# Patient Record
Sex: MALE | Race: WHITE
[De-identification: names, ages, dates, MRNs, and addresses within clinical notes are randomized per-mention and may not be internally consistent; named-entity substitution may affect disease eponyms.]

---

## 2017-01-03 ENCOUNTER — APPOINTMENT (OUTPATIENT)
Dept: CARDIOLOGY | Facility: CLINIC | Age: 82
End: 2017-01-03

## 2017-01-03 ENCOUNTER — NON-APPOINTMENT (OUTPATIENT)
Age: 82
End: 2017-01-03

## 2017-01-03 VITALS
BODY MASS INDEX: 28.93 KG/M2 | SYSTOLIC BLOOD PRESSURE: 175 MMHG | WEIGHT: 180 LBS | HEART RATE: 76 BPM | DIASTOLIC BLOOD PRESSURE: 98 MMHG | HEIGHT: 66 IN

## 2017-01-03 VITALS — DIASTOLIC BLOOD PRESSURE: 96 MMHG | SYSTOLIC BLOOD PRESSURE: 150 MMHG

## 2017-01-04 LAB
24R-OH-CALCIDIOL SERPL-MCNC: 43 PG/ML
ALBUMIN SERPL ELPH-MCNC: 4.5 G/DL
ALP BLD-CCNC: 90 U/L
ALT SERPL-CCNC: 10 U/L
ANION GAP SERPL CALC-SCNC: 15 MMOL/L
AST SERPL-CCNC: 28 U/L
BASOPHILS # BLD AUTO: 0.04 K/UL
BASOPHILS NFR BLD AUTO: 0.4 %
BILIRUB SERPL-MCNC: 1.5 MG/DL
BUN SERPL-MCNC: 20 MG/DL
CALCIUM SERPL-MCNC: 9.9 MG/DL
CHLORIDE SERPL-SCNC: 97 MMOL/L
CHOLEST SERPL-MCNC: 171 MG/DL
CHOLEST/HDLC SERPL: 2.5 RATIO
CO2 SERPL-SCNC: 24 MMOL/L
CREAT SERPL-MCNC: 1.02 MG/DL
EOSINOPHIL # BLD AUTO: 0.2 K/UL
EOSINOPHIL NFR BLD AUTO: 2.1 %
GLUCOSE SERPL-MCNC: 101 MG/DL
HBA1C MFR BLD HPLC: 5.7 %
HCT VFR BLD CALC: 45.4 %
HDLC SERPL-MCNC: 68 MG/DL
HGB BLD-MCNC: 15 G/DL
IMM GRANULOCYTES NFR BLD AUTO: 0.2 %
LDLC SERPL CALC-MCNC: 84 MG/DL
LYMPHOCYTES # BLD AUTO: 1.19 K/UL
LYMPHOCYTES NFR BLD AUTO: 12.7 %
MAN DIFF?: NORMAL
MCHC RBC-ENTMCNC: 29.1 PG
MCHC RBC-ENTMCNC: 33 GM/DL
MCV RBC AUTO: 88 FL
MONOCYTES # BLD AUTO: 0.87 K/UL
MONOCYTES NFR BLD AUTO: 9.3 %
NEUTROPHILS # BLD AUTO: 7.05 K/UL
NEUTROPHILS NFR BLD AUTO: 75.3 %
PLATELET # BLD AUTO: 218 K/UL
POTASSIUM SERPL-SCNC: 5.1 MMOL/L
PROT SERPL-MCNC: 8.1 G/DL
RBC # BLD: 5.16 M/UL
RBC # FLD: 13.2 %
SODIUM SERPL-SCNC: 136 MMOL/L
TRIGL SERPL-MCNC: 93 MG/DL
TSH SERPL-ACNC: 3.09 UIU/ML
WBC # FLD AUTO: 9.37 K/UL

## 2017-01-10 ENCOUNTER — APPOINTMENT (OUTPATIENT)
Dept: CARDIOLOGY | Facility: CLINIC | Age: 82
End: 2017-01-10

## 2017-03-20 ENCOUNTER — RX RENEWAL (OUTPATIENT)
Age: 82
End: 2017-03-20

## 2017-04-04 ENCOUNTER — NON-APPOINTMENT (OUTPATIENT)
Age: 82
End: 2017-04-04

## 2017-04-04 ENCOUNTER — APPOINTMENT (OUTPATIENT)
Dept: CARDIOLOGY | Facility: CLINIC | Age: 82
End: 2017-04-04

## 2017-04-04 VITALS
OXYGEN SATURATION: 100 % | HEART RATE: 68 BPM | BODY MASS INDEX: 28.73 KG/M2 | SYSTOLIC BLOOD PRESSURE: 151 MMHG | DIASTOLIC BLOOD PRESSURE: 83 MMHG | WEIGHT: 178 LBS

## 2017-04-04 VITALS — SYSTOLIC BLOOD PRESSURE: 134 MMHG | DIASTOLIC BLOOD PRESSURE: 74 MMHG

## 2017-04-26 ENCOUNTER — MEDICATION RENEWAL (OUTPATIENT)
Age: 82
End: 2017-04-26

## 2017-07-11 ENCOUNTER — APPOINTMENT (OUTPATIENT)
Dept: CARDIOLOGY | Facility: CLINIC | Age: 82
End: 2017-07-11

## 2017-07-11 ENCOUNTER — NON-APPOINTMENT (OUTPATIENT)
Age: 82
End: 2017-07-11

## 2017-07-11 VITALS
WEIGHT: 179.23 LBS | BODY MASS INDEX: 28.93 KG/M2 | DIASTOLIC BLOOD PRESSURE: 86 MMHG | HEART RATE: 72 BPM | OXYGEN SATURATION: 100 % | SYSTOLIC BLOOD PRESSURE: 163 MMHG

## 2017-07-11 VITALS — SYSTOLIC BLOOD PRESSURE: 138 MMHG | DIASTOLIC BLOOD PRESSURE: 80 MMHG

## 2017-08-13 ENCOUNTER — FORM ENCOUNTER (OUTPATIENT)
Age: 82
End: 2017-08-13

## 2017-08-14 ENCOUNTER — APPOINTMENT (OUTPATIENT)
Dept: MRI IMAGING | Facility: IMAGING CENTER | Age: 82
End: 2017-08-14
Payer: MEDICARE

## 2017-08-14 ENCOUNTER — OUTPATIENT (OUTPATIENT)
Dept: OUTPATIENT SERVICES | Facility: HOSPITAL | Age: 82
LOS: 1 days | End: 2017-08-14
Payer: MEDICARE

## 2017-08-14 DIAGNOSIS — C64.9 MALIGNANT NEOPLASM OF UNSPECIFIED KIDNEY, EXCEPT RENAL PELVIS: ICD-10-CM

## 2017-08-14 PROCEDURE — 82565 ASSAY OF CREATININE: CPT

## 2017-08-14 PROCEDURE — 74183 MRI ABD W/O CNTR FLWD CNTR: CPT

## 2017-08-14 PROCEDURE — 74183 MRI ABD W/O CNTR FLWD CNTR: CPT | Mod: 26

## 2017-08-14 PROCEDURE — A9585: CPT

## 2017-08-17 ENCOUNTER — APPOINTMENT (OUTPATIENT)
Dept: UROLOGY | Facility: CLINIC | Age: 82
End: 2017-08-17
Payer: MEDICARE

## 2017-08-17 PROCEDURE — 99214 OFFICE O/P EST MOD 30 MIN: CPT

## 2017-08-31 ENCOUNTER — RX RENEWAL (OUTPATIENT)
Age: 82
End: 2017-08-31

## 2017-09-01 ENCOUNTER — RX RENEWAL (OUTPATIENT)
Age: 82
End: 2017-09-01

## 2017-09-30 ENCOUNTER — MEDICATION RENEWAL (OUTPATIENT)
Age: 82
End: 2017-09-30

## 2017-10-12 ENCOUNTER — APPOINTMENT (OUTPATIENT)
Dept: CARDIOLOGY | Facility: CLINIC | Age: 82
End: 2017-10-12
Payer: MEDICARE

## 2017-10-12 VITALS
HEIGHT: 66 IN | OXYGEN SATURATION: 98 % | HEART RATE: 69 BPM | WEIGHT: 180 LBS | BODY MASS INDEX: 28.93 KG/M2 | DIASTOLIC BLOOD PRESSURE: 95 MMHG | SYSTOLIC BLOOD PRESSURE: 176 MMHG

## 2017-10-12 VITALS — SYSTOLIC BLOOD PRESSURE: 132 MMHG | DIASTOLIC BLOOD PRESSURE: 80 MMHG

## 2017-10-12 PROCEDURE — 99215 OFFICE O/P EST HI 40 MIN: CPT

## 2017-12-04 ENCOUNTER — RX RENEWAL (OUTPATIENT)
Age: 82
End: 2017-12-04

## 2018-01-16 ENCOUNTER — NON-APPOINTMENT (OUTPATIENT)
Age: 83
End: 2018-01-16

## 2018-01-16 ENCOUNTER — APPOINTMENT (OUTPATIENT)
Dept: CARDIOLOGY | Facility: CLINIC | Age: 83
End: 2018-01-16
Payer: MEDICARE

## 2018-01-16 VITALS
HEIGHT: 66 IN | BODY MASS INDEX: 27.97 KG/M2 | DIASTOLIC BLOOD PRESSURE: 95 MMHG | SYSTOLIC BLOOD PRESSURE: 158 MMHG | HEART RATE: 73 BPM | OXYGEN SATURATION: 98 % | WEIGHT: 174 LBS

## 2018-01-16 VITALS — SYSTOLIC BLOOD PRESSURE: 142 MMHG | DIASTOLIC BLOOD PRESSURE: 82 MMHG

## 2018-01-16 DIAGNOSIS — E03.9 HYPOTHYROIDISM, UNSPECIFIED: ICD-10-CM

## 2018-01-16 LAB
ALBUMIN SERPL ELPH-MCNC: 4.3 G/DL
ALP BLD-CCNC: 93 U/L
ALT SERPL-CCNC: 12 U/L
ANION GAP SERPL CALC-SCNC: 15 MMOL/L
AST SERPL-CCNC: 21 U/L
BASOPHILS # BLD AUTO: 0.04 K/UL
BASOPHILS NFR BLD AUTO: 0.6 %
BILIRUB SERPL-MCNC: 1.7 MG/DL
BUN SERPL-MCNC: 25 MG/DL
CALCIUM SERPL-MCNC: 9.9 MG/DL
CHLORIDE SERPL-SCNC: 100 MMOL/L
CHOLEST SERPL-MCNC: 158 MG/DL
CHOLEST/HDLC SERPL: 2.4 RATIO
CO2 SERPL-SCNC: 24 MMOL/L
CREAT SERPL-MCNC: 1.31 MG/DL
EOSINOPHIL # BLD AUTO: 0.14 K/UL
EOSINOPHIL NFR BLD AUTO: 2.1 %
GLUCOSE SERPL-MCNC: 104 MG/DL
HCT VFR BLD CALC: 45 %
HDLC SERPL-MCNC: 67 MG/DL
HGB BLD-MCNC: 14.8 G/DL
IMM GRANULOCYTES NFR BLD AUTO: 0.1 %
LDLC SERPL CALC-MCNC: 72 MG/DL
LYMPHOCYTES # BLD AUTO: 1.3 K/UL
LYMPHOCYTES NFR BLD AUTO: 19.2 %
MAN DIFF?: NORMAL
MCHC RBC-ENTMCNC: 28.6 PG
MCHC RBC-ENTMCNC: 32.9 GM/DL
MCV RBC AUTO: 86.9 FL
MONOCYTES # BLD AUTO: 0.85 K/UL
MONOCYTES NFR BLD AUTO: 12.6 %
NEUTROPHILS # BLD AUTO: 4.43 K/UL
NEUTROPHILS NFR BLD AUTO: 65.4 %
PLATELET # BLD AUTO: 193 K/UL
POTASSIUM SERPL-SCNC: 4.9 MMOL/L
PROT SERPL-MCNC: 7.9 G/DL
RBC # BLD: 5.18 M/UL
RBC # FLD: 13.3 %
SODIUM SERPL-SCNC: 139 MMOL/L
TRIGL SERPL-MCNC: 95 MG/DL
TSH SERPL-ACNC: 1.51 UIU/ML
VIT B12 SERPL-MCNC: 441 PG/ML
WBC # FLD AUTO: 6.77 K/UL

## 2018-01-16 PROCEDURE — 93000 ELECTROCARDIOGRAM COMPLETE: CPT

## 2018-01-16 PROCEDURE — 99215 OFFICE O/P EST HI 40 MIN: CPT

## 2018-01-17 DIAGNOSIS — E55.9 VITAMIN D DEFICIENCY, UNSPECIFIED: ICD-10-CM

## 2018-01-17 LAB
25(OH)D3 SERPL-MCNC: 7.4 NG/ML
HBA1C MFR BLD HPLC: 5.6 %

## 2018-02-12 ENCOUNTER — FORM ENCOUNTER (OUTPATIENT)
Age: 83
End: 2018-02-12

## 2018-02-13 ENCOUNTER — APPOINTMENT (OUTPATIENT)
Dept: MRI IMAGING | Facility: IMAGING CENTER | Age: 83
End: 2018-02-13
Payer: MEDICARE

## 2018-02-13 ENCOUNTER — OUTPATIENT (OUTPATIENT)
Dept: OUTPATIENT SERVICES | Facility: HOSPITAL | Age: 83
LOS: 1 days | End: 2018-02-13
Payer: MEDICARE

## 2018-02-13 DIAGNOSIS — C64.9 MALIGNANT NEOPLASM OF UNSPECIFIED KIDNEY, EXCEPT RENAL PELVIS: ICD-10-CM

## 2018-02-13 PROCEDURE — A9585: CPT

## 2018-02-13 PROCEDURE — 74183 MRI ABD W/O CNTR FLWD CNTR: CPT | Mod: 26

## 2018-02-13 PROCEDURE — 74183 MRI ABD W/O CNTR FLWD CNTR: CPT

## 2018-02-22 ENCOUNTER — APPOINTMENT (OUTPATIENT)
Dept: UROLOGY | Facility: CLINIC | Age: 83
End: 2018-02-22
Payer: MEDICARE

## 2018-02-22 PROCEDURE — 99214 OFFICE O/P EST MOD 30 MIN: CPT

## 2018-04-17 ENCOUNTER — APPOINTMENT (OUTPATIENT)
Dept: CARDIOLOGY | Facility: CLINIC | Age: 83
End: 2018-04-17
Payer: MEDICARE

## 2018-04-17 ENCOUNTER — NON-APPOINTMENT (OUTPATIENT)
Age: 83
End: 2018-04-17

## 2018-04-17 VITALS — DIASTOLIC BLOOD PRESSURE: 90 MMHG | SYSTOLIC BLOOD PRESSURE: 150 MMHG

## 2018-04-17 VITALS
BODY MASS INDEX: 28.25 KG/M2 | SYSTOLIC BLOOD PRESSURE: 175 MMHG | DIASTOLIC BLOOD PRESSURE: 89 MMHG | OXYGEN SATURATION: 100 % | WEIGHT: 175 LBS | HEART RATE: 82 BPM

## 2018-04-17 PROCEDURE — 99215 OFFICE O/P EST HI 40 MIN: CPT

## 2018-04-18 ENCOUNTER — RX RENEWAL (OUTPATIENT)
Age: 83
End: 2018-04-18

## 2018-06-04 ENCOUNTER — RX RENEWAL (OUTPATIENT)
Age: 83
End: 2018-06-04

## 2018-07-17 ENCOUNTER — NON-APPOINTMENT (OUTPATIENT)
Age: 83
End: 2018-07-17

## 2018-07-17 ENCOUNTER — APPOINTMENT (OUTPATIENT)
Dept: CARDIOLOGY | Facility: CLINIC | Age: 83
End: 2018-07-17
Payer: MEDICARE

## 2018-07-17 VITALS — BODY MASS INDEX: 28.25 KG/M2 | SYSTOLIC BLOOD PRESSURE: 167 MMHG | WEIGHT: 175 LBS | DIASTOLIC BLOOD PRESSURE: 101 MMHG

## 2018-07-17 VITALS — SYSTOLIC BLOOD PRESSURE: 142 MMHG | DIASTOLIC BLOOD PRESSURE: 86 MMHG

## 2018-07-17 PROCEDURE — 99215 OFFICE O/P EST HI 40 MIN: CPT

## 2018-08-15 ENCOUNTER — FORM ENCOUNTER (OUTPATIENT)
Age: 83
End: 2018-08-15

## 2018-08-16 ENCOUNTER — APPOINTMENT (OUTPATIENT)
Dept: MRI IMAGING | Facility: IMAGING CENTER | Age: 83
End: 2018-08-16
Payer: MEDICARE

## 2018-08-16 ENCOUNTER — OUTPATIENT (OUTPATIENT)
Dept: OUTPATIENT SERVICES | Facility: HOSPITAL | Age: 83
LOS: 1 days | End: 2018-08-16
Payer: MEDICARE

## 2018-08-16 DIAGNOSIS — C64.9 MALIGNANT NEOPLASM OF UNSPECIFIED KIDNEY, EXCEPT RENAL PELVIS: ICD-10-CM

## 2018-08-16 PROCEDURE — 74183 MRI ABD W/O CNTR FLWD CNTR: CPT | Mod: 26

## 2018-08-16 PROCEDURE — 74183 MRI ABD W/O CNTR FLWD CNTR: CPT

## 2018-08-16 PROCEDURE — A9585: CPT

## 2018-08-16 PROCEDURE — 82565 ASSAY OF CREATININE: CPT

## 2018-08-30 ENCOUNTER — RX RENEWAL (OUTPATIENT)
Age: 83
End: 2018-08-30

## 2018-09-05 ENCOUNTER — APPOINTMENT (OUTPATIENT)
Dept: UROLOGY | Facility: CLINIC | Age: 83
End: 2018-09-05
Payer: MEDICARE

## 2018-09-05 PROCEDURE — 99213 OFFICE O/P EST LOW 20 MIN: CPT

## 2018-11-20 ENCOUNTER — APPOINTMENT (OUTPATIENT)
Dept: CARDIOLOGY | Facility: CLINIC | Age: 83
End: 2018-11-20
Payer: MEDICARE

## 2018-11-20 ENCOUNTER — NON-APPOINTMENT (OUTPATIENT)
Age: 83
End: 2018-11-20

## 2018-11-20 VITALS — DIASTOLIC BLOOD PRESSURE: 84 MMHG | SYSTOLIC BLOOD PRESSURE: 154 MMHG

## 2018-11-20 VITALS
WEIGHT: 175 LBS | BODY MASS INDEX: 28.25 KG/M2 | SYSTOLIC BLOOD PRESSURE: 174 MMHG | DIASTOLIC BLOOD PRESSURE: 94 MMHG | HEART RATE: 89 BPM

## 2018-11-20 PROCEDURE — 99215 OFFICE O/P EST HI 40 MIN: CPT

## 2018-11-20 NOTE — PHYSICAL EXAM
[General Appearance - Well Developed] : well developed [Normal Appearance] : normal appearance [Well Groomed] : well groomed [General Appearance - Well Nourished] : well nourished [No Deformities] : no deformities [General Appearance - In No Acute Distress] : no acute distress [Normal Conjunctiva] : the conjunctiva exhibited no abnormalities [Eyelids - No Xanthelasma] : the eyelids demonstrated no xanthelasmas [Normal Oral Mucosa] : normal oral mucosa [No Oral Pallor] : no oral pallor [No Oral Cyanosis] : no oral cyanosis [Normal Jugular Venous A Waves Present] : normal jugular venous A waves present [Normal Jugular Venous V Waves Present] : normal jugular venous V waves present [No Jugular Venous Farris A Waves] : no jugular venous farris A waves [Respiration, Rhythm And Depth] : normal respiratory rhythm and effort [Exaggerated Use Of Accessory Muscles For Inspiration] : no accessory muscle use [Auscultation Breath Sounds / Voice Sounds] : lungs were clear to auscultation bilaterally [Heart Sounds] : normal S1 and S2 [Irregularly Irregular] : the rhythm was irregularly irregular [Systolic grade ___/6] : A grade [unfilled]/6 systolic murmur was heard. [Abdomen Soft] : soft [Abdomen Tenderness] : non-tender [Abdomen Mass (___ Cm)] : no abdominal mass palpated [Abnormal Walk] : normal gait [Gait - Sufficient For Exercise Testing] : the gait was sufficient for exercise testing [Nail Clubbing] : no clubbing of the fingernails [Cyanosis, Localized] : no localized cyanosis [Petechial Hemorrhages (___cm)] : no petechial hemorrhages [Skin Color & Pigmentation] : normal skin color and pigmentation [] : no rash [No Venous Stasis] : no venous stasis [Skin Lesions] : no skin lesions [No Skin Ulcers] : no skin ulcer [No Xanthoma] : no  xanthoma was observed [Oriented To Time, Place, And Person] : oriented to person, place, and time [Affect] : the affect was normal [Mood] : the mood was normal [No Anxiety] : not feeling anxious

## 2018-11-20 NOTE — DISCUSSION/SUMMARY
[FreeTextEntry1] : The patient is an 83-year-old gentleman history of benign prostatic hypertrophy, repeated urinary tract infections, hypothyroidism,coronary artery disease, hypertension, hyperlipidemia, chronic atrial fibrillation, s/p removal of biliary stent whose blood pressure is acceptable for his age. Well rate controlled. No bleeding on xarelto. Ortho f/u for knee. Continue walking for exercise with cane. Labs acceptable.

## 2018-11-20 NOTE — HISTORY OF PRESENT ILLNESS
[FreeTextEntry1] : Nathanael has been doing relatively well  except for knee pain.  Denies any chest pain, palpitations, lightheadedness or dizziness.

## 2019-02-25 ENCOUNTER — RX RENEWAL (OUTPATIENT)
Age: 84
End: 2019-02-25

## 2019-03-19 ENCOUNTER — RX RENEWAL (OUTPATIENT)
Age: 84
End: 2019-03-19

## 2019-03-26 ENCOUNTER — APPOINTMENT (OUTPATIENT)
Dept: CARDIOLOGY | Facility: CLINIC | Age: 84
End: 2019-03-26
Payer: MEDICARE

## 2019-03-26 VITALS
SYSTOLIC BLOOD PRESSURE: 165 MMHG | OXYGEN SATURATION: 99 % | BODY MASS INDEX: 28.12 KG/M2 | DIASTOLIC BLOOD PRESSURE: 78 MMHG | HEART RATE: 70 BPM | HEIGHT: 66 IN | WEIGHT: 175 LBS

## 2019-03-26 VITALS — SYSTOLIC BLOOD PRESSURE: 130 MMHG | DIASTOLIC BLOOD PRESSURE: 84 MMHG

## 2019-03-26 LAB
BASOPHILS # BLD AUTO: 0.05 K/UL
BASOPHILS NFR BLD AUTO: 0.7 %
EOSINOPHIL # BLD AUTO: 0.08 K/UL
EOSINOPHIL NFR BLD AUTO: 1.2 %
HCT VFR BLD CALC: 44 %
HGB BLD-MCNC: 14.3 G/DL
IMM GRANULOCYTES NFR BLD AUTO: 0.3 %
LYMPHOCYTES # BLD AUTO: 1.35 K/UL
LYMPHOCYTES NFR BLD AUTO: 20.1 %
MAN DIFF?: NORMAL
MCHC RBC-ENTMCNC: 28.2 PG
MCHC RBC-ENTMCNC: 32.5 GM/DL
MCV RBC AUTO: 86.8 FL
MONOCYTES # BLD AUTO: 0.95 K/UL
MONOCYTES NFR BLD AUTO: 14.2 %
NEUTROPHILS # BLD AUTO: 4.25 K/UL
NEUTROPHILS NFR BLD AUTO: 63.5 %
PLATELET # BLD AUTO: 189 K/UL
RBC # BLD: 5.07 M/UL
RBC # FLD: 12.5 %
WBC # FLD AUTO: 6.7 K/UL

## 2019-03-26 PROCEDURE — 99215 OFFICE O/P EST HI 40 MIN: CPT

## 2019-03-26 PROCEDURE — 93000 ELECTROCARDIOGRAM COMPLETE: CPT

## 2019-03-26 NOTE — HISTORY OF PRESENT ILLNESS
[FreeTextEntry1] : Nathanael has been doing relatively well  except for occasional bloody noses.  Denies any chest pain, palpitations, lightheadedness or dizziness.

## 2019-03-26 NOTE — DISCUSSION/SUMMARY
[___ Month(s)] : [unfilled] month(s) [FreeTextEntry1] : The patient is an 83-year-old gentleman history of benign prostatic hypertrophy, repeated urinary tract infections, hypothyroidism,coronary artery disease, hypertension, hyperlipidemia, chronic atrial fibrillation, s/p removal of biliary stent with occasional nose bleed.\par #1 Afib- rate controlled on atenolol, no significant bleeding on xarelto, recommend saline drops for nose and humidifier in winter\par #2 Htn- on losartan/HCTZ\par #3 Lipids- on pravastatin\par #4 CV- no angina or HF on aspirin\par #5 Hypothyroid- on levothyroxine\par #6 - BPH and prostate ca, on finasteride, f/u urology

## 2019-03-27 LAB
25(OH)D3 SERPL-MCNC: 15.9 NG/ML
ALBUMIN SERPL ELPH-MCNC: 4.7 G/DL
ALP BLD-CCNC: 84 U/L
ALT SERPL-CCNC: 12 U/L
ANION GAP SERPL CALC-SCNC: 16 MMOL/L
AST SERPL-CCNC: 20 U/L
BILIRUB SERPL-MCNC: 1.7 MG/DL
BUN SERPL-MCNC: 20 MG/DL
CALCIUM SERPL-MCNC: 9.9 MG/DL
CHLORIDE SERPL-SCNC: 100 MMOL/L
CHOLEST SERPL-MCNC: 146 MG/DL
CHOLEST/HDLC SERPL: 2.5 RATIO
CO2 SERPL-SCNC: 24 MMOL/L
CREAT SERPL-MCNC: 1.24 MG/DL
ESTIMATED AVERAGE GLUCOSE: 114 MG/DL
GLUCOSE SERPL-MCNC: 119 MG/DL
HBA1C MFR BLD HPLC: 5.6 %
HDLC SERPL-MCNC: 58 MG/DL
LDLC SERPL CALC-MCNC: 70 MG/DL
POTASSIUM SERPL-SCNC: 4.5 MMOL/L
PROT SERPL-MCNC: 7.4 G/DL
SODIUM SERPL-SCNC: 140 MMOL/L
TRIGL SERPL-MCNC: 90 MG/DL
TSH SERPL-ACNC: 0.41 UIU/ML
VIT B12 SERPL-MCNC: 462 PG/ML

## 2019-03-28 RX ORDER — CHOLECALCIFEROL (VITAMIN D3) 1250 MCG
1.25 MG CAPSULE ORAL
Qty: 12 | Refills: 3 | Status: ACTIVE | COMMUNITY
Start: 2018-01-17 | End: 1900-01-01

## 2019-04-10 ENCOUNTER — MEDICATION RENEWAL (OUTPATIENT)
Age: 84
End: 2019-04-10

## 2019-07-30 ENCOUNTER — NON-APPOINTMENT (OUTPATIENT)
Age: 84
End: 2019-07-30

## 2019-07-30 ENCOUNTER — APPOINTMENT (OUTPATIENT)
Dept: CARDIOLOGY | Facility: CLINIC | Age: 84
End: 2019-07-30
Payer: MEDICARE

## 2019-07-30 VITALS — DIASTOLIC BLOOD PRESSURE: 80 MMHG | SYSTOLIC BLOOD PRESSURE: 160 MMHG

## 2019-07-30 VITALS
WEIGHT: 178 LBS | BODY MASS INDEX: 28.73 KG/M2 | HEART RATE: 62 BPM | SYSTOLIC BLOOD PRESSURE: 189 MMHG | DIASTOLIC BLOOD PRESSURE: 84 MMHG | OXYGEN SATURATION: 99 %

## 2019-07-30 PROCEDURE — 99215 OFFICE O/P EST HI 40 MIN: CPT

## 2019-07-30 PROCEDURE — 93000 ELECTROCARDIOGRAM COMPLETE: CPT

## 2019-07-30 NOTE — HISTORY OF PRESENT ILLNESS
[FreeTextEntry1] : Nathanael has been doing relatively well  but frustrated today with traffic getting here.  Denies any chest pain, palpitations, lightheadedness or dizziness.

## 2019-07-30 NOTE — REVIEW OF SYSTEMS
[Negative] : Heme/Lymph [Shortness Of Breath] : no shortness of breath [Dyspnea on exertion] : not dyspnea during exertion [Chest Pain] : no chest pain [Palpitations] : no palpitations [Lower Ext Edema] : no extremity edema

## 2019-07-30 NOTE — DISCUSSION/SUMMARY
[___ Month(s)] : [unfilled] month(s) [FreeTextEntry1] : The patient is an 84-year-old gentleman history of benign prostatic hypertrophy, repeated urinary tract infections, hypothyroidism,coronary artery disease, hypertension, hyperlipidemia, chronic atrial fibrillation, s/p removal of biliary stent who is doing well.\par #1 Afib- rate controlled on atenolol, no significant bleeding on xarelto, recommend saline drops for nose and humidifier in winter\par #2 Htn- on losartan/HCTZ, slightly elevated today but frustrated with traffic and long trip here\par #3 Lipids- on pravastatin\par #4 CV- no angina or HF on aspirin\par #5 Hypothyroid- on levothyroxine\par #6 - BPH and prostate ca, on finasteride, f/u urology

## 2019-07-30 NOTE — PHYSICAL EXAM
[Normal Appearance] : normal appearance [General Appearance - Well Developed] : well developed [Well Groomed] : well groomed [General Appearance - Well Nourished] : well nourished [No Deformities] : no deformities [Normal Conjunctiva] : the conjunctiva exhibited no abnormalities [General Appearance - In No Acute Distress] : no acute distress [Normal Oral Mucosa] : normal oral mucosa [Eyelids - No Xanthelasma] : the eyelids demonstrated no xanthelasmas [No Oral Pallor] : no oral pallor [No Oral Cyanosis] : no oral cyanosis [Normal Jugular Venous V Waves Present] : normal jugular venous V waves present [No Jugular Venous Farris A Waves] : no jugular venous farris A waves [Normal Jugular Venous A Waves Present] : normal jugular venous A waves present [Respiration, Rhythm And Depth] : normal respiratory rhythm and effort [Auscultation Breath Sounds / Voice Sounds] : lungs were clear to auscultation bilaterally [Exaggerated Use Of Accessory Muscles For Inspiration] : no accessory muscle use [Heart Sounds] : normal S1 and S2 [Systolic grade ___/6] : A grade [unfilled]/6 systolic murmur was heard. [Irregularly Irregular] : the rhythm was irregularly irregular [Abdomen Tenderness] : non-tender [Abdomen Soft] : soft [Abdomen Mass (___ Cm)] : no abdominal mass palpated [Abnormal Walk] : normal gait [Nail Clubbing] : no clubbing of the fingernails [Gait - Sufficient For Exercise Testing] : the gait was sufficient for exercise testing [Cyanosis, Localized] : no localized cyanosis [Skin Color & Pigmentation] : normal skin color and pigmentation [Petechial Hemorrhages (___cm)] : no petechial hemorrhages [] : no rash [Skin Lesions] : no skin lesions [No Venous Stasis] : no venous stasis [No Skin Ulcers] : no skin ulcer [No Xanthoma] : no  xanthoma was observed [Oriented To Time, Place, And Person] : oriented to person, place, and time [Affect] : the affect was normal [Mood] : the mood was normal [No Anxiety] : not feeling anxious

## 2019-08-13 ENCOUNTER — RX RENEWAL (OUTPATIENT)
Age: 84
End: 2019-08-13

## 2019-09-02 ENCOUNTER — RX RENEWAL (OUTPATIENT)
Age: 84
End: 2019-09-02

## 2019-09-10 ENCOUNTER — APPOINTMENT (OUTPATIENT)
Dept: UROLOGY | Facility: CLINIC | Age: 84
End: 2019-09-10

## 2019-10-31 ENCOUNTER — NON-APPOINTMENT (OUTPATIENT)
Age: 84
End: 2019-10-31

## 2019-10-31 ENCOUNTER — APPOINTMENT (OUTPATIENT)
Dept: CARDIOLOGY | Facility: CLINIC | Age: 84
End: 2019-10-31
Payer: MEDICARE

## 2019-10-31 VITALS
DIASTOLIC BLOOD PRESSURE: 86 MMHG | WEIGHT: 176 LBS | OXYGEN SATURATION: 99 % | BODY MASS INDEX: 28.41 KG/M2 | SYSTOLIC BLOOD PRESSURE: 188 MMHG | HEART RATE: 70 BPM

## 2019-10-31 VITALS — SYSTOLIC BLOOD PRESSURE: 168 MMHG | DIASTOLIC BLOOD PRESSURE: 86 MMHG

## 2019-10-31 PROCEDURE — 99214 OFFICE O/P EST MOD 30 MIN: CPT

## 2019-10-31 PROCEDURE — 93000 ELECTROCARDIOGRAM COMPLETE: CPT

## 2019-10-31 NOTE — DISCUSSION/SUMMARY
[___ Month(s)] : [unfilled] month(s) [FreeTextEntry1] : The patient is an 84-year-old gentleman history of benign prostatic hypertrophy, repeated urinary tract infections, hypothyroidism,coronary artery disease, hypertension, hyperlipidemia, chronic atrial fibrillation, s/p removal of biliary stent with mild elevation bp.\par #1 Afib- rate controlled on atenolol, no significant bleeding on xarelto, recommend saline drops for nose and humidifier in winter\par #2 Htn- change to valsartan 320/25mg\par #3 Lipids- on pravastatin\par #4 CV- no angina or HF on aspirin\par #5 Hypothyroid- on levothyroxine\par #6 - BPH and prostate ca, on finasteride, f/u urology

## 2019-10-31 NOTE — HISTORY OF PRESENT ILLNESS
[FreeTextEntry1] : Nathanael has been doing relatively well . BP ok at home in the 130s usually.  Denies any chest pain, palpitations, lightheadedness or dizziness.

## 2020-02-20 ENCOUNTER — RX RENEWAL (OUTPATIENT)
Age: 85
End: 2020-02-20

## 2020-03-03 ENCOUNTER — NON-APPOINTMENT (OUTPATIENT)
Age: 85
End: 2020-03-03

## 2020-03-03 ENCOUNTER — APPOINTMENT (OUTPATIENT)
Dept: CARDIOLOGY | Facility: CLINIC | Age: 85
End: 2020-03-03
Payer: MEDICARE

## 2020-03-03 VITALS — DIASTOLIC BLOOD PRESSURE: 72 MMHG | SYSTOLIC BLOOD PRESSURE: 142 MMHG

## 2020-03-03 VITALS
OXYGEN SATURATION: 99 % | DIASTOLIC BLOOD PRESSURE: 71 MMHG | HEART RATE: 76 BPM | HEIGHT: 66 IN | SYSTOLIC BLOOD PRESSURE: 179 MMHG | BODY MASS INDEX: 27.8 KG/M2 | WEIGHT: 173 LBS

## 2020-03-03 LAB
BASOPHILS # BLD AUTO: 0.05 K/UL
BASOPHILS NFR BLD AUTO: 0.6 %
EOSINOPHIL # BLD AUTO: 0.14 K/UL
EOSINOPHIL NFR BLD AUTO: 1.7 %
HCT VFR BLD CALC: 43.1 %
HGB BLD-MCNC: 13.8 G/DL
IMM GRANULOCYTES NFR BLD AUTO: 0.2 %
LYMPHOCYTES # BLD AUTO: 1.49 K/UL
LYMPHOCYTES NFR BLD AUTO: 17.9 %
MAN DIFF?: NORMAL
MCHC RBC-ENTMCNC: 28.2 PG
MCHC RBC-ENTMCNC: 32 GM/DL
MCV RBC AUTO: 88.1 FL
MONOCYTES # BLD AUTO: 1.09 K/UL
MONOCYTES NFR BLD AUTO: 13.1 %
NEUTROPHILS # BLD AUTO: 5.54 K/UL
NEUTROPHILS NFR BLD AUTO: 66.5 %
PLATELET # BLD AUTO: 191 K/UL
RBC # BLD: 4.89 M/UL
RBC # FLD: 13.1 %
WBC # FLD AUTO: 8.33 K/UL

## 2020-03-03 PROCEDURE — 93000 ELECTROCARDIOGRAM COMPLETE: CPT

## 2020-03-03 PROCEDURE — 99214 OFFICE O/P EST MOD 30 MIN: CPT

## 2020-03-03 NOTE — HISTORY OF PRESENT ILLNESS
[FreeTextEntry1] : Nathanael has been doing relatively well . Always stressed driving here.  Denies any chest pain, palpitations, lightheadedness or dizziness.

## 2020-03-03 NOTE — PHYSICAL EXAM
[General Appearance - Well Developed] : well developed [Well Groomed] : well groomed [Normal Appearance] : normal appearance [General Appearance - Well Nourished] : well nourished [General Appearance - In No Acute Distress] : no acute distress [No Deformities] : no deformities [Eyelids - No Xanthelasma] : the eyelids demonstrated no xanthelasmas [Normal Conjunctiva] : the conjunctiva exhibited no abnormalities [Normal Oral Mucosa] : normal oral mucosa [No Oral Pallor] : no oral pallor [No Oral Cyanosis] : no oral cyanosis [Normal Jugular Venous A Waves Present] : normal jugular venous A waves present [Normal Jugular Venous V Waves Present] : normal jugular venous V waves present [No Jugular Venous Farris A Waves] : no jugular venous farris A waves [Respiration, Rhythm And Depth] : normal respiratory rhythm and effort [Exaggerated Use Of Accessory Muscles For Inspiration] : no accessory muscle use [Auscultation Breath Sounds / Voice Sounds] : lungs were clear to auscultation bilaterally [Heart Sounds] : normal S1 and S2 [Irregularly Irregular] : the rhythm was irregularly irregular [Systolic grade ___/6] : A grade [unfilled]/6 systolic murmur was heard. [Abdomen Soft] : soft [Abdomen Tenderness] : non-tender [Abdomen Mass (___ Cm)] : no abdominal mass palpated [Abnormal Walk] : normal gait [Gait - Sufficient For Exercise Testing] : the gait was sufficient for exercise testing [Nail Clubbing] : no clubbing of the fingernails [Cyanosis, Localized] : no localized cyanosis [Petechial Hemorrhages (___cm)] : no petechial hemorrhages [Skin Color & Pigmentation] : normal skin color and pigmentation [] : no rash [No Venous Stasis] : no venous stasis [Skin Lesions] : no skin lesions [No Skin Ulcers] : no skin ulcer [No Xanthoma] : no  xanthoma was observed [Oriented To Time, Place, And Person] : oriented to person, place, and time [Affect] : the affect was normal [Mood] : the mood was normal [No Anxiety] : not feeling anxious

## 2020-03-03 NOTE — DISCUSSION/SUMMARY
[___ Month(s)] : [unfilled] month(s) [FreeTextEntry1] : The patient is an 84-year-old gentleman history of benign prostatic hypertrophy, repeated urinary tract infections, hypothyroidism,coronary artery disease, hypertension, hyperlipidemia, chronic atrial fibrillation, s/p removal of biliary stent who is doing well. \par #1 Afib- rate controlled on atenolol, no significant bleeding on xarelto\par #2 Htn- better on valsartan 320/25mg\par #3 Lipids- on pravastatin\par #4 CV- no angina or HF on aspirin\par #5 Hypothyroid- on levothyroxine\par #6 - BPH and prostate ca, on finasteride, f/u urology, labs today

## 2020-03-04 LAB
25(OH)D3 SERPL-MCNC: 25.4 NG/ML
ALBUMIN SERPL ELPH-MCNC: 4.7 G/DL
ALP BLD-CCNC: 92 U/L
ALT SERPL-CCNC: 10 U/L
ANION GAP SERPL CALC-SCNC: 16 MMOL/L
AST SERPL-CCNC: 19 U/L
BILIRUB SERPL-MCNC: 1 MG/DL
BUN SERPL-MCNC: 30 MG/DL
CALCIUM SERPL-MCNC: 10 MG/DL
CHLORIDE SERPL-SCNC: 101 MMOL/L
CHOLEST SERPL-MCNC: 146 MG/DL
CHOLEST/HDLC SERPL: 2.3 RATIO
CO2 SERPL-SCNC: 24 MMOL/L
CREAT SERPL-MCNC: 1.32 MG/DL
ESTIMATED AVERAGE GLUCOSE: 120 MG/DL
GLUCOSE SERPL-MCNC: 111 MG/DL
HBA1C MFR BLD HPLC: 5.8 %
HDLC SERPL-MCNC: 63 MG/DL
LDLC SERPL CALC-MCNC: 66 MG/DL
POTASSIUM SERPL-SCNC: 5 MMOL/L
PROT SERPL-MCNC: 7.4 G/DL
SODIUM SERPL-SCNC: 140 MMOL/L
TRIGL SERPL-MCNC: 88 MG/DL
TSH SERPL-ACNC: 0.57 UIU/ML
VIT B12 SERPL-MCNC: 475 PG/ML

## 2020-05-19 ENCOUNTER — RX RENEWAL (OUTPATIENT)
Age: 85
End: 2020-05-19

## 2020-05-28 ENCOUNTER — RX RENEWAL (OUTPATIENT)
Age: 85
End: 2020-05-28

## 2020-07-14 ENCOUNTER — APPOINTMENT (OUTPATIENT)
Dept: CARDIOLOGY | Facility: CLINIC | Age: 85
End: 2020-07-14

## 2021-05-06 ENCOUNTER — APPOINTMENT (OUTPATIENT)
Dept: CARDIOLOGY | Facility: CLINIC | Age: 86
End: 2021-05-06

## 2021-05-25 ENCOUNTER — APPOINTMENT (OUTPATIENT)
Dept: CARDIOLOGY | Facility: CLINIC | Age: 86
End: 2021-05-25
Payer: MEDICARE

## 2021-05-25 ENCOUNTER — NON-APPOINTMENT (OUTPATIENT)
Age: 86
End: 2021-05-25

## 2021-05-25 VITALS — DIASTOLIC BLOOD PRESSURE: 70 MMHG | SYSTOLIC BLOOD PRESSURE: 146 MMHG

## 2021-05-25 VITALS
DIASTOLIC BLOOD PRESSURE: 69 MMHG | HEART RATE: 65 BPM | WEIGHT: 165 LBS | OXYGEN SATURATION: 100 % | SYSTOLIC BLOOD PRESSURE: 162 MMHG | BODY MASS INDEX: 26.52 KG/M2 | HEIGHT: 66 IN

## 2021-05-25 PROCEDURE — 99214 OFFICE O/P EST MOD 30 MIN: CPT

## 2021-05-25 PROCEDURE — 93000 ELECTROCARDIOGRAM COMPLETE: CPT

## 2021-05-25 NOTE — PHYSICAL EXAM

## 2021-05-25 NOTE — HISTORY OF PRESENT ILLNESS
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 He has been self quarantine. He has been having difficulty with walking secondary to severe knee pain and ambulates with cane. He is going back to revisit the idea of TKR. He tries to exercise even though he is in pain. He does not want to stop.

## 2021-05-25 NOTE — DISCUSSION/SUMMARY
[___ Month(s)] : in [unfilled] month(s) [FreeTextEntry1] : The patient is an 85-year-old gentleman history of benign prostatic hypertrophy, repeated urinary tract infections, hypothyroidism,coronary artery disease, hypertension, hyperlipidemia, chronic atrial fibrillation, s/p removal of biliary stent who is doing well. \par #1 Afib- rate controlled on atenolol, no significant bleeding on xarelto\par #2 Htn- better on valsartan 320/25mg\par #3 Lipids- on pravastatin\par #4 CV- no angina or HF on aspirin\par #5 Hypothyroid- on levothyroxine\par #6 - BPH and prostate ca, on finasteride, f/u urology, labs today

## 2021-05-31 ENCOUNTER — RX RENEWAL (OUTPATIENT)
Age: 86
End: 2021-05-31

## 2021-07-06 ENCOUNTER — APPOINTMENT (OUTPATIENT)
Dept: UROLOGY | Facility: CLINIC | Age: 86
End: 2021-07-06
Payer: MEDICARE

## 2021-07-06 VITALS
WEIGHT: 165 LBS | TEMPERATURE: 98 F | DIASTOLIC BLOOD PRESSURE: 79 MMHG | SYSTOLIC BLOOD PRESSURE: 180 MMHG | HEART RATE: 78 BPM | OXYGEN SATURATION: 99 % | BODY MASS INDEX: 25.9 KG/M2 | HEIGHT: 67 IN

## 2021-07-06 DIAGNOSIS — C64.9 MALIGNANT NEOPLASM OF UNSPECIFIED KIDNEY, EXCEPT RENAL PELVIS: ICD-10-CM

## 2021-07-06 PROCEDURE — 99213 OFFICE O/P EST LOW 20 MIN: CPT

## 2021-07-06 NOTE — HISTORY OF PRESENT ILLNESS
[FreeTextEntry1] : 85 yo M, previously followed by Dr Perez for small renal mass, on AS. Last imaging 8/2018 showed 2.8 cm solid enhancing mass in LEFT kidney, minimally increased in size from previous imaging. No  or voiding complaints. He follows with Dr Mike for urethral strictures and other routine urologic care.

## 2021-08-05 ENCOUNTER — APPOINTMENT (OUTPATIENT)
Dept: MRI IMAGING | Facility: CLINIC | Age: 86
End: 2021-08-05
Payer: MEDICARE

## 2021-08-05 ENCOUNTER — OUTPATIENT (OUTPATIENT)
Dept: OUTPATIENT SERVICES | Facility: HOSPITAL | Age: 86
LOS: 1 days | End: 2021-08-05
Payer: MEDICARE

## 2021-08-05 DIAGNOSIS — C64.9 MALIGNANT NEOPLASM OF UNSPECIFIED KIDNEY, EXCEPT RENAL PELVIS: ICD-10-CM

## 2021-08-05 PROCEDURE — 74183 MRI ABD W/O CNTR FLWD CNTR: CPT | Mod: ME

## 2021-08-05 PROCEDURE — G1004: CPT

## 2021-08-05 PROCEDURE — 74183 MRI ABD W/O CNTR FLWD CNTR: CPT | Mod: 26,ME

## 2021-08-05 PROCEDURE — A9585: CPT

## 2021-08-18 ENCOUNTER — NON-APPOINTMENT (OUTPATIENT)
Age: 86
End: 2021-08-18

## 2021-09-08 ENCOUNTER — NON-APPOINTMENT (OUTPATIENT)
Age: 86
End: 2021-09-08

## 2021-09-28 ENCOUNTER — APPOINTMENT (OUTPATIENT)
Dept: CARDIOLOGY | Facility: CLINIC | Age: 86
End: 2021-09-28
Payer: MEDICARE

## 2021-09-28 ENCOUNTER — NON-APPOINTMENT (OUTPATIENT)
Age: 86
End: 2021-09-28

## 2021-09-28 VITALS
DIASTOLIC BLOOD PRESSURE: 89 MMHG | SYSTOLIC BLOOD PRESSURE: 170 MMHG | HEIGHT: 67 IN | WEIGHT: 162 LBS | BODY MASS INDEX: 25.43 KG/M2 | OXYGEN SATURATION: 99 % | HEART RATE: 75 BPM

## 2021-09-28 LAB
BASOPHILS # BLD AUTO: 0.06 K/UL
BASOPHILS NFR BLD AUTO: 1 %
EOSINOPHIL # BLD AUTO: 0.13 K/UL
EOSINOPHIL NFR BLD AUTO: 2.1 %
HCT VFR BLD CALC: 40.3 %
HGB BLD-MCNC: 13 G/DL
IMM GRANULOCYTES NFR BLD AUTO: 0.3 %
LYMPHOCYTES # BLD AUTO: 0.97 K/UL
LYMPHOCYTES NFR BLD AUTO: 15.5 %
MAN DIFF?: NORMAL
MCHC RBC-ENTMCNC: 28.4 PG
MCHC RBC-ENTMCNC: 32.3 GM/DL
MCV RBC AUTO: 88 FL
MONOCYTES # BLD AUTO: 0.83 K/UL
MONOCYTES NFR BLD AUTO: 13.3 %
NEUTROPHILS # BLD AUTO: 4.23 K/UL
NEUTROPHILS NFR BLD AUTO: 67.8 %
PLATELET # BLD AUTO: 176 K/UL
RBC # BLD: 4.58 M/UL
RBC # FLD: 13.4 %
WBC # FLD AUTO: 6.24 K/UL

## 2021-09-28 PROCEDURE — 99214 OFFICE O/P EST MOD 30 MIN: CPT

## 2021-09-28 PROCEDURE — 93000 ELECTROCARDIOGRAM COMPLETE: CPT

## 2021-09-29 LAB
25(OH)D3 SERPL-MCNC: 28 NG/ML
ALBUMIN SERPL ELPH-MCNC: 4.4 G/DL
ALP BLD-CCNC: 93 U/L
ALT SERPL-CCNC: 10 U/L
ANION GAP SERPL CALC-SCNC: 13 MMOL/L
AST SERPL-CCNC: 18 U/L
BILIRUB SERPL-MCNC: 1.1 MG/DL
BUN SERPL-MCNC: 30 MG/DL
CALCIUM SERPL-MCNC: 9.4 MG/DL
CHLORIDE SERPL-SCNC: 101 MMOL/L
CHOLEST SERPL-MCNC: 132 MG/DL
CO2 SERPL-SCNC: 24 MMOL/L
CREAT SERPL-MCNC: 1.34 MG/DL
ESTIMATED AVERAGE GLUCOSE: 120 MG/DL
GLUCOSE SERPL-MCNC: 91 MG/DL
HBA1C MFR BLD HPLC: 5.8 %
HDLC SERPL-MCNC: 60 MG/DL
LDLC SERPL CALC-MCNC: 56 MG/DL
NONHDLC SERPL-MCNC: 72 MG/DL
POTASSIUM SERPL-SCNC: 4.4 MMOL/L
PROT SERPL-MCNC: 7 G/DL
SODIUM SERPL-SCNC: 137 MMOL/L
TRIGL SERPL-MCNC: 81 MG/DL

## 2021-10-03 NOTE — HISTORY OF PRESENT ILLNESS
[FreeTextEntry1] : Nathanael is feeling well but ambulation still difficult. No CP, palpitations, dizziness or SOB>

## 2021-10-03 NOTE — DISCUSSION/SUMMARY
[___ Month(s)] : in [unfilled] month(s) [FreeTextEntry1] : The patient is an 85-year-old gentleman history of benign prostatic hypertrophy, repeated urinary tract infections, hypothyroidism,coronary artery disease, hypertension, hyperlipidemia, chronic atrial fibrillation, s/p removal of biliary stent who is doing well. \par #1 Afib- rate controlled on atenolol, no significant bleeding on xarelto\par #2 Htn- continue valsartan 320/25mg\par #3 Lipids- continue pravastatin\par #4 CV- no angina or HF on aspirin\par #5 Hypothyroid- on levothyroxine\par #6 - BPH and prostate ca, on finasteride, f/u urology

## 2021-10-03 NOTE — PHYSICAL EXAM
[Well Developed] : well developed [Well Nourished] : well nourished [No Acute Distress] : no acute distress [Normal Conjunctiva] : normal conjunctiva [Normal Venous Pressure] : normal venous pressure [No Carotid Bruit] : no carotid bruit [Normal S1, S2] : normal S1, S2 [No Murmur] : no murmur [No Gallop] : no gallop [No Rub] : no rub [Clear Lung Fields] : clear lung fields [Good Air Entry] : good air entry [No Respiratory Distress] : no respiratory distress  [Soft] : abdomen soft [Non Tender] : non-tender [No Masses/organomegaly] : no masses/organomegaly [Normal Bowel Sounds] : normal bowel sounds [Normal Gait] : normal gait [No Cyanosis] : no cyanosis [No Edema] : no edema [No Clubbing] : no clubbing [No Varicosities] : no varicosities [No Rash] : no rash [No Skin Lesions] : no skin lesions [Moves all extremities] : moves all extremities [No Focal Deficits] : no focal deficits [Normal Speech] : normal speech [Alert and Oriented] : alert and oriented [Normal memory] : normal memory [de-identified] : irreg irreg

## 2021-11-06 ENCOUNTER — EMERGENCY (EMERGENCY)
Facility: HOSPITAL | Age: 86
LOS: 0 days | Discharge: HOME | End: 2021-11-06
Attending: EMERGENCY MEDICINE
Payer: MEDICARE

## 2021-11-06 VITALS
HEART RATE: 97 BPM | RESPIRATION RATE: 18 BRPM | TEMPERATURE: 98 F | WEIGHT: 160.06 LBS | SYSTOLIC BLOOD PRESSURE: 175 MMHG | DIASTOLIC BLOOD PRESSURE: 80 MMHG | OXYGEN SATURATION: 100 % | HEIGHT: 65 IN

## 2021-11-06 DIAGNOSIS — R04.0 EPISTAXIS: ICD-10-CM

## 2021-11-06 PROCEDURE — 30903 CONTROL OF NOSEBLEED: CPT | Mod: LT

## 2021-11-06 PROCEDURE — 30905 CONTROL OF NOSEBLEED: CPT | Mod: LT

## 2021-11-06 PROCEDURE — 99283 EMERGENCY DEPT VISIT LOW MDM: CPT | Mod: 25

## 2021-11-06 RX ORDER — CEPHALEXIN 500 MG
250 CAPSULE ORAL ONCE
Refills: 0 | Status: COMPLETED | OUTPATIENT
Start: 2021-11-06 | End: 2021-11-06

## 2021-11-06 RX ORDER — TRANEXAMIC ACID 100 MG/ML
5 INJECTION, SOLUTION INTRAVENOUS ONCE
Refills: 0 | Status: DISCONTINUED | OUTPATIENT
Start: 2021-11-06 | End: 2021-11-06

## 2021-11-06 RX ORDER — CEPHALEXIN 500 MG
1 CAPSULE ORAL
Qty: 6 | Refills: 0
Start: 2021-11-06 | End: 2021-11-08

## 2021-11-06 RX ADMIN — Medication 250 MILLIGRAM(S): at 21:14

## 2021-11-06 NOTE — ED STATDOCS - PATIENT PORTAL LINK FT
You can access the FollowMyHealth Patient Portal offered by [facility] by registering at the following website: [email or web address]. By joining [facility]’s FollowMyHealth portal, you will also be able to view your health information using other applications (apps) compatible with our system.

## 2021-11-06 NOTE — ED STATDOCS - NSFOLLOWUPINSTRUCTIONS_ED_ALL_ED_FT
Please follow up with your primary care provider for further concerns you may have regarding your general health. Attached you will find your results from today's visit. Continue taking your medications as prescribed and keep your upcoming medical appointments.    Follow up with the ENT on Monday as we discussed and please take your antibiotics as prescribed.

## 2021-11-06 NOTE — ED STATDOCS - NSICDXPASTMEDICALHX_GEN_ALL_CORE_FT
PAST MEDICAL HISTORY:  Afib     CAD (coronary artery disease)     S/P coronary artery stent placement

## 2021-11-06 NOTE — ED PROCEDURE NOTE - CPROC ED INFORMED CONSENT1
Benefits, risks, and possible complications of procedure explained to patient/caregiver who verbalized understanding and gave verbal consent. Admitted for cellulitis

## 2021-11-06 NOTE — ED STATDOCS - ATTENDING CONTRIBUTION TO CARE
Attending Contribution to Care: I, Benita Otoole, performed the initial face to face bedside interview with this patient regarding history of present illness, review of symptoms and relevant past medical, social and family history.  I completed an independent physical examination.  I was the initial provider who evaluated this patient. I have signed out the follow up of any pending tests (i.e. labs, radiological studies) to the resident.  I have communicated the patient’s plan of care and disposition with the resident.

## 2021-11-06 NOTE — ED STATDOCS - ENMT, MLM
Mouth with normal mucosa  Throat has no vesicles, no oropharyngeal exudates and uvula is midline. (+) erythema and area of punctate bright red bleeding on the L anterior nasal plexus; No blood in posterior pharynx

## 2021-11-06 NOTE — ED STATDOCS - OBJECTIVE STATEMENT
85 y/o M with a PMHx of CAD, s/p cardiac stents, Afib on ASA and Xarelto, epistaxis presents to the ED c/o epistaxis. Pt needed a cauterization by Dr. Simms 2 years ago. Presents to the ED today with 2 hours of L sided nose bleeding that spontaneously happened while he was standing. Pt felt a sensation in his nose and went to feel his nose and saw blood in hand. Daughter in law is a nurse who states that the bleeding was hard to control so she brought the pt to the ED for further evaluation. No nose pain, HA, dizziness. No choking on blood, trouble speaking. No trauma to the nose.

## 2021-11-06 NOTE — ED ADULT TRIAGE NOTE - WEIGHT IN LBS
Thrombocytopenia Acute renal failure superimposed on stage 3 chronic kidney disease, unspecified acute renal failure type Acute renal failure superimposed on stage 3 chronic kidney disease, unspecified acute renal failure type Acute renal failure superimposed on stage 3 chronic kidney disease, unspecified acute renal failure type Hemolytic anemia Hemolytic anemia Hemolytic anemia Hyperkalemia Hyperkalemia Hyperkalemia Hyperkalemia Hyperkalemia Hyperkalemia Thrombocytopenia Thrombocytopenia Thrombocytopenia Hemolytic anemia 160

## 2021-11-06 NOTE — ED STATDOCS - PROGRESS NOTE DETAILS
Nico PGY3: rhino rocket placed for persistent epistaxis. Pt pending keflex, will dc w/ ENT follow up for monday.

## 2021-11-06 NOTE — ED ADULT NURSE NOTE - CHIEF COMPLAINT
The patient is a 86y Male complaining of epistaxis.
I will START or STAY ON the medications listed below when I get home from the hospital:    aspirin 81 mg oral delayed release tablet  -- 1 tab(s) by mouth once a day  -- Indication: For Heart failure    losartan 25 mg oral tablet  -- 1 tab(s) by mouth once a day  -- Indication: For Htn    doxazosin 4 mg oral tablet  -- 1 tab(s) by mouth once a day at bedtime  -- Indication: For Htn    isosorbide mononitrate 30 mg oral tablet, extended release  -- 1 tab(s) by mouth once a day  -- Indication: For Heart failure    enoxaparin  -- 40 milligram(s) subcutaneous once a day  -- Indication: For dvt proph    carvedilol 25 mg oral tablet  -- 1 tab(s) by mouth every 12 hours  -- Indication: For Htn    albuterol 2.5 mg/3 mL (0.083%) inhalation solution  -- 1 dose(s) inhaled every 8 hours  -- Indication: For breathing    furosemide 40 mg oral tablet  -- 1 tab(s) by mouth once a day  -- Indication: For CHF (congestive heart failure)    guaiFENesin 100 mg/5 mL oral liquid  -- 10 milliliter(s) by mouth every 8 hours, As Needed for cough  -- Indication: For Cough    famotidine 20 mg oral tablet  -- 1 tab(s) by mouth once a day  -- Indication: For gerd    potassium chloride 20 mEq oral powder for reconstitution  -- 1 packet(s) by mouth once a day  -- Indication: For Potassium    Sensipar 30 mg oral tablet  -- 1 tab(s) by mouth once a day (at bedtime)  -- Indication: For Hyperparathyroidism    lactobacillus acidophilus oral capsule  -- 1 cap(s) by mouth 3 times a day  -- Indication: For Probiotic    hydrALAZINE 10 mg oral tablet  -- 1 tab(s) by mouth  noon daily  -- Indication: For Htn    hydrALAZINE 10 mg oral tablet  -- 2 tab(s) by mouth 2 times a day At am and pm  -- Indication: For Htn

## 2021-11-08 ENCOUNTER — APPOINTMENT (OUTPATIENT)
Dept: OTOLARYNGOLOGY | Facility: CLINIC | Age: 86
End: 2021-11-08
Payer: MEDICARE

## 2021-11-08 VITALS
DIASTOLIC BLOOD PRESSURE: 52 MMHG | SYSTOLIC BLOOD PRESSURE: 170 MMHG | HEIGHT: 67 IN | BODY MASS INDEX: 25.43 KG/M2 | WEIGHT: 162 LBS | HEART RATE: 75 BPM | TEMPERATURE: 97.9 F

## 2021-11-08 PROCEDURE — 99213 OFFICE O/P EST LOW 20 MIN: CPT

## 2021-11-08 NOTE — HISTORY OF PRESENT ILLNESS
[de-identified] : sp epistaxis left to ER balloon placed  2 d ago\par no further bleeding\par Xeralto and asa 81 daily\par rx epistaxis more than 3 y ago

## 2021-11-08 NOTE — PHYSICAL EXAM
[de-identified] : nasostt balloon in place left nares no active bleeding [Midline] : trachea located in midline position [Normal] : no rashes

## 2021-11-08 NOTE — ASSESSMENT
[FreeTextEntry1] : left epistaxis rx 2 d ag w balloon in ER\par no active bleeding\par rec leaving balloon 2 more days\par check w Cardiologist re dc Xarelto for 3 or 4 days\par fu 2 days

## 2021-11-10 ENCOUNTER — APPOINTMENT (OUTPATIENT)
Dept: OTOLARYNGOLOGY | Facility: CLINIC | Age: 86
End: 2021-11-10
Payer: MEDICARE

## 2021-11-10 VITALS — HEIGHT: 67 IN | TEMPERATURE: 97.6 F | WEIGHT: 162 LBS | BODY MASS INDEX: 25.43 KG/M2

## 2021-11-10 PROCEDURE — 30903 CONTROL OF NOSEBLEED: CPT | Mod: LT

## 2021-11-10 PROCEDURE — 99213 OFFICE O/P EST LOW 20 MIN: CPT | Mod: 25

## 2021-11-10 PROCEDURE — 31231 NASAL ENDOSCOPY DX: CPT

## 2021-11-10 NOTE — PROCEDURE
[FreeTextEntry2] : epistaxis left [FreeTextEntry3] : The left nasal passage was cleared. A bleeding site is noted along the\par Anterior/inferior septum\par \par Posterior septal area\par An active bleeding vessel was located.\par Topical Xylocaine and Edis-Synephrine was then applied on a cotton ball.\par Cauterization was then performed with silver nitrate. Pressure was applied with a cotton ball and it  was left in place for 10 minutes.\par There was cessation of bleeding.\par  [FreeTextEntry6] : Indication:  Unable to adequately examine nasal passages and sinus drainage with anterior rhinoscopy.\par The patient has recurrent epistaxis\par \par Scope # 44\par Mild septal deviation is present on direct visualization on either side.\par Both inferior nasal turbinates are moderate in size with normal  appearing mucosa. \par The sinus endoscope was introduced into the right nares\par exam right middle meatus reveals no mucopus, polyps or inflammation.  The middle turbinate is unremarkable.\par The scope was advanced and the sphenoethmoid region was inspected.\par The superior meatus and nasal vault are unremarkable.  The nasopharynx is unremarkable without inflammation or mass\par The sinus endoscope was introduced into the left nares\par exam of the left middle meatus reveals no mucopus, polyps or inflammation and the left middle turbinate is unremarkable.\par The scope was advanced and the sphenoethmoid region was inspected.\par The left superior meatus and nasal vault are unremarkable.\par bleeding from left mid septal area\par not posterior bleed\par

## 2021-11-10 NOTE — PHYSICAL EXAM
[Nasal Endoscopy Performed] : nasal endoscopy was performed, see procedure section for findings [Midline] : trachea located in midline position [Normal] : no rashes [de-identified] : balloon removed

## 2022-01-08 ENCOUNTER — RX RENEWAL (OUTPATIENT)
Age: 87
End: 2022-01-08

## 2022-02-08 ENCOUNTER — NON-APPOINTMENT (OUTPATIENT)
Age: 87
End: 2022-02-08

## 2022-02-08 ENCOUNTER — APPOINTMENT (OUTPATIENT)
Dept: CARDIOLOGY | Facility: CLINIC | Age: 87
End: 2022-02-08
Payer: MEDICARE

## 2022-02-08 VITALS — SYSTOLIC BLOOD PRESSURE: 148 MMHG | DIASTOLIC BLOOD PRESSURE: 78 MMHG

## 2022-02-08 VITALS
OXYGEN SATURATION: 100 % | WEIGHT: 160 LBS | BODY MASS INDEX: 25.11 KG/M2 | SYSTOLIC BLOOD PRESSURE: 162 MMHG | HEIGHT: 67 IN | DIASTOLIC BLOOD PRESSURE: 81 MMHG | HEART RATE: 71 BPM

## 2022-02-08 PROBLEM — I48.91 UNSPECIFIED ATRIAL FIBRILLATION: Chronic | Status: ACTIVE | Noted: 2021-11-16

## 2022-02-08 PROBLEM — I25.10 ATHEROSCLEROTIC HEART DISEASE OF NATIVE CORONARY ARTERY WITHOUT ANGINA PECTORIS: Chronic | Status: ACTIVE | Noted: 2021-11-16

## 2022-02-08 PROBLEM — Z95.5 PRESENCE OF CORONARY ANGIOPLASTY IMPLANT AND GRAFT: Chronic | Status: ACTIVE | Noted: 2021-11-16

## 2022-02-08 PROCEDURE — 99214 OFFICE O/P EST MOD 30 MIN: CPT

## 2022-02-08 PROCEDURE — 93000 ELECTROCARDIOGRAM COMPLETE: CPT

## 2022-02-12 NOTE — DISCUSSION/SUMMARY
[FreeTextEntry1] : The patient is an 86-year-old gentleman history of benign prostatic hypertrophy, repeated urinary tract infections, hypothyroidism,coronary artery disease, hypertension, hyperlipidemia, chronic atrial fibrillation, s/p removal of biliary stent who is doing well. \par #1 Afib- rate controlled on atenolol, no significant bleeding on xarelto\par #2 Htn- continue valsartan 320/25mg\par #3 Lipids- continue pravastatin\par #4 CV- no angina or HF on aspirin\par #5 Hypothyroid- on levothyroxine\par #6 - BPH and prostate ca, on finasteride, f/u urology

## 2022-02-12 NOTE — PHYSICAL EXAM

## 2022-04-01 ENCOUNTER — RX RENEWAL (OUTPATIENT)
Age: 87
End: 2022-04-01

## 2022-05-16 ENCOUNTER — RX RENEWAL (OUTPATIENT)
Age: 87
End: 2022-05-16

## 2022-06-09 ENCOUNTER — APPOINTMENT (OUTPATIENT)
Dept: CARDIOLOGY | Facility: CLINIC | Age: 87
End: 2022-06-09

## 2022-07-05 ENCOUNTER — LABORATORY RESULT (OUTPATIENT)
Age: 87
End: 2022-07-05

## 2022-07-05 ENCOUNTER — NON-APPOINTMENT (OUTPATIENT)
Age: 87
End: 2022-07-05

## 2022-07-05 ENCOUNTER — APPOINTMENT (OUTPATIENT)
Dept: CARDIOLOGY | Facility: CLINIC | Age: 87
End: 2022-07-05

## 2022-07-05 VITALS
BODY MASS INDEX: 25.11 KG/M2 | DIASTOLIC BLOOD PRESSURE: 73 MMHG | WEIGHT: 160 LBS | HEART RATE: 77 BPM | OXYGEN SATURATION: 100 % | SYSTOLIC BLOOD PRESSURE: 144 MMHG | HEIGHT: 67 IN

## 2022-07-05 PROCEDURE — 99213 OFFICE O/P EST LOW 20 MIN: CPT

## 2022-07-05 PROCEDURE — 93000 ELECTROCARDIOGRAM COMPLETE: CPT

## 2022-07-05 RX ORDER — OXYCODONE AND ACETAMINOPHEN 7.5; 325 MG/1; MG/1
7.5-325 TABLET ORAL
Qty: 90 | Refills: 0 | Status: ACTIVE | COMMUNITY
Start: 2022-06-09

## 2022-07-06 LAB
25(OH)D3 SERPL-MCNC: 39.3 NG/ML
ALBUMIN SERPL ELPH-MCNC: 3.9 G/DL
ALP BLD-CCNC: 94 U/L
ALT SERPL-CCNC: 7 U/L
ANION GAP SERPL CALC-SCNC: 13 MMOL/L
AST SERPL-CCNC: 15 U/L
BASOPHILS # BLD AUTO: 0.05 K/UL
BASOPHILS NFR BLD AUTO: 0.6 %
BILIRUB SERPL-MCNC: 0.8 MG/DL
BUN SERPL-MCNC: 36 MG/DL
CALCIUM SERPL-MCNC: 9 MG/DL
CHLORIDE SERPL-SCNC: 102 MMOL/L
CHOLEST SERPL-MCNC: 124 MG/DL
CO2 SERPL-SCNC: 22 MMOL/L
CREAT SERPL-MCNC: 1.45 MG/DL
EGFR: 47 ML/MIN/1.73M2
EOSINOPHIL # BLD AUTO: 0.04 K/UL
EOSINOPHIL NFR BLD AUTO: 0.5 %
ESTIMATED AVERAGE GLUCOSE: 114 MG/DL
GLUCOSE SERPL-MCNC: 105 MG/DL
HBA1C MFR BLD HPLC: 5.6 %
HCT VFR BLD CALC: 36.3 %
HDLC SERPL-MCNC: 57 MG/DL
HGB BLD-MCNC: 11.1 G/DL
IMM GRANULOCYTES NFR BLD AUTO: 0.4 %
LDLC SERPL CALC-MCNC: 52 MG/DL
LYMPHOCYTES # BLD AUTO: 0.95 K/UL
LYMPHOCYTES NFR BLD AUTO: 11.2 %
MAN DIFF?: NORMAL
MCHC RBC-ENTMCNC: 28 PG
MCHC RBC-ENTMCNC: 30.6 GM/DL
MCV RBC AUTO: 91.4 FL
MONOCYTES # BLD AUTO: 0.98 K/UL
MONOCYTES NFR BLD AUTO: 11.6 %
NEUTROPHILS # BLD AUTO: 6.42 K/UL
NEUTROPHILS NFR BLD AUTO: 75.7 %
NONHDLC SERPL-MCNC: 67 MG/DL
PLATELET # BLD AUTO: 250 K/UL
POTASSIUM SERPL-SCNC: 4.6 MMOL/L
PROT SERPL-MCNC: 7.3 G/DL
RBC # BLD: 3.97 M/UL
RBC # FLD: 14.5 %
SODIUM SERPL-SCNC: 137 MMOL/L
TRIGL SERPL-MCNC: 77 MG/DL
TSH SERPL-ACNC: 10 UIU/ML
VIT B12 SERPL-MCNC: 507 PG/ML
WBC # FLD AUTO: 8.47 K/UL

## 2022-07-06 NOTE — HISTORY OF PRESENT ILLNESS
[FreeTextEntry1] : Nathanael is frustrated today. Trouble getting here. Cost of medication. No CP, palpitations, dizziness or SOB. Ambulates with cane.

## 2022-07-06 NOTE — PHYSICAL EXAM

## 2022-07-06 NOTE — DISCUSSION/SUMMARY
[EKG obtained to assist in diagnosis and management of assessed problem(s)] : EKG obtained to assist in diagnosis and management of assessed problem(s) [___ Month(s)] : in [unfilled] month(s) [FreeTextEntry1] : The patient is an 87-year-old gentleman history of benign prostatic hypertrophy, repeated urinary tract infections, hypothyroidism,coronary artery disease, hypertension, hyperlipidemia, chronic atrial fibrillation, s/p removal of biliary stent who is losing weight.\par #1 Afib- rate controlled on atenolol, no significant bleeding on xarelto, labs today\par #2 Htn- continue valsartan 320/25mg\par #3 Lipids- continue pravastatin\par #4 CV- no angina or HF on aspirin\par #5 Hypothyroid- on levothyroxine\par #6 - BPH and prostate ca, on finasteride, f/u urology

## 2022-07-13 ENCOUNTER — RX RENEWAL (OUTPATIENT)
Age: 87
End: 2022-07-13

## 2022-08-04 ENCOUNTER — RX RENEWAL (OUTPATIENT)
Age: 87
End: 2022-08-04

## 2022-08-10 ENCOUNTER — RX RENEWAL (OUTPATIENT)
Age: 87
End: 2022-08-10

## 2022-08-24 ENCOUNTER — EMERGENCY (EMERGENCY)
Facility: HOSPITAL | Age: 87
LOS: 0 days | Discharge: HOME | End: 2022-08-24
Attending: EMERGENCY MEDICINE
Payer: MEDICARE

## 2022-08-24 VITALS
SYSTOLIC BLOOD PRESSURE: 150 MMHG | HEART RATE: 78 BPM | OXYGEN SATURATION: 98 % | DIASTOLIC BLOOD PRESSURE: 75 MMHG | TEMPERATURE: 98 F | RESPIRATION RATE: 18 BRPM

## 2022-08-24 VITALS — HEIGHT: 65 IN | WEIGHT: 145.06 LBS

## 2022-08-24 DIAGNOSIS — Z95.5 PRESENCE OF CORONARY ANGIOPLASTY IMPLANT AND GRAFT: ICD-10-CM

## 2022-08-24 DIAGNOSIS — R04.0 EPISTAXIS: ICD-10-CM

## 2022-08-24 DIAGNOSIS — I48.91 UNSPECIFIED ATRIAL FIBRILLATION: ICD-10-CM

## 2022-08-24 DIAGNOSIS — I25.10 ATHEROSCLEROTIC HEART DISEASE OF NATIVE CORONARY ARTERY WITHOUT ANGINA PECTORIS: ICD-10-CM

## 2022-08-24 DIAGNOSIS — Z79.01 LONG TERM (CURRENT) USE OF ANTICOAGULANTS: ICD-10-CM

## 2022-08-24 PROCEDURE — 99282 EMERGENCY DEPT VISIT SF MDM: CPT | Mod: 25

## 2022-08-24 PROCEDURE — 30903 CONTROL OF NOSEBLEED: CPT

## 2022-08-24 PROCEDURE — 30905 CONTROL OF NOSEBLEED: CPT | Mod: LT

## 2022-08-24 PROCEDURE — 99283 EMERGENCY DEPT VISIT LOW MDM: CPT | Mod: 25

## 2022-08-24 NOTE — ED PROVIDER NOTE - NSFOLLOWUPINSTRUCTIONS_ED_ALL_ED_FT
please follow up with your ENT doctor in 2-3 days.   keep nasal tampon in place until you follow up.   return to ED for worsening persistent bleeding or any concerns

## 2022-08-24 NOTE — ED PROVIDER NOTE - PATIENT PORTAL LINK FT
You can access the FollowMyHealth Patient Portal offered by [facility] by registering at the following website: [email or web address]. By joining [facility]’s FollowMyHealth portal, you will also be able to view your health information using other applications (apps) compatible with our system.

## 2022-08-24 NOTE — ED ADULT TRIAGE NOTE - CHIEF COMPLAINT QUOTE
nose bleeding for past hour, unable to control bleeding at home. fourth episode of nose bleeding this week. on xarelto.   last year required cauterization for same.

## 2022-08-24 NOTE — ED PROVIDER NOTE - OBJECTIVE STATEMENT
88 y/o male in ED c/o recurrent epistaxis left nare x 1 wk worsening tonight.   states on Xarelto.   states h/o bleeding in the past and had to have bleeding cauterized by ENT.   pt denies any fever, HA, cp, sob, n/v/d/abd pain.

## 2022-08-24 NOTE — ED PROVIDER NOTE - CHPI ED SYMPTOMS NEG
Detail Level: Simple
no fever/no vomiting

## 2022-08-29 ENCOUNTER — APPOINTMENT (OUTPATIENT)
Dept: OTOLARYNGOLOGY | Facility: CLINIC | Age: 87
End: 2022-08-29

## 2022-08-29 VITALS — WEIGHT: 150 LBS | BODY MASS INDEX: 26.58 KG/M2 | TEMPERATURE: 97.2 F | HEIGHT: 63 IN

## 2022-08-29 PROCEDURE — 99213 OFFICE O/P EST LOW 20 MIN: CPT | Mod: 25

## 2022-08-29 PROCEDURE — 31231 NASAL ENDOSCOPY DX: CPT

## 2022-08-29 PROCEDURE — 30903 CONTROL OF NOSEBLEED: CPT | Mod: LT,59

## 2022-08-29 RX ORDER — AMOXICILLIN 875 MG/1
875 TABLET, FILM COATED ORAL
Qty: 20 | Refills: 2 | Status: ACTIVE | COMMUNITY
Start: 2022-08-29 | End: 1900-01-01

## 2022-08-29 NOTE — HISTORY OF PRESENT ILLNESS
[de-identified] : fu re nose bleed\par to ER and balloon placed 5 d ago left nose\par still w some bleeding\par daily asa xeralto

## 2022-08-29 NOTE — PROCEDURE
[FreeTextEntry2] : epistaxis left [FreeTextEntry3] : The left nasal passage was cleared. 7 cm rhino balloon removed\par A bleeding site is noted along the\par \par Anterior/inferior septum\par posterior/mid septal area\par \par An active bleeding vessel was located.\par Topical Xylocaine and Edis-Synephrine was then applied on a cotton ball.\par Cauterization was then performed with silver nitrate. Pressure was applied with a cotton ball and it  was left in place for 10 minutes.\par There was cessation of bleeding.\par  [FreeTextEntry6] : Indication:  Unable to adequately examine nasal passages and sinus drainage with anterior rhinoscopy.\par The patient has recurrent epistaxis\par \par Scope # 47\par Mild septal deviation is present on direct visualization on either side.\par Both inferior nasal turbinates are moderate in size with normal  appearing mucosa. \par The sinus endoscope was introduced into the right nares\par exam right middle meatus reveals no mucopus, polyps or inflammation.  The middle turbinate is unremarkable.\par The scope was advanced and the sphenoethmoid region was inspected.\par The superior meatus and nasal vault are unremarkable.  The nasopharynx is unremarkable without inflammation or mass\par The sinus endoscope was introduced into the left nares\par exam of the left middle meatus reveals no mucopus, polyps or inflammation and the left middle turbinate is unremarkable.\par The scope was advanced and the sphenoethmoid region was inspected.\par The left superior meatus and nasal vault are unremarkable\par bleeding from posterior lowre septum

## 2022-08-29 NOTE — ASSESSMENT
[FreeTextEntry1] : recurrent left epistaxis\par sp balloon removal\par ag nitrate cautery and placement of vaseline gauze packing\par amoxicillin 875\par dc Xarelto if possible for 3-4 d\par fu 2 d

## 2022-08-31 ENCOUNTER — APPOINTMENT (OUTPATIENT)
Dept: OTOLARYNGOLOGY | Facility: CLINIC | Age: 87
End: 2022-08-31

## 2022-08-31 VITALS — WEIGHT: 150 LBS | BODY MASS INDEX: 26.58 KG/M2 | TEMPERATURE: 97.6 F | HEIGHT: 63 IN

## 2022-08-31 DIAGNOSIS — D68.8 OTHER SPECIFIED COAGULATION DEFECTS: ICD-10-CM

## 2022-08-31 DIAGNOSIS — J34.2 DEVIATED NASAL SEPTUM: ICD-10-CM

## 2022-08-31 DIAGNOSIS — R04.0 EPISTAXIS: ICD-10-CM

## 2022-08-31 PROCEDURE — 99213 OFFICE O/P EST LOW 20 MIN: CPT | Mod: 25

## 2022-08-31 PROCEDURE — 30903 CONTROL OF NOSEBLEED: CPT | Mod: LT

## 2022-08-31 NOTE — REASON FOR VISIT
[Subsequent Evaluation] : a subsequent evaluation for [Epistaxis] : epistaxis [FreeTextEntry2] : packing

## 2022-08-31 NOTE — ASSESSMENT
[FreeTextEntry1] : vaseline pack out\par re cautery smalll area raw mucosa\par avoid nose blowing\par saline irrigation prn\par resume xeralta 48 hrs\par fu prn\par

## 2022-08-31 NOTE — HISTORY OF PRESENT ILLNESS
[de-identified] : fu re left epistaxis\par  sp balloon x 5 d\par sp vaseline packing x 2 d\par off xeralto x 2 d now

## 2022-08-31 NOTE — PROCEDURE
[FreeTextEntry2] : epistaxis left [FreeTextEntry3] : The left nasal passage was cleared. A bleeding site is noted along the\par Anterior/inferior septum\par minimal bleeding inferior raw mucosa bleeding vessel was located.\par Topical Xylocaine and Edis-Synephrine was then applied on a cotton ball.\par Cauterization was then performed with silver nitrate. Pressure was applied with a cotton ball and it  was left in place for 10 minutes.\par There was cessation of bleeding.\par

## 2022-09-28 NOTE — ED ADULT NURSE NOTE - NSFALLRSKASSESSTYPE_ED_ALL_ED
"----- Message from Dar Fraire M.D. sent at 9/28/2022  3:31 PM PDT -----  Regarding: RE: Persistant Cough & Mucus  Aysha:       Let's try medrol dose pack.  Dr. MCLEAN  ----- Message -----  From: Oksana Berkowitz R.N.  Sent: 9/28/2022   1:41 PM PDT  To: Dar Fraire M.D.  Subject: Persistant Cough & Mucus                         Dr Sosa    Ana Paula completed her Azithromycin on Sept 26 for bronchitis.  She reports that her sputum has changed from green to white/clear.  She is concerned because she still has so much mucus in her chest - she hears herself \"rattling.\"  Minimal wheeze.  Any activity/exertion makes her cough.  She is using her Trelegy Inhaler daily.  SPO2 today with home health = 92-93%  She is homebound, it would be difficult to get a CXR.  Any suggestions?  Thank you,  Aysha"
Initial (On Arrival)

## 2022-10-25 ENCOUNTER — NON-APPOINTMENT (OUTPATIENT)
Age: 87
End: 2022-10-25

## 2022-10-25 ENCOUNTER — APPOINTMENT (OUTPATIENT)
Dept: CARDIOLOGY | Facility: CLINIC | Age: 87
End: 2022-10-25

## 2022-10-25 VITALS
HEART RATE: 72 BPM | HEIGHT: 63 IN | WEIGHT: 149 LBS | OXYGEN SATURATION: 99 % | SYSTOLIC BLOOD PRESSURE: 165 MMHG | DIASTOLIC BLOOD PRESSURE: 83 MMHG | BODY MASS INDEX: 26.4 KG/M2

## 2022-10-25 DIAGNOSIS — E78.5 HYPERLIPIDEMIA, UNSPECIFIED: ICD-10-CM

## 2022-10-25 DIAGNOSIS — I48.91 UNSPECIFIED ATRIAL FIBRILLATION: ICD-10-CM

## 2022-10-25 DIAGNOSIS — I25.10 ATHEROSCLEROTIC HEART DISEASE OF NATIVE CORONARY ARTERY W/OUT ANGINA PECTORIS: ICD-10-CM

## 2022-10-25 DIAGNOSIS — I10 ESSENTIAL (PRIMARY) HYPERTENSION: ICD-10-CM

## 2022-10-25 PROCEDURE — 99213 OFFICE O/P EST LOW 20 MIN: CPT

## 2022-10-25 PROCEDURE — 93000 ELECTROCARDIOGRAM COMPLETE: CPT

## 2022-10-25 RX ORDER — LEVOTHYROXINE SODIUM 0.14 MG/1
137 TABLET ORAL
Qty: 90 | Refills: 0 | Status: DISCONTINUED | COMMUNITY
Start: 2022-08-10 | End: 2022-10-25

## 2022-10-26 NOTE — PHYSICAL EXAM

## 2022-10-26 NOTE — HISTORY OF PRESENT ILLNESS
[FreeTextEntry1] : Nathanael continues to struggle walking even with the cane. No CP, palpitations or SOB.

## 2022-10-26 NOTE — DISCUSSION/SUMMARY
[FreeTextEntry1] : The patient is an 87-year-old gentleman history of benign prostatic hypertrophy, repeated urinary tract infections, hypothyroidism,coronary artery disease, hypertension, hyperlipidemia, chronic atrial fibrillation, s/p removal of biliary stent who is losing weight.\par #1 Afib- rate controlled on atenolol, no significant bleeding on xarelto, labs today\par #2 Htn- continue valsartan 320/25mg\par #3 Lipids- continue pravastatin\par #4 CV- no angina or HF on aspirin\par #5 Hypothyroid- on levothyroxine\par #6 - BPH and prostate ca, on finasteride, f/u urology [EKG obtained to assist in diagnosis and management of assessed problem(s)] : EKG obtained to assist in diagnosis and management of assessed problem(s)

## 2022-12-12 ENCOUNTER — RX RENEWAL (OUTPATIENT)
Age: 87
End: 2022-12-12

## 2023-02-09 ENCOUNTER — APPOINTMENT (OUTPATIENT)
Dept: CARDIOLOGY | Facility: CLINIC | Age: 88
End: 2023-02-09

## 2023-02-12 ENCOUNTER — INPATIENT (INPATIENT)
Facility: HOSPITAL | Age: 88
LOS: 2 days | Discharge: HOME HEALTH | DRG: 378 | End: 2023-02-15
Attending: HOSPITALIST | Admitting: FAMILY MEDICINE
Payer: MEDICARE

## 2023-02-12 VITALS
SYSTOLIC BLOOD PRESSURE: 113 MMHG | RESPIRATION RATE: 18 BRPM | HEIGHT: 63 IN | TEMPERATURE: 99 F | WEIGHT: 147.05 LBS | OXYGEN SATURATION: 93 % | HEART RATE: 107 BPM | DIASTOLIC BLOOD PRESSURE: 70 MMHG

## 2023-02-12 DIAGNOSIS — K92.2 GASTROINTESTINAL HEMORRHAGE, UNSPECIFIED: ICD-10-CM

## 2023-02-12 DIAGNOSIS — R77.8 OTHER SPECIFIED ABNORMALITIES OF PLASMA PROTEINS: ICD-10-CM

## 2023-02-12 DIAGNOSIS — I50.9 HEART FAILURE, UNSPECIFIED: ICD-10-CM

## 2023-02-12 LAB
ALBUMIN SERPL ELPH-MCNC: 3.3 G/DL — SIGNIFICANT CHANGE UP (ref 3.3–5)
ALP SERPL-CCNC: 80 U/L — SIGNIFICANT CHANGE UP (ref 40–120)
ALT FLD-CCNC: 12 U/L — SIGNIFICANT CHANGE UP (ref 12–78)
ANION GAP SERPL CALC-SCNC: 5 MMOL/L — SIGNIFICANT CHANGE UP (ref 5–17)
AST SERPL-CCNC: 20 U/L — SIGNIFICANT CHANGE UP (ref 15–37)
BASOPHILS # BLD AUTO: 0.04 K/UL — SIGNIFICANT CHANGE UP (ref 0–0.2)
BASOPHILS NFR BLD AUTO: 0.6 % — SIGNIFICANT CHANGE UP (ref 0–2)
BILIRUB SERPL-MCNC: 0.9 MG/DL — SIGNIFICANT CHANGE UP (ref 0.2–1.2)
BLD GP AB SCN SERPL QL: SIGNIFICANT CHANGE UP
BUN SERPL-MCNC: 56 MG/DL — HIGH (ref 7–23)
CALCIUM SERPL-MCNC: 9 MG/DL — SIGNIFICANT CHANGE UP (ref 8.5–10.1)
CHLORIDE SERPL-SCNC: 102 MMOL/L — SIGNIFICANT CHANGE UP (ref 96–108)
CO2 SERPL-SCNC: 29 MMOL/L — SIGNIFICANT CHANGE UP (ref 22–31)
CREAT SERPL-MCNC: 1.74 MG/DL — HIGH (ref 0.5–1.3)
EGFR: 38 ML/MIN/1.73M2 — LOW
EOSINOPHIL # BLD AUTO: 0.03 K/UL — SIGNIFICANT CHANGE UP (ref 0–0.5)
EOSINOPHIL NFR BLD AUTO: 0.4 % — SIGNIFICANT CHANGE UP (ref 0–6)
FLUAV AG NPH QL: SIGNIFICANT CHANGE UP
FLUBV AG NPH QL: SIGNIFICANT CHANGE UP
GLUCOSE SERPL-MCNC: 106 MG/DL — HIGH (ref 70–99)
HCT VFR BLD CALC: 34.4 % — LOW (ref 39–50)
HGB BLD-MCNC: 11 G/DL — LOW (ref 13–17)
IMM GRANULOCYTES NFR BLD AUTO: 0.1 % — SIGNIFICANT CHANGE UP (ref 0–0.9)
LYMPHOCYTES # BLD AUTO: 1.04 K/UL — SIGNIFICANT CHANGE UP (ref 1–3.3)
LYMPHOCYTES # BLD AUTO: 15.4 % — SIGNIFICANT CHANGE UP (ref 13–44)
MCHC RBC-ENTMCNC: 29.6 PG — SIGNIFICANT CHANGE UP (ref 27–34)
MCHC RBC-ENTMCNC: 32 GM/DL — SIGNIFICANT CHANGE UP (ref 32–36)
MCV RBC AUTO: 92.5 FL — SIGNIFICANT CHANGE UP (ref 80–100)
MONOCYTES # BLD AUTO: 1.08 K/UL — HIGH (ref 0–0.9)
MONOCYTES NFR BLD AUTO: 16 % — HIGH (ref 2–14)
NEUTROPHILS # BLD AUTO: 4.57 K/UL — SIGNIFICANT CHANGE UP (ref 1.8–7.4)
NEUTROPHILS NFR BLD AUTO: 67.5 % — SIGNIFICANT CHANGE UP (ref 43–77)
NT-PROBNP SERPL-SCNC: HIGH PG/ML (ref 0–450)
PLATELET # BLD AUTO: 223 K/UL — SIGNIFICANT CHANGE UP (ref 150–400)
POTASSIUM SERPL-MCNC: 3.9 MMOL/L — SIGNIFICANT CHANGE UP (ref 3.5–5.3)
POTASSIUM SERPL-SCNC: 3.9 MMOL/L — SIGNIFICANT CHANGE UP (ref 3.5–5.3)
PROT SERPL-MCNC: 7.3 GM/DL — SIGNIFICANT CHANGE UP (ref 6–8.3)
RBC # BLD: 3.72 M/UL — LOW (ref 4.2–5.8)
RBC # FLD: 17 % — HIGH (ref 10.3–14.5)
RSV RNA NPH QL NAA+NON-PROBE: SIGNIFICANT CHANGE UP
SARS-COV-2 RNA SPEC QL NAA+PROBE: SIGNIFICANT CHANGE UP
SODIUM SERPL-SCNC: 136 MMOL/L — SIGNIFICANT CHANGE UP (ref 135–145)
TROPONIN I, HIGH SENSITIVITY RESULT: 158.73 NG/L — HIGH
WBC # BLD: 6.77 K/UL — SIGNIFICANT CHANGE UP (ref 3.8–10.5)
WBC # FLD AUTO: 6.77 K/UL — SIGNIFICANT CHANGE UP (ref 3.8–10.5)

## 2023-02-12 PROCEDURE — 93010 ELECTROCARDIOGRAM REPORT: CPT

## 2023-02-12 PROCEDURE — 93306 TTE W/DOPPLER COMPLETE: CPT

## 2023-02-12 PROCEDURE — 99223 1ST HOSP IP/OBS HIGH 75: CPT

## 2023-02-12 PROCEDURE — A9500: CPT

## 2023-02-12 PROCEDURE — 85027 COMPLETE CBC AUTOMATED: CPT

## 2023-02-12 PROCEDURE — 97163 PT EVAL HIGH COMPLEX 45 MIN: CPT | Mod: GP

## 2023-02-12 PROCEDURE — 80048 BASIC METABOLIC PNL TOTAL CA: CPT

## 2023-02-12 PROCEDURE — 97116 GAIT TRAINING THERAPY: CPT | Mod: GP

## 2023-02-12 PROCEDURE — 36415 COLL VENOUS BLD VENIPUNCTURE: CPT

## 2023-02-12 PROCEDURE — 78452 HT MUSCLE IMAGE SPECT MULT: CPT

## 2023-02-12 PROCEDURE — 71045 X-RAY EXAM CHEST 1 VIEW: CPT | Mod: 26

## 2023-02-12 PROCEDURE — 84484 ASSAY OF TROPONIN QUANT: CPT

## 2023-02-12 PROCEDURE — 93017 CV STRESS TEST TRACING ONLY: CPT

## 2023-02-12 PROCEDURE — 99285 EMERGENCY DEPT VISIT HI MDM: CPT

## 2023-02-12 RX ORDER — SODIUM CHLORIDE 9 MG/ML
500 INJECTION INTRAMUSCULAR; INTRAVENOUS; SUBCUTANEOUS ONCE
Refills: 0 | Status: COMPLETED | OUTPATIENT
Start: 2023-02-12 | End: 2023-02-12

## 2023-02-12 RX ORDER — ACETAMINOPHEN 500 MG
650 TABLET ORAL EVERY 6 HOURS
Refills: 0 | Status: DISCONTINUED | OUTPATIENT
Start: 2023-02-12 | End: 2023-02-15

## 2023-02-12 RX ORDER — FINASTERIDE 5 MG/1
5 TABLET, FILM COATED ORAL DAILY
Refills: 0 | Status: DISCONTINUED | OUTPATIENT
Start: 2023-02-12 | End: 2023-02-15

## 2023-02-12 RX ORDER — ATORVASTATIN CALCIUM 80 MG/1
20 TABLET, FILM COATED ORAL AT BEDTIME
Refills: 0 | Status: DISCONTINUED | OUTPATIENT
Start: 2023-02-12 | End: 2023-02-15

## 2023-02-12 RX ORDER — ATENOLOL 25 MG/1
100 TABLET ORAL DAILY
Refills: 0 | Status: DISCONTINUED | OUTPATIENT
Start: 2023-02-12 | End: 2023-02-14

## 2023-02-12 RX ORDER — ONDANSETRON 8 MG/1
4 TABLET, FILM COATED ORAL EVERY 8 HOURS
Refills: 0 | Status: DISCONTINUED | OUTPATIENT
Start: 2023-02-12 | End: 2023-02-15

## 2023-02-12 RX ORDER — LEVOTHYROXINE SODIUM 125 MCG
150 TABLET ORAL DAILY
Refills: 0 | Status: DISCONTINUED | OUTPATIENT
Start: 2023-02-12 | End: 2023-02-15

## 2023-02-12 RX ORDER — PANTOPRAZOLE SODIUM 20 MG/1
40 TABLET, DELAYED RELEASE ORAL ONCE
Refills: 0 | Status: COMPLETED | OUTPATIENT
Start: 2023-02-12 | End: 2023-02-12

## 2023-02-12 RX ORDER — LANOLIN ALCOHOL/MO/W.PET/CERES
3 CREAM (GRAM) TOPICAL AT BEDTIME
Refills: 0 | Status: DISCONTINUED | OUTPATIENT
Start: 2023-02-12 | End: 2023-02-15

## 2023-02-12 RX ORDER — CHOLECALCIFEROL (VITAMIN D3) 125 MCG
1000 CAPSULE ORAL DAILY
Refills: 0 | Status: DISCONTINUED | OUTPATIENT
Start: 2023-02-12 | End: 2023-02-15

## 2023-02-12 RX ORDER — FUROSEMIDE 40 MG
40 TABLET ORAL DAILY
Refills: 0 | Status: DISCONTINUED | OUTPATIENT
Start: 2023-02-12 | End: 2023-02-15

## 2023-02-12 RX ADMIN — PANTOPRAZOLE SODIUM 40 MILLIGRAM(S): 20 TABLET, DELAYED RELEASE ORAL at 12:44

## 2023-02-12 RX ADMIN — FINASTERIDE 5 MILLIGRAM(S): 5 TABLET, FILM COATED ORAL at 17:51

## 2023-02-12 RX ADMIN — Medication 1000 UNIT(S): at 17:49

## 2023-02-12 RX ADMIN — SODIUM CHLORIDE 500 MILLILITER(S): 9 INJECTION INTRAMUSCULAR; INTRAVENOUS; SUBCUTANEOUS at 13:44

## 2023-02-12 RX ADMIN — SODIUM CHLORIDE 250 MILLILITER(S): 9 INJECTION INTRAMUSCULAR; INTRAVENOUS; SUBCUTANEOUS at 12:44

## 2023-02-12 RX ADMIN — ATORVASTATIN CALCIUM 20 MILLIGRAM(S): 80 TABLET, FILM COATED ORAL at 22:00

## 2023-02-12 NOTE — ED ADULT TRIAGE NOTE - CHIEF COMPLAINT QUOTE
intermittent black, tarry stools associated with epistaxis x the past 5 days. pt is on anticoagulant, rivaroxaban. pt appreciates no complaints of pain at this time, denies dizziness/weakness/SOB. pmhx MI, HTN, a fib, HLD, x3 csrdiac stents, epistaxis.

## 2023-02-12 NOTE — ED PROVIDER NOTE - NS ED ROS FT
Gen: No fever, normal appetite  Eyes: No eye irritation or discharge  ENT:  + epistaxis   Resp: No cough or trouble breathing  Cardiovascular: No chest pain or palpitation  Gastroenteric: black stools   :  No change in urine output; no dysuria  MS: No joint or muscle pain  Skin: No rashes  Neuro: No headache; no abnormal movements  Remainder negative, except as per the HPI

## 2023-02-12 NOTE — PHARMACOTHERAPY INTERVENTION NOTE - COMMENTS
Medication history complete, reviewed medications with patient son at bedside and confirmed with doctor first med hx.

## 2023-02-12 NOTE — H&P ADULT - NSHPLABSRESULTS_GEN_ALL_CORE
11.0   6.77  )-----------( 223      ( 12 Feb 2023 11:44 )             34.4       02-12    136  |  102  |  56<H>  ----------------------------<  106<H>  3.9   |  29  |  1.74<H>    Ca    9.0      12 Feb 2023 11:44    TPro  7.3  /  Alb  3.3  /  TBili  0.9  /  DBili  x   /  AST  20  /  ALT  12  /  AlkPhos  80  02-12    EKG Atrial fib. 11.0   6.77  )-----------( 223      ( 12 Feb 2023 11:44 )             34.4       02-12    136  |  102  |  56<H>  ----------------------------<  106<H>  3.9   |  29  |  1.74<H>    Ca    9.0      12 Feb 2023 11:44    TPro  7.3  /  Alb  3.3  /  TBili  0.9  /  DBili  x   /  AST  20  /  ALT  12  /  AlkPhos  80  02-12    EKG Atrial fib.    [place]: Trop. 158,  [id number],  Cr. 1.74, RVP neg.

## 2023-02-12 NOTE — ED PROVIDER NOTE - PHYSICAL EXAMINATION
GEN - NAD; well appearing; A+O x3   HEAD - NC/AT     EYES - EOMI, no conjunctival pallor, no scleral icterus  ENT -   mucous membranes  moist , no discharge      NECK - Neck supple  PULM - CTA b/l,  symmetric breath sounds  COR -  RRR, S1 S2, no murmurs  ABD - , ND, NT, soft, no guarding, no rebound, no masses , black tarry stool in rectal vault   BACK - no CVA tenderness, nontender spine     EXTREMS - 1+ edema, no deformity, warm and well perfused    SKIN - no rash or bruising      NEUROLOGIC - alert, sensation nl, motor 5/5 RUE/LUE/RLE/LLE

## 2023-02-12 NOTE — H&P ADULT - NSHPREVIEWOFSYSTEMS_GEN_ALL_CORE
ROS:  as in HPI.   Eyes: no changes in vision  ENT/Mouth: no changes    Cardiovascular: no chest pain    Respiratory: no SOB at rest    Gastrointestinal: black tarry stool,     Genitourinary: no dysuria    Breast: no pain    Musculoskeletal: no pain    Integumentary: no itching    Neurological: No Headache, no tremor,    Endocrine: no excessive thirst,     Allergic/Immunologic: no itching

## 2023-02-12 NOTE — H&P ADULT - HISTORY OF PRESENT ILLNESS
HPI: The patient is an 88 yo male with Hx. of Atrial fib. on Xarelto, Multiple episodes of epistaxis, CHF,CAD s/p stents who presented  in the ED for  black stool. His symptoms started 2 days ago and got worse last night. He also had nose bleed from R nostril but it stopped. He had multiple ER visits for epistaxis in the past, had ENT f/u and cauterization done by Dr. Jefferson.  The patient had COVID 19 infection,   CHF exacerbation last December.     PMHx: Atrial fib. on Xarelto, CADs/p stent, CHF,  CKD, renal tumor,     PSHx: PCI stents    Family Hx: Mother  from old age at 93, , father Hx. is unknown.    Social Hx.: not smoking, no alcohol use             HPI: The patient is an 88 yo male with Hx. of Atrial fib. on Xarelto, Multiple episodes of epistaxis, CHF,CAD s/p stents who presented  in the ED for  black stool. His symptoms started 2 days ago and got worse last night. He also had nose bleed from R nostril but it stopped. He had multiple ER visits for epistaxis in the past, had ENT f/u and cauterization done by Dr. Jefferson.  The patient had COVID 19 infection,   CHF exacerbation last December.     PMHx: Atrial fib. on Xarelto, CADs/p stent, CHF,  CKD, renal tumor, BPH, Hypothyroidism.     PSHx: PCI stents    Family Hx: Mother  from old age at 93, , father Hx. is unknown.    Social Hx.: not smoking, no alcohol use             HPI: The patient is an 86 yo male with Hx. of Atrial fib. on Xarelto, Multiple episodes of epistaxis, CHF,CAD s/p stents who presented  in the ED for  black stool. His symptoms started 2 days ago and got worse last night. He also had nose bleed from R nostril but it stopped. He had multiple ER visits for epistaxis in the past, had ENT f/u and cauterization done by Dr. Jefferson.  The patient had COVID 19 infection,   CHF exacerbation last December.     PMHx: Atrial fib. on Xarelto, CADs/p stent, CHF,  CKD, renal tumor, BPH, Hypothyroidism.     PSHx: PCI stents, Cholecystectomy, pancreatic cyst biopsy, renal mass,    Family Hx: Mother  from old age at 93, , father Hx. is unknown.    Social Hx.: not smoking, no alcohol use

## 2023-02-12 NOTE — CONSULT NOTE ADULT - PROBLEM SELECTOR RECOMMENDATION 9
-minimal elevation ECG unremarkable no chest pain  -likely demand ischemia not ACS    -Trend trops monitor on tele.

## 2023-02-12 NOTE — H&P ADULT - ASSESSMENT
86 yo male with Hx. of Atrial fib. on Xarelto, Multiple episodes of epistaxis, CHF,CAD s/p stents who presented  in the ED for  black stool. His symptoms started 2 days ago and got worse last night. He also had nose bleed from R nostril but it stopped. He had multiple ER visits for epistaxis in the past, had ENT f/u and cauterization done by Dr. Jefferson.  The patient had COVID 19 infection,   CHF exacerbation last December.     assessment Dx:                       1. Melena acute                        2. GI bleed acute                        3. Epistaxis  not present on admission                       4. Elevated troponin                        5. CAD                       6. CHF                        7.  Anemia from acute blood loss    Plan:    admit to medicine                medications: Hold asa, Xarelto, continue current medications as per med rec.               VTEP: venodyne               Labs: cbc,bmp               Radiology: CXRay               Cardiac diagnostics: EKG               Consults: Cardiology, GI, Nephrology                 Advance Directive: full code,                                      PAST MEDICAL HISTORY:  No pertinent past medical history

## 2023-02-12 NOTE — ED PROVIDER NOTE - CARE PLAN
Principal Discharge DX:	GI bleed  Secondary Diagnosis:	Elevated troponin  Secondary Diagnosis:	CHF exacerbation   1

## 2023-02-12 NOTE — CONSULT NOTE ADULT - PROBLEM SELECTOR RECOMMENDATION 2
-elevated BNP but no dyspnea, no signs of heart failure.  -CXR possible congestion.    -monitor volume status no need for diuresis at present.  -echo ordered.

## 2023-02-12 NOTE — H&P ADULT - NSHPPHYSICALEXAM_GEN_ALL_CORE
Physical Exam: Vital Signs Last 24 Hrs  T(C): 36.6 (12 Feb 2023 15:57), Max: 37.1 (12 Feb 2023 11:22)  T(F): 97.9 (12 Feb 2023 15:57), Max: 98.7 (12 Feb 2023 11:22)  HR: 65 (12 Feb 2023 15:57) (65 - 107)  BP: 104/65 (12 Feb 2023 15:57) (101/65 - 113/70)  BP(mean): 77 (12 Feb 2023 14:56) (77 - 83)  RR: 18 (12 Feb 2023 15:57) (18 - 19)  SpO2: 99% (12 Feb 2023 15:57) (93% - 99%)    Parameters below as of 12 Feb 2023 15:57  Patient On (Oxygen Delivery Method): room air            HEENT: PRRL EOMI, Dry blood in  R nostril     MOUTH/TEETH/GUMS: Clear    NECK: no JVD    LUNGS: Clear    HEART: S1,S2 RR    ABDOMEN: soft nontender, BS present ,  stool heme positive.     EXTREMITIES:  trace  pedal edema    MUSCULOSKELETAL: no joint swelling     NEURO: no tremor, no focal signs.    SKIN: no rash    : CVA negative,

## 2023-02-12 NOTE — ED PROVIDER NOTE - OBJECTIVE STATEMENT
86yo m pmh afib on xarelto, CHF, CKD, renal tumor, presents w/ black stools. family also reports pt was recently stopped on xarelto for 3 days after r. naris epistaxis which has since resolved, xarelto was restarted last night. family also notes pt slightly more confused than baseline. per family pt was totally independent until december when patient had covid and since has had CHF exacerbation as well as worsening renal function.

## 2023-02-12 NOTE — PATIENT PROFILE ADULT - FALL HARM RISK - HARM RISK INTERVENTIONS

## 2023-02-12 NOTE — ED ADULT NURSE NOTE - OBJECTIVE STATEMENT
pt is a 87 YOM complaining of bloody stools and epistaxis. Pt reports "small black poop for the past couple of days". Pt has a history of afib. Pt denies chest pain or SOB. Pt is GCS 15, HEENT clear, PERRL, PMS x4. Pt. skin is warm dry and appropriate for race. Pt. denies taking morning medication. Pt. abdomin is soft nontender. Pt denies nausea or vomiting. 20 g IV inserted R AC. Pt safety is maintained bed is in lowest position semi fowlers w bed rails up call bell within reach.

## 2023-02-12 NOTE — INPATIENT CERTIFICATION FOR MEDICARE PATIENTS - IN ORDER TO MEET MEDICARE REQUIREMENTS.
In order to meet Medicare requirements, the clinical documentation must support the information cited in the admission order.  Please be sure to provide detailed and clear documentation about the following in the admitting note/history and physical:
no

## 2023-02-13 LAB
ADD ON TEST-SPECIMEN IN LAB: SIGNIFICANT CHANGE UP
ANION GAP SERPL CALC-SCNC: 6 MMOL/L — SIGNIFICANT CHANGE UP (ref 5–17)
BUN SERPL-MCNC: 48 MG/DL — HIGH (ref 7–23)
CALCIUM SERPL-MCNC: 8.8 MG/DL — SIGNIFICANT CHANGE UP (ref 8.5–10.1)
CHLORIDE SERPL-SCNC: 105 MMOL/L — SIGNIFICANT CHANGE UP (ref 96–108)
CO2 SERPL-SCNC: 27 MMOL/L — SIGNIFICANT CHANGE UP (ref 22–31)
CREAT SERPL-MCNC: 1.43 MG/DL — HIGH (ref 0.5–1.3)
EGFR: 48 ML/MIN/1.73M2 — LOW
GLUCOSE SERPL-MCNC: 108 MG/DL — HIGH (ref 70–99)
HCT VFR BLD CALC: 33.8 % — LOW (ref 39–50)
HGB BLD-MCNC: 10.4 G/DL — LOW (ref 13–17)
MCHC RBC-ENTMCNC: 28.5 PG — SIGNIFICANT CHANGE UP (ref 27–34)
MCHC RBC-ENTMCNC: 30.8 GM/DL — LOW (ref 32–36)
MCV RBC AUTO: 92.6 FL — SIGNIFICANT CHANGE UP (ref 80–100)
PLATELET # BLD AUTO: 211 K/UL — SIGNIFICANT CHANGE UP (ref 150–400)
POTASSIUM SERPL-MCNC: 4 MMOL/L — SIGNIFICANT CHANGE UP (ref 3.5–5.3)
POTASSIUM SERPL-SCNC: 4 MMOL/L — SIGNIFICANT CHANGE UP (ref 3.5–5.3)
RBC # BLD: 3.65 M/UL — LOW (ref 4.2–5.8)
RBC # FLD: 17 % — HIGH (ref 10.3–14.5)
SODIUM SERPL-SCNC: 138 MMOL/L — SIGNIFICANT CHANGE UP (ref 135–145)
TROPONIN I, HIGH SENSITIVITY RESULT: 139.53 NG/L — HIGH
WBC # BLD: 6.86 K/UL — SIGNIFICANT CHANGE UP (ref 3.8–10.5)
WBC # FLD AUTO: 6.86 K/UL — SIGNIFICANT CHANGE UP (ref 3.8–10.5)

## 2023-02-13 PROCEDURE — 93306 TTE W/DOPPLER COMPLETE: CPT | Mod: 26

## 2023-02-13 PROCEDURE — 99222 1ST HOSP IP/OBS MODERATE 55: CPT

## 2023-02-13 PROCEDURE — 99233 SBSQ HOSP IP/OBS HIGH 50: CPT

## 2023-02-13 RX ORDER — RIVAROXABAN 15 MG-20MG
15 KIT ORAL
Refills: 0 | Status: DISCONTINUED | OUTPATIENT
Start: 2023-02-13 | End: 2023-02-15

## 2023-02-13 RX ADMIN — Medication 1000 UNIT(S): at 09:51

## 2023-02-13 RX ADMIN — Medication 150 MICROGRAM(S): at 06:07

## 2023-02-13 RX ADMIN — Medication 40 MILLIGRAM(S): at 09:51

## 2023-02-13 RX ADMIN — FINASTERIDE 5 MILLIGRAM(S): 5 TABLET, FILM COATED ORAL at 09:51

## 2023-02-13 RX ADMIN — ATORVASTATIN CALCIUM 20 MILLIGRAM(S): 80 TABLET, FILM COATED ORAL at 21:31

## 2023-02-13 RX ADMIN — Medication 0.25 MILLIGRAM(S): at 21:32

## 2023-02-13 RX ADMIN — ATENOLOL 100 MILLIGRAM(S): 25 TABLET ORAL at 09:51

## 2023-02-13 RX ADMIN — RIVAROXABAN 15 MILLIGRAM(S): KIT at 17:25

## 2023-02-13 NOTE — PROGRESS NOTE ADULT - SUBJECTIVE AND OBJECTIVE BOX
HPI: The patient is an 86 yo male with Hx. of Atrial fib. on Xarelto, Multiple episodes of epistaxis, CHF,CAD s/p stents who presented  in the ED for  black stool. His symptoms started 2 days ago and got worse last night. He also had nose bleed from R nostril but it stopped. He had multiple ER visits for epistaxis in the past, had ENT f/u and cauterization done by Dr. Jefferson.  The patient had COVID 19 infection,   CHF exacerbation last December.   Cardiology consulted for CHF and elevated trops. Pt. admitted for GI bleed. Also w/ trop elevation, hospitalist suspects CHF. Pt denies chest pain, dyspnea, LE edema. No cardiac symptoms.    2//13/23: no complaints, Tele: Afluttter 70s, 40s when sleeping, Echo pending       MEDICATIONS:  OUTPATIENT:  Home Medications:  Aspir 81 oral delayed release tablet: 1 tab(s) orally once a day (12 Feb 2023 16:44)  atenolol 100 mg oral tablet: 1 tab(s) orally once a day (12 Feb 2023 16:44)  finasteride 5 mg oral tablet: 1 tab(s) orally once a day (12 Feb 2023 16:44)  furosemide 40 mg oral tablet: 1 tab(s) orally once a day (12 Feb 2023 16:44)  levothyroxine 150 mcg (0.15 mg) oral tablet: 1 tab(s) orally once a day (12 Feb 2023 16:44)  pravastatin 40 mg oral tablet: 1 tab(s) orally once a day (12 Feb 2023 16:44)  Vitamin D3 25 mcg (1000 intl units) oral tablet: 1 tab(s) orally once a day (12 Feb 2023 16:44)  Xarelto 15 mg oral tablet: 1 tab(s) orally once a day (in the evening) (12 Feb 2023 16:44)    MEDICATIONS  (STANDING):  atenolol  Tablet 100 milliGRAM(s) Oral daily  atorvastatin 20 milliGRAM(s) Oral at bedtime  cholecalciferol 1000 Unit(s) Oral daily  finasteride 5 milliGRAM(s) Oral daily  furosemide    Tablet 40 milliGRAM(s) Oral daily  levothyroxine 150 MICROGram(s) Oral daily  rivaroxaban 15 milliGRAM(s) Oral with dinner      MEDICATIONS  (PRN):  acetaminophen     Tablet .. 650 milliGRAM(s) Oral every 6 hours PRN Temp greater or equal to 38C (100.4F), Mild Pain (1 - 3)  aluminum hydroxide/magnesium hydroxide/simethicone Suspension 30 milliLiter(s) Oral every 4 hours PRN Dyspepsia  melatonin 3 milliGRAM(s) Oral at bedtime PRN Insomnia  ondansetron Injectable 4 milliGRAM(s) IV Push every 8 hours PRN Nausea and/or Vomiting    MEDICATIONS  (PRN):  acetaminophen     Tablet .. 650 milliGRAM(s) Oral every 6 hours PRN Temp greater or equal to 38C (100.4F), Mild Pain (1 - 3)  aluminum hydroxide/magnesium hydroxide/simethicone Suspension 30 milliLiter(s) Oral every 4 hours PRN Dyspepsia  melatonin 3 milliGRAM(s) Oral at bedtime PRN Insomnia  ondansetron Injectable 4 milliGRAM(s) IV Push every 8 hours PRN Nausea and/or Vomiting      Vital Signs Last 24 Hrs  T(C): 36.7 (13 Feb 2023 09:47), Max: 36.7 (13 Feb 2023 09:47)  T(F): 98 (13 Feb 2023 09:47), Max: 98 (13 Feb 2023 09:47)  HR: 73 (13 Feb 2023 09:47) (65 - 73)  BP: 108/65 (13 Feb 2023 09:47) (101/65 - 108/65)  BP(mean): 77 (12 Feb 2023 14:56) (77 - 77)  RR: 18 (13 Feb 2023 09:47) (18 - 19)  SpO2: 94% (13 Feb 2023 09:47) (94% - 100%)    Parameters below as of 13 Feb 2023 09:47  Patient On (Oxygen Delivery Method): room air      PHYSICAL EXAM:    Constitutional: NAD, awake and alert,   HEENT: PERR, EOMI,  No oral cyananosis.  Neck:  supple,  No JVD  Respiratory: Breath sounds are clear bilaterally, No wheezing, rales or rhonchi  Cardiovascular: S1 and S2, regular rate and rhythm, no Murmurs, gallops or rubs  Gastrointestinal: Bowel Sounds present, soft, nontender.   Extremities: No peripheral edema. No clubbing or cyanosis.  Vascular: 2+ peripheral pulses  Neurological: A/O x 3, no focal deficits  Musculoskeletal: no calf tenderness.  Skin: No rashes.    ===============================  ===============================  LABS:                         10.4   6.86  )-----------( 211      ( 13 Feb 2023 06:33 )             33.8     02-13    138  |  105  |  48<H>  ----------------------------<  108<H>  4.0   |  27  |  1.43<H>    Ca    8.8      13 Feb 2023 06:33    TPro  7.3  /  Alb  3.3  /  TBili  0.9  /  DBili  x   /  AST  20  /  ALT  12  /  AlkPhos  80  02-12    - TroponinI hsT: <-139.53, <-158.73                        11.0   6.77  )-----------( 223      ( 12 Feb 2023 11:44 )             34.4     12 Feb 2023 11:44    136    |  102    |  56     ----------------------------<  106    3.9     |  29     |  1.74     Ca    9.0        12 Feb 2023 11:44    TPro  7.3    /  Alb  3.3    /  TBili  0.9    /  DBili  x      /  AST  20     /  ALT  12     /  AlkPhos  80     12 Feb 2023 11:44        THYROID STUDIES:    ===============================  ===============================  CARDIAC BIOMARKERS:  -------  -BNP VALUES:  02-12 @ 11:44  Pro Bnp [id number]    -------  -TROPONIN VALUES:   Troponin I, High Sensitivity Result: 158.73 ng/L *H* (02-12-23 @ 11:44)    ===============================  ===============================  EKG:  extensive artifact  possible accelerated junctional v. sinus  no ischemic changes. PRWP transitions V6, Qs III, aVF  ===============================  RADIOLOGY:  CXR prelim. - possible mild pul vascular congestion.  ===============================    Dev Rice M.D.  Cardiology, [facility]  Cell: [phone]  Office:   [phone] ([facility] Office)  [phone] ([facility] Office)    ===============================

## 2023-02-13 NOTE — PROGRESS NOTE ADULT - ASSESSMENT
86 y/o male with Hx. of Atrial fib (On Xarelto), Multiple episodes of epistaxis, CHF, CAD s/p stents who presented in the ED for black stool. His symptoms started 2 days ago and got worse last night. He also had nose bleed from R nostril but it stopped. He had multiple ER visits for epistaxis in the past, had ENT f/u and cauterization done by Dr. Jefferson.  The patient had COVID19 infection/CHF exacerbation last December.     Melena   Unlikely GI bleed, GI eval appreciated, suspected due to epistaxis /swallowing blood  - No further workup at this time  - H&H stable  - ASA and Xarelto initially held, d/w GI/Cardio resume Xarelto only and monitor  - GI f/u appreciated    Elevated troponin   Hx CAD s/p stents  Likely demand ischemia, unlikely ACS  - Monitor on tele, noted with afib/flutter  - TroponinI hsT: <-139.53, <-158.73 (downtrended)  - Echo pending    YUE  Unknown Baseline, sCr improved  - s/p IVF, monitor  - Nephrology consulted    Mild Anemia  Likely from acute blood loss/ epistaxis, unlikely GI bleed  - Monitor H&H, resuming Xarelto this evening    hx CHF  Unlikely HF exacerbation  - CXR, possible congestion, awaiting read  - Echo pending  - No IV diuresis at this time, c/w PO  - Cardio f/u appreciated     Permanent Afib/flutter  - Resume Xarelto 15mg dinner  - Continue Atenolol    Full Code.    PT eval pending     Updated Nathanael  Medically acute, c/w inpatient management.

## 2023-02-13 NOTE — PROGRESS NOTE ADULT - SUBJECTIVE AND OBJECTIVE BOX
Chief Complaint: GI bleed, CHF    Interval Events/ROS/Subjective:  2/13/23: Patient seen and examined. No cp or dyspnea. Feeling well.  Resuming Xarelto, monitor H&H. PT destiney, case d/w GI and cardio    All other review of systems is negative unless indicated above    Physical Exam:  T(C): 36.7 (13 Feb 2023 09:47), Max: 36.7 (13 Feb 2023 09:47)  T(F): 98 (13 Feb 2023 09:47), Max: 98 (13 Feb 2023 09:47)  HR: 73 (13 Feb 2023 09:47) (65 - 73)  BP: 108/65 (13 Feb 2023 09:47) (101/65 - 108/65)  BP(mean): 77 (12 Feb 2023 14:56) (77 - 77)  RR: 18 (13 Feb 2023 09:47) (18 - 19)  SpO2: 94% (13 Feb 2023 09:47) (94% - 100%)    Parameters below as of 13 Feb 2023 09:47  Patient On (Oxygen Delivery Method): room air    Constitutional: Awake and alert  HEENT: Normal Hearing, MMM  Neck: Soft and supple, No LAD, No JVD  Respiratory: Breath sounds are clear bilaterally, No wheezing, rales or rhonchi  Cardiovascular: S1 and S2, regular rate and rhythm, no Murmurs, gallops or rubs  Gastrointestinal: Bowel Sounds present, soft, nontender, nondistended, no guarding, no rebound  Extremities: No peripheral edema  Vascular: 2+ peripheral pulses  Neurological: A/O x 3, no focal deficits  Musculoskeletal: 5/5 strength b/l upper and lower extremities  Skin: No rashes    Labs:                        10.4   6.86  )-----------( 211      ( 13 Feb 2023 06:33 )             33.8     02-13    138  |  105  |  48<H>  ----------------------------<  108<H>  4.0   |  27  |  1.43<H>    Ca    8.8      13 Feb 2023 06:33    TPro  7.3  /  Alb  3.3  /  TBili  0.9  /  DBili  x   /  AST  20  /  ALT  12  /  AlkPhos  80  02-12    BNP 65717    Micro:    2/12/23: COVID19 (-)    Radiology:    2/12/23: CXR pending read, possible vascular congestion    Cardiac Testing:    TroponinI hsT: <-139.53, <-158.73    Echo pending    Medications:  atenolol  Tablet 100 milliGRAM(s) Oral daily  atorvastatin 20 milliGRAM(s) Oral at bedtime  cholecalciferol 1000 Unit(s) Oral daily  finasteride 5 milliGRAM(s) Oral daily  furosemide    Tablet 40 milliGRAM(s) Oral daily  levothyroxine 150 MICROGram(s) Oral daily    MEDICATIONS  (PRN):  acetaminophen     Tablet .. 650 milliGRAM(s) Oral every 6 hours PRN Temp greater or equal to 38C (100.4F), Mild Pain (1 - 3)  aluminum hydroxide/magnesium hydroxide/simethicone Suspension 30 milliLiter(s) Oral every 4 hours PRN Dyspepsia  melatonin 3 milliGRAM(s) Oral at bedtime PRN Insomnia  ondansetron Injectable 4 milliGRAM(s) IV Push every 8 hours PRN Nausea and/or Vomiting    Home Medications:  Aspir 81 oral delayed release tablet: 1 tab(s) orally once a day (12 Feb 2023 16:44)  atenolol 100 mg oral tablet: 1 tab(s) orally once a day (12 Feb 2023 16:44)  finasteride 5 mg oral tablet: 1 tab(s) orally once a day (12 Feb 2023 16:44)  furosemide 40 mg oral tablet: 1 tab(s) orally once a day (12 Feb 2023 16:44)  levothyroxine 150 mcg (0.15 mg) oral tablet: 1 tab(s) orally once a day (12 Feb 2023 16:44)  pravastatin 40 mg oral tablet: 1 tab(s) orally once a day (12 Feb 2023 16:44)  Vitamin D3 25 mcg (1000 intl units) oral tablet: 1 tab(s) orally once a day (12 Feb 2023 16:44)  Xarelto 15 mg oral tablet: 1 tab(s) orally once a day (in the evening) (12 Feb 2023 16:44)

## 2023-02-13 NOTE — PHYSICAL THERAPY INITIAL EVALUATION ADULT - PERTINENT HX OF CURRENT PROBLEM, REHAB EVAL
Pt admitted to  secondary to black stool and epistaxis. Pt with multiple ER visits in the past for epistaxis. Troponins- 2/12- 158.73, 2/13- 139.53.

## 2023-02-13 NOTE — PHYSICAL THERAPY INITIAL EVALUATION ADULT - PLANNED THERAPY INTERVENTIONS, PT EVAL
Eval, amb, transfers, bed mobility x 15'. Pt left in bed with all observation section equipment/material intact and bed alarm activated. Pt with no c/o pain. Will cont to follow pt and increase as tolerated./bed mobility training/gait training/transfer training

## 2023-02-13 NOTE — PHYSICAL THERAPY INITIAL EVALUATION ADULT - ACTIVE RANGE OF MOTION EXAMINATION, REHAB EVAL
Bilateral shoulder flex ~ 140 degrees./Left UE Active ROM was WFL (within functional limits)/Right UE Active ROM was WFL (within functional limits)/Left LE Active ROM was WFL (within functional limits)/Right LE Active ROM was WFL (within functional limits)

## 2023-02-13 NOTE — CONSULT NOTE ADULT - SUBJECTIVE AND OBJECTIVE BOX
Patient is a 87y old  Male who presents with a chief complaint of GI bleed, CHF, (2023 18:42)      HPI:  HPI: The patient is an 88 yo male with Hx. of Atrial fib. on Xarelto, Multiple episodes of epistaxis, CHF,CAD s/p stents who presented  in the ED for  black stool. His symptoms started 2 days ago and got worse last night. He also had nose bleed from R nostril but it stopped. He had multiple ER visits for epistaxis in the past, had ENT f/u and cauterization done by Dr. Jefferson.  The patient had COVID 19 infection,   CHF exacerbation last December.     PMHx: Atrial fib. on Xarelto, CADs/p stent, CHF,  CKD, renal tumor, BPH, Hypothyroidism.     PSHx: PCI stents, Cholecystectomy, pancreatic cyst biopsy, renal mass,    Family Hx: Mother  from old age at 93, , father Hx. is unknown.    Social Hx.: not smoking, no alcohol use             (2023 15:48)      PAST MEDICAL & SURGICAL HISTORY:  CAD (coronary artery disease)      S/P coronary artery stent placement      Afib          MEDICATIONS  (STANDING):  atenolol  Tablet 100 milliGRAM(s) Oral daily  atorvastatin 20 milliGRAM(s) Oral at bedtime  cholecalciferol 1000 Unit(s) Oral daily  finasteride 5 milliGRAM(s) Oral daily  furosemide    Tablet 40 milliGRAM(s) Oral daily  levothyroxine 150 MICROGram(s) Oral daily    MEDICATIONS  (PRN):  acetaminophen     Tablet .. 650 milliGRAM(s) Oral every 6 hours PRN Temp greater or equal to 38C (100.4F), Mild Pain (1 - 3)  aluminum hydroxide/magnesium hydroxide/simethicone Suspension 30 milliLiter(s) Oral every 4 hours PRN Dyspepsia  melatonin 3 milliGRAM(s) Oral at bedtime PRN Insomnia  ondansetron Injectable 4 milliGRAM(s) IV Push every 8 hours PRN Nausea and/or Vomiting      Allergies    No Known Allergies    Intolerances        SOCIAL HISTORY:    FAMILY HISTORY:      REVIEW OF SYSTEMS:    CONSTITUTIONAL: No weakness, fevers or chills  EYES/ENT: No visual changes;  No vertigo or throat pain   NECK: No pain or stiffness  RESPIRATORY: No cough, wheezing, hemoptysis; No shortness of breath  CARDIOVASCULAR: No chest pain or palpitations  GASTROINTESTINAL: No abdominal or epigastric pain. No nausea, vomiting, or hematemesis; No diarrhea or constipation. No melena or hematochezia.  GENITOURINARY: No dysuria, frequency or hematuria  NEUROLOGICAL: No numbness or weakness  SKIN: No itching, burning, rashes, or lesions   PSYCH: Normal mood and affect  All other review of systems is negative unless indicated above.    Vital Signs Last 24 Hrs  T(C): 36.3 (2023 20:56), Max: 37.1 (2023 11:22)  T(F): 97.3 (2023 20:56), Max: 98.7 (2023 11:22)  HR: 66 (2023 20:56) (65 - 107)  BP: 102/65 (2023 20:56) (101/65 - 113/70)  BP(mean): 77 (2023 14:56) (77 - 83)  RR: 18 (2023 20:56) (18 - 19)  SpO2: 100% (2023 20:56) (93% - 100%)    Parameters below as of 2023 20:56  Patient On (Oxygen Delivery Method): room air        PHYSICAL EXAM:    Constitutional: No acute distress, well-developed, non-toxic appearing  HEENT: masked, good phonation, not icteric  Neck: supple, no lymphadenopathy  Respiratory: clear to ascultation bilaterally, no wheezing  Cardiovascular: S1 and S2, regular rate and rhythm, no murmurs rubs or gallops  Gastrointestinal: soft, non-tender, non-distended, +bowel sounds, no rebound or guarding, no surgical scars, no drains  Extremities: No peripheral edema, no cyanosis or clubbing  Vascular: 2+ peripheral pulses, no venous stasis  Neurological: A/O x 3, no focal deficits, no asterixis  Psychiatric: Normal mood, normal affect  Skin: No rashes, not jaundiced    LABS:                        10.4   6.86  )-----------( 211      ( 2023 06:33 )             33.8     02-13    138  |  105  |  48<H>  ----------------------------<  108<H>  4.0   |  27  |  1.43<H>    Ca    8.8      2023 06:33    TPro  7.3  /  Alb  3.3  /  TBili  0.9  /  DBili  x   /  AST  20  /  ALT  12  /  AlkPhos  80  02-12      LIVER FUNCTIONS - ( 2023 11:44 )  Alb: 3.3 g/dL / Pro: 7.3 gm/dL / ALK PHOS: 80 U/L / ALT: 12 U/L / AST: 20 U/L / GGT: x             RADIOLOGY & ADDITIONAL STUDIES: Patient is a 87y old  Male who presents with a chief complaint of GI bleed, CHF, (12 Feb 2023 18:42)    87 year old man with afib on xarelto, CHF, CAD with stents, prior epistaxis with ENT cauterization, admitted for severe epistaxis. Consult called for melena.     Patient states he had several nose bleeds prior to admission. He denies any further bleeding from his nose overnight or this AM. Denies any abdominal pain. No post prandial symptoms. No hematochezia, no hematemesis. Did have dark tarry stool x 1. No coffee grind emesis. No nausea or vomiting. Good appetite. Tolerating meals. he now states he feels well. His biggest complaint is that he cannot easily eat his soup in the position he is in. Denies any dizziness or chest pain. No shortness of breath.     PAST MEDICAL & SURGICAL HISTORY:  CAD (coronary artery disease)      S/P coronary artery stent placement      Afib on xarelto    BPH      MEDICATIONS  (STANDING):  atenolol  Tablet 100 milliGRAM(s) Oral daily  atorvastatin 20 milliGRAM(s) Oral at bedtime  cholecalciferol 1000 Unit(s) Oral daily  finasteride 5 milliGRAM(s) Oral daily  furosemide    Tablet 40 milliGRAM(s) Oral daily  levothyroxine 150 MICROGram(s) Oral daily    MEDICATIONS  (PRN):  acetaminophen     Tablet .. 650 milliGRAM(s) Oral every 6 hours PRN Temp greater or equal to 38C (100.4F), Mild Pain (1 - 3)  aluminum hydroxide/magnesium hydroxide/simethicone Suspension 30 milliLiter(s) Oral every 4 hours PRN Dyspepsia  melatonin 3 milliGRAM(s) Oral at bedtime PRN Insomnia  ondansetron Injectable 4 milliGRAM(s) IV Push every 8 hours PRN Nausea and/or Vomiting      Allergies    No Known Allergies    Intolerances    SOCIAL HISTORY:  no smoking, no drinking, no drugs    FAMILY HISTORY:  no colon or stomach    REVIEW OF SYSTEMS:    CONSTITUTIONAL: No weakness, fevers or chills  EYES/ENT: No visual changes;  No vertigo or throat pain . +nose bleeds  NECK: No pain or stiffness  RESPIRATORY: No cough, wheezing, hemoptysis; No shortness of breath  CARDIOVASCULAR: No chest pain or palpitations  GASTROINTESTINAL: No abdominal or epigastric pain. No nausea, vomiting, or hematemesis; No diarrhea or constipation.  No hematochezia. +melena x 1  GENITOURINARY: No dysuria, frequency or hematuria  NEUROLOGICAL: No numbness or weakness  SKIN: No itching, burning, rashes, or lesions   PSYCH: Normal mood and affect  All other review of systems is negative unless indicated above.    Vital Signs Last 24 Hrs  T(C): 36.3 (12 Feb 2023 20:56), Max: 37.1 (12 Feb 2023 11:22)  T(F): 97.3 (12 Feb 2023 20:56), Max: 98.7 (12 Feb 2023 11:22)  HR: 66 (12 Feb 2023 20:56) (65 - 107)  BP: 102/65 (12 Feb 2023 20:56) (101/65 - 113/70)  BP(mean): 77 (12 Feb 2023 14:56) (77 - 83)  RR: 18 (12 Feb 2023 20:56) (18 - 19)  SpO2: 100% (12 Feb 2023 20:56) (93% - 100%)    Parameters below as of 12 Feb 2023 20:56  Patient On (Oxygen Delivery Method): room air        PHYSICAL EXAM:    Constitutional: No acute distress, elderly but appropriate for age, non-toxic appearing, eating food  HEENT: un-masked, good phonation, not icteric  Neck: supple, no lymphadenopathy  Respiratory: clear to ascultation bilaterally, no wheezing  Cardiovascular: S1 and S2, regular rate and rhythm, no murmurs rubs or gallops  Gastrointestinal: soft, non-tender, non-distended, +bowel sounds, no rebound or guarding,   Extremities: No peripheral edema, no cyanosis or clubbing  Vascular: 2+ peripheral pulses, no venous stasis  Neurological: A/O x 3, no focal deficits, no asterixis  Psychiatric: Normal mood, normal affect  Skin: No rashes, not jaundiced    LABS:                        10.4   6.86  )-----------( 211      ( 13 Feb 2023 06:33 )             33.8     02-13    138  |  105  |  48<H>  ----------------------------<  108<H>  4.0   |  27  |  1.43<H>    Ca    8.8      13 Feb 2023 06:33    TPro  7.3  /  Alb  3.3  /  TBili  0.9  /  DBili  x   /  AST  20  /  ALT  12  /  AlkPhos  80  02-12      LIVER FUNCTIONS - ( 12 Feb 2023 11:44 )  Alb: 3.3 g/dL / Pro: 7.3 gm/dL / ALK PHOS: 80 U/L / ALT: 12 U/L / AST: 20 U/L / GGT: x             RADIOLOGY & ADDITIONAL STUDIES:

## 2023-02-13 NOTE — CONSULT NOTE ADULT - SUBJECTIVE AND OBJECTIVE BOX
Patient is a 87y old  Male who presents with a chief complaint of GI bleed, CHF, (13 Feb 2023 14:23)    87 year old man with afib on xarelto, CHF, CAD with stents, prior epistaxis with ENT cauterization, admitted for severe epistaxis. Consult called for melena.     Patient states he had several nose bleeds prior to admission. He denies any further bleeding from his nose overnight or this AM. Denies any abdominal pain. No post prandial symptoms. No hematochezia, no hematemesis. Did had dark tarry stool x 1. No nausea or vomiting. Good appetite. Tolerating meals.         PAST MEDICAL & SURGICAL HISTORY:  CAD (coronary artery disease)      S/P coronary artery stent placement      Afib on xarelto    epistaxis requiring cauterization    BPH          MEDICATIONS  (STANDING):  atenolol  Tablet 100 milliGRAM(s) Oral daily  atorvastatin 20 milliGRAM(s) Oral at bedtime  cholecalciferol 1000 Unit(s) Oral daily  finasteride 5 milliGRAM(s) Oral daily  furosemide    Tablet 40 milliGRAM(s) Oral daily  levothyroxine 150 MICROGram(s) Oral daily  rivaroxaban 15 milliGRAM(s) Oral with dinner    MEDICATIONS  (PRN):  acetaminophen     Tablet .. 650 milliGRAM(s) Oral every 6 hours PRN Temp greater or equal to 38C (100.4F), Mild Pain (1 - 3)  aluminum hydroxide/magnesium hydroxide/simethicone Suspension 30 milliLiter(s) Oral every 4 hours PRN Dyspepsia  melatonin 3 milliGRAM(s) Oral at bedtime PRN Insomnia  ondansetron Injectable 4 milliGRAM(s) IV Push every 8 hours PRN Nausea and/or Vomiting      Allergies    No Known Allergies    Intolerances    SOCIAL HISTORY:  no smoking, no alcohol, no drugs    FAMILY HISTORY:  no colon or stomach cancer    REVIEW OF SYSTEMS:    CONSTITUTIONAL: No weakness, fevers or chills  EYES/ENT: per HPI  NECK: No pain or stiffness  RESPIRATORY: No cough, wheezing, hemoptysis; No shortness of breath  CARDIOVASCULAR: No chest pain or palpitations  GASTROINTESTINAL: No abdominal or epigastric pain. No nausea, vomiting, or hematemesis; No diarrhea or constipation. No melena or hematochezia.  GENITOURINARY: No dysuria, frequency or hematuria  NEUROLOGICAL: No numbness or weakness  SKIN: No itching, burning, rashes, or lesions   PSYCH: Normal mood and affect  All other review of systems is negative unless indicated above.    Vital Signs Last 24 Hrs  T(C): 36.7 (13 Feb 2023 09:47), Max: 36.7 (13 Feb 2023 09:47)  T(F): 98 (13 Feb 2023 09:47), Max: 98 (13 Feb 2023 09:47)  HR: 73 (13 Feb 2023 09:47) (65 - 73)  BP: 108/65 (13 Feb 2023 09:47) (101/65 - 108/65)  BP(mean): 77 (12 Feb 2023 14:56) (77 - 77)  RR: 18 (13 Feb 2023 09:47) (18 - 19)  SpO2: 94% (13 Feb 2023 09:47) (94% - 100%)    Parameters below as of 13 Feb 2023 09:47  Patient On (Oxygen Delivery Method): room air        PHYSICAL EXAM:    Constitutional: No acute distress, well-developed, non-toxic appearing  HEENT: masked, good phonation, not icteric  Neck: supple, no lymphadenopathy  Respiratory: clear to ascultation bilaterally, no wheezing  Cardiovascular: S1 and S2, regular rate and rhythm, no murmurs rubs or gallops  Gastrointestinal: soft, non-tender, non-distended, +bowel sounds, no rebound or guarding, no surgical scars, no drains  Extremities: No peripheral edema, no cyanosis or clubbing  Vascular: 2+ peripheral pulses, no venous stasis  Neurological: A/O x 3, no focal deficits, no asterixis  Psychiatric: Normal mood, normal affect  Skin: No rashes, not jaundiced    LABS:                        10.4   6.86  )-----------( 211      ( 13 Feb 2023 06:33 )             33.8     02-13    138  |  105  |  48<H>  ----------------------------<  108<H>  4.0   |  27  |  1.43<H>    Ca    8.8      13 Feb 2023 06:33    TPro  7.3  /  Alb  3.3  /  TBili  0.9  /  DBili  x   /  AST  20  /  ALT  12  /  AlkPhos  80  02-12      LIVER FUNCTIONS - ( 12 Feb 2023 11:44 )  Alb: 3.3 g/dL / Pro: 7.3 gm/dL / ALK PHOS: 80 U/L / ALT: 12 U/L / AST: 20 U/L / GGT: x             RADIOLOGY & ADDITIONAL STUDIES:

## 2023-02-13 NOTE — PHYSICAL THERAPY INITIAL EVALUATION ADULT - ASSISTIVE DEVICE FOR TRANSFER: GAIT, REHAB EVAL
SAC I have personally performed a face to face diagnostic evaluation on this patient. I have reviewed the ACP note and agree with the history, exam and plan of care, except as noted.

## 2023-02-13 NOTE — PROVIDER CONTACT NOTE (OTHER) - SITUATION
called ans service - spoke to Candelaria
Spoke to office is aware of admission
called ans service - spoke to Bakari

## 2023-02-13 NOTE — CONSULT NOTE ADULT - ASSESSMENT
melena due to nose bleed  nothing to do  pt seen, full note to follow 87 year old man with afib on xarelto, CHF, CAD with stents, prior epistaxis with ENT cauterization, admitted for severe epistaxis. Consult called for melena.     Melena due to recent nose bleeds. Non significant hemoglobin drop.   Outpatient follow up. If he has melena in the setting of no nose bleeds, may consider endoscopy but as patient 87 would favor conservative management due to age and risks of anesthesia.

## 2023-02-14 ENCOUNTER — TRANSCRIPTION ENCOUNTER (OUTPATIENT)
Age: 88
End: 2023-02-14

## 2023-02-14 LAB
ANION GAP SERPL CALC-SCNC: 6 MMOL/L — SIGNIFICANT CHANGE UP (ref 5–17)
BUN SERPL-MCNC: 48 MG/DL — HIGH (ref 7–23)
CALCIUM SERPL-MCNC: 9.1 MG/DL — SIGNIFICANT CHANGE UP (ref 8.5–10.1)
CHLORIDE SERPL-SCNC: 105 MMOL/L — SIGNIFICANT CHANGE UP (ref 96–108)
CO2 SERPL-SCNC: 27 MMOL/L — SIGNIFICANT CHANGE UP (ref 22–31)
CREAT SERPL-MCNC: 1.71 MG/DL — HIGH (ref 0.5–1.3)
EGFR: 38 ML/MIN/1.73M2 — LOW
GLUCOSE SERPL-MCNC: 102 MG/DL — HIGH (ref 70–99)
HCT VFR BLD CALC: 34.7 % — LOW (ref 39–50)
HGB BLD-MCNC: 11 G/DL — LOW (ref 13–17)
MCHC RBC-ENTMCNC: 29.1 PG — SIGNIFICANT CHANGE UP (ref 27–34)
MCHC RBC-ENTMCNC: 31.7 GM/DL — LOW (ref 32–36)
MCV RBC AUTO: 91.8 FL — SIGNIFICANT CHANGE UP (ref 80–100)
PLATELET # BLD AUTO: 205 K/UL — SIGNIFICANT CHANGE UP (ref 150–400)
POTASSIUM SERPL-MCNC: 4.3 MMOL/L — SIGNIFICANT CHANGE UP (ref 3.5–5.3)
POTASSIUM SERPL-SCNC: 4.3 MMOL/L — SIGNIFICANT CHANGE UP (ref 3.5–5.3)
RBC # BLD: 3.78 M/UL — LOW (ref 4.2–5.8)
RBC # FLD: 17 % — HIGH (ref 10.3–14.5)
SODIUM SERPL-SCNC: 138 MMOL/L — SIGNIFICANT CHANGE UP (ref 135–145)
WBC # BLD: 7.13 K/UL — SIGNIFICANT CHANGE UP (ref 3.8–10.5)
WBC # FLD AUTO: 7.13 K/UL — SIGNIFICANT CHANGE UP (ref 3.8–10.5)

## 2023-02-14 PROCEDURE — 99233 SBSQ HOSP IP/OBS HIGH 50: CPT

## 2023-02-14 RX ORDER — METOPROLOL TARTRATE 50 MG
50 TABLET ORAL
Refills: 0 | Status: DISCONTINUED | OUTPATIENT
Start: 2023-02-14 | End: 2023-02-15

## 2023-02-14 RX ORDER — ASPIRIN/CALCIUM CARB/MAGNESIUM 324 MG
81 TABLET ORAL DAILY
Refills: 0 | Status: DISCONTINUED | OUTPATIENT
Start: 2023-02-14 | End: 2023-02-15

## 2023-02-14 RX ADMIN — FINASTERIDE 5 MILLIGRAM(S): 5 TABLET, FILM COATED ORAL at 10:07

## 2023-02-14 RX ADMIN — Medication 81 MILLIGRAM(S): at 15:36

## 2023-02-14 RX ADMIN — ATORVASTATIN CALCIUM 20 MILLIGRAM(S): 80 TABLET, FILM COATED ORAL at 21:36

## 2023-02-14 RX ADMIN — RIVAROXABAN 15 MILLIGRAM(S): KIT at 17:06

## 2023-02-14 RX ADMIN — Medication 50 MILLIGRAM(S): at 21:36

## 2023-02-14 RX ADMIN — ATENOLOL 100 MILLIGRAM(S): 25 TABLET ORAL at 10:07

## 2023-02-14 RX ADMIN — Medication 150 MICROGRAM(S): at 06:05

## 2023-02-14 RX ADMIN — Medication 1000 UNIT(S): at 10:07

## 2023-02-14 RX ADMIN — Medication 40 MILLIGRAM(S): at 10:07

## 2023-02-14 NOTE — DISCHARGE NOTE PROVIDER - CARE PROVIDERS DIRECT ADDRESSES
,DirectAddress_Unknown,[email or web address] ,DirectAddress_Unknown,[email or web address]

## 2023-02-14 NOTE — DISCHARGE NOTE PROVIDER - HOSPITAL COURSE
86 y/o male with Hx. of Atrial fib (On Xarelto), Multiple episodes of epistaxis, CHF, CAD s/p stents who presented in the ED for black stool. His symptoms started 2 days ago and got worse last night. He also had nose bleed from R nostril but it stopped. He had multiple ER visits for epistaxis in the past, had ENT f/u and cauterization done by Dr. Jefferson.  The patient had COVID19 infection/CHF exacerbation last December.     Melena  Resolved. Unlikely GI bleed, GI eval appreciated, suspected due to epistaxis /swallowing blood  - No further workup at this time  - H&H stable  - ASA and Xarelto initially held, d/w GI/Cardio resumed both and H&H stable  - GI f/u appreciated    Elevated troponin   Hx CAD s/p stents  Likely demand ischemia, unlikely ACS  - Monitor on tele, noted with afib/flutter  - TroponinI hsT: <-139.53, <-158.73 (downtrended)  - 2/13/23: TTE: Severe pulmonary hypertension. EF 35-40 %  - D/C atenolol, started Metoprolol  - C/w atorvastatin, aspirin  - 2/15/23: NMST (-) for reversible defects. EF 35%  - Close outpatient f/u with cardiology     hx CHF (New systolic dysfunction)   Unlikely HF exacerbation  - 02 sats stable on room air  - CXR w/ pulmonary congestion   - 2/13/23: TTE: Severe pulmonary hypertension. EF 35-40 % ---- Echo in 2015 reveals normal LVF, no wall motion abnormalities  - C/w PO Lasix (reduced to 20mg) and metoprolol     YUE  Unknown Baseline, sCr stable  - s/p IVF, monitor  - Outpatient f/u with PCP    Mild Anemia  Likely from acute blood loss/ epistaxis, unlikely GI bleed  - Monitor H&H, stable after resuming Xarelto/ASA    Permanent Afib/flutter  Chronic and stable  - Xarelto 15mg w/ dinner and metoprolol    GOC / Advanced Directives  - Will have further discussion regarding Advanced Directives, in the process of those decisions since his hospitalization in December. As of now Full Code.    Updated Hal (son) on 2/13, 2/14, 2/15    Dispo: discharge to *HOME w/ FTF* in stable condition    Final diagnosis, treatment plan, and follow-up recommendations were discussed and explained to the patient. The patient was given an opportunity to ask questions concerning the diagnosis and treatment plan. The patient acknowledged understanding of the diagnosis, treatment, and follow-up recommendations. The patient was advised to seek urgent care upon discharge if worsening symptoms develop prior to scheduled follow-up. Time spent on discharge included time with the patient, and also coordinating discharge care as outlined below.    Total time spent: 50 min 88 y/o male with Hx. of Atrial fib (On Xarelto), Multiple episodes of epistaxis, CHF, CAD s/p stents who presented in the ED for black stool. His symptoms started 2 days ago and got worse last night. He also had nose bleed from R nostril but it stopped. He had multiple ER visits for epistaxis in the past, had ENT f/u and cauterization done by Dr. Jefferson.  The patient had COVID19 infection/CHF exacerbation last December.     Admitted to hospital medicine service for further management.     Melena  Resolved. Unlikely GI bleed, GI eval appreciated, suspected due to epistaxis /swallowing blood  - No further workup at this time  - H&H stable  - ASA and Xarelto initially held, d/w GI/Cardio resumed both and H&H stable  - GI f/u appreciated    Elevated troponin. Hx CAD s/p stents  Likely demand ischemia  - Monitored on tele, noted with afib/flutter  - TroponinI hsT: <-139.53, <-158.73 (downtrended)  - 2/13/23: TTE: Severe pulmonary hypertension. EF 35-40 %  - D/C atenolol, started Metoprolol  - C/w atorvastatin, aspirin, Eliquis  - 2/15/23: NMST (-) for reversible defects. EF 35%  - Close outpatient f/u with cardiology - [facility]    hx CHF (New systolic dysfunction)   Unlikely HF exacerbation, ?afib related, known CAD w/ stents. NMST (-)  - 02 sats stable on room air  - CXR w/ pulmonary congestion   - 2/13/23: TTE: Severe pulmonary hypertension. EF 35-40 % ---- Echo in 2015 reveals normal LVF, no wall motion abnormalities  - C/w PO Lasix (reduced to 20mg) and metoprolol     YUE  Unknown Baseline, sCr stable  - s/p IVF, monitor  - Outpatient f/u with PCP for repeat labs    Mild Anemia  Likely from acute blood loss/ epistaxis, unlikely GI bleed  - Monitor H&H, stable after resuming Xarelto/ASA    Permanent Afib/flutter  Chronic and stable  - Xarelto 15mg w/ dinner and metoprolol    GOC / Advanced Directives  - Will have further discussion regarding Advanced Directives, in the process of those decisions since his hospitalization in December. As of now Full Code.    Updated Hal (son) on 2/13, 2/14, 2/15    Dispo: discharge to *HOME w/ FTF* in stable condition    Final diagnosis, treatment plan, and follow-up recommendations were discussed and explained to the patient. The patient was given an opportunity to ask questions concerning the diagnosis and treatment plan. The patient acknowledged understanding of the diagnosis, treatment, and follow-up recommendations. The patient was advised to seek urgent care upon discharge if worsening symptoms develop prior to scheduled follow-up. Time spent on discharge included time with the patient, and also coordinating discharge care as outlined below.    Total time spent: 50 min

## 2023-02-14 NOTE — DISCHARGE NOTE PROVIDER - NSDCMRMEDTOKEN_GEN_ALL_CORE_FT
Aspir 81 oral delayed release tablet: 1 tab(s) orally once a day  atenolol 100 mg oral tablet: 1 tab(s) orally once a day  finasteride 5 mg oral tablet: 1 tab(s) orally once a day  furosemide 40 mg oral tablet: 1 tab(s) orally once a day  levothyroxine 150 mcg (0.15 mg) oral tablet: 1 tab(s) orally once a day  pravastatin 40 mg oral tablet: 1 tab(s) orally once a day  Vitamin D3 25 mcg (1000 intl units) oral tablet: 1 tab(s) orally once a day  Xarelto 15 mg oral tablet: 1 tab(s) orally once a day (in the evening)   aspirin 81 mg oral delayed release tablet: 1 tab(s) orally once a day  cholecalciferol oral tablet: 1000 unit(s) orally once a day  finasteride 5 mg oral tablet: 1 tab(s) orally once a day  furosemide 20 mg oral tablet: 1 tab(s) orally once a day  levothyroxine 150 mcg (0.15 mg) oral tablet: 1 tab(s) orally once a day  metoprolol succinate 50 mg oral tablet, extended release: 1 tab(s) orally 2 times a day  pravastatin 40 mg oral tablet: 1 tab(s) orally once a day  Xarelto 15 mg oral tablet: 1 tab(s) orally once a day (in the evening)

## 2023-02-14 NOTE — DISCHARGE NOTE PROVIDER - PROVIDER TOKENS
PROVIDER:[TOKEN:[[id number]]],PROVIDER:[TOKEN:[[id number]]] PROVIDER:[TOKEN:[[id number]]],PROVIDER:[TOKEN:[[id number]]],PROVIDER:[TOKEN:[[id number]]]

## 2023-02-14 NOTE — PROGRESS NOTE ADULT - SUBJECTIVE AND OBJECTIVE BOX
HPI: The patient is an 86 yo male with Hx. of Atrial fib. on Xarelto, Multiple episodes of epistaxis, CHF,CAD s/p stents who presented  in the ED for  black stool. His symptoms started 2 days ago and got worse last night. He also had nose bleed from R nostril but it stopped. He had multiple ER visits for epistaxis in the past, had ENT f/u and cauterization done by Dr. Jefferson.  The patient had COVID 19 infection,   CHF exacerbation last December.   Cardiology consulted for CHF and elevated trops. Pt. admitted for GI bleed. Also w/ trop elevation, hospitalist suspects CHF. Pt denies chest pain, dyspnea, LE edema. No cardiac symptoms.    2//13/23: no complaints, Tele: Afluttter 70s, 40s when sleeping, Echo pending   2/14/23: Pt sitting up in chair. Denies cp/sob/palpitations. Denies further bleeding.   Tele: aflutter 60's-90's       MEDICATIONS:  OUTPATIENT:  Home Medications:  Aspir 81 oral delayed release tablet: 1 tab(s) orally once a day (12 Feb 2023 16:44)  atenolol 100 mg oral tablet: 1 tab(s) orally once a day (12 Feb 2023 16:44)  finasteride 5 mg oral tablet: 1 tab(s) orally once a day (12 Feb 2023 16:44)  furosemide 40 mg oral tablet: 1 tab(s) orally once a day (12 Feb 2023 16:44)  levothyroxine 150 mcg (0.15 mg) oral tablet: 1 tab(s) orally once a day (12 Feb 2023 16:44)  pravastatin 40 mg oral tablet: 1 tab(s) orally once a day (12 Feb 2023 16:44)  Vitamin D3 25 mcg (1000 intl units) oral tablet: 1 tab(s) orally once a day (12 Feb 2023 16:44)  Xarelto 15 mg oral tablet: 1 tab(s) orally once a day (in the evening) (12 Feb 2023 16:44)    MEDICATIONS  (STANDING):  atenolol  Tablet 100 milliGRAM(s) Oral daily  atorvastatin 20 milliGRAM(s) Oral at bedtime  cholecalciferol 1000 Unit(s) Oral daily  finasteride 5 milliGRAM(s) Oral daily  furosemide    Tablet 40 milliGRAM(s) Oral daily  levothyroxine 150 MICROGram(s) Oral daily  rivaroxaban 15 milliGRAM(s) Oral with dinner    MEDICATIONS  (PRN):  acetaminophen     Tablet .. 650 milliGRAM(s) Oral every 6 hours PRN Temp greater or equal to 38C (100.4F), Mild Pain (1 - 3)  LORazepam     Tablet 0.25 milliGRAM(s) Oral at bedtime PRN Anxiety  melatonin 3 milliGRAM(s) Oral at bedtime PRN Insomnia  ondansetron Injectable 4 milliGRAM(s) IV Push every 8 hours PRN Nausea and/or Vomiting    Vital Signs Last 24 Hrs  T(C): 36.6 (13 Feb 2023 20:40), Max: 36.6 (13 Feb 2023 20:40)  T(F): 97.8 (13 Feb 2023 20:40), Max: 97.8 (13 Feb 2023 20:40)  HR: 80 (13 Feb 2023 20:40) (80 - 80)  BP: 121/67 (13 Feb 2023 20:40) (121/67 - 121/67)  BP(mean): --  RR: 18 (13 Feb 2023 20:40) (18 - 18)  SpO2: 93% (13 Feb 2023 20:40) (93% - 93%)    Parameters below as of 13 Feb 2023 20:40  Patient On (Oxygen Delivery Method): room air    PHYSICAL EXAM:    Constitutional: NAD, awake and alert,   HEENT: PERR, EOMI,  No oral cyananosis.  Neck:  supple,  No JVD  Respiratory: Breath sounds are clear bilaterally, No wheezing, rales or rhonchi  Cardiovascular: S1 and S2, regular rate and rhythm, no Murmurs, gallops or rubs  Gastrointestinal: Bowel Sounds present, soft, nontender.   Extremities: No peripheral edema. No clubbing or cyanosis.  Vascular: 2+ peripheral pulses  Neurological: A/O x 3, no focal deficits  Musculoskeletal: no calf tenderness.  Skin: No rashes.    ===============================  ===============================  LABS:                           11.0   7.13  )-----------( 205      ( 14 Feb 2023 06:26 )             34.7                           10.4   6.86  )-----------( 211      ( 13 Feb 2023 06:33 )             33.8     02-14    138  |  105  |  48<H>  ----------------------------<  102<H>  4.3   |  27  |  1.71<H>    Ca    9.1      14 Feb 2023 06:26    TPro  7.3  /  Alb  3.3  /  TBili  0.9  /  DBili  x   /  AST  20  /  ALT  12  /  AlkPhos  80  02-12      02-13    138  |  105  |  48<H>  ----------------------------<  108<H>  4.0   |  27  |  1.43<H>    Ca    8.8      13 Feb 2023 06:33    TPro  7.3  /  Alb  3.3  /  TBili  0.9  /  DBili  x   /  AST  20  /  ALT  12  /  AlkPhos  80  02-12    - TroponinI hsT: <-139.53, <-158.73                        11.0   6.77  )-----------( 223      ( 12 Feb 2023 11:44 )             34.4     12 Feb 2023 11:44    136    |  102    |  56     ----------------------------<  106    3.9     |  29     |  1.74     Ca    9.0        12 Feb 2023 11:44    TPro  7.3    /  Alb  3.3    /  TBili  0.9    /  DBili  x      /  AST  20     /  ALT  12     /  AlkPhos  80     12 Feb 2023 11:44        THYROID STUDIES:    ===============================  ===============================  CARDIAC BIOMARKERS:  -------      -BNP VALUES:  02-12 @ 11:44  Pro Bnp 34981    -------  -TROPONIN VALUES:   Troponin I, High Sensitivity Result: 158.73 ng/L *H* (02-12-23 @ 11:44)    ===============================  ===============================  EKG:  extensive artifact  possible accelerated junctional v. sinus  no ischemic changes. PRWP transitions V6, Qs III, aVF  ===============================  RADIOLOGY:  CXR prelim. - possible mild pul vascular congestion.    < from: TTE Echo Complete w/o Contrast w/ Doppler (02.13.23 @ 15:41) >   Impression     Summary     There is calcification of anterior mitral valve leaflet. The leaflet   opening is normal.   Mild mitral annular calcification is present.   Moderate (2+) mitral regurgitation is present.   EA reversal of themitral inflow consistent with reduced compliance of   the   left ventricle.   The aortic valve is well visualized, appears mildly calcified. Valve   opening seems to be normal.   Mild to Moderate aortic regurgitation is present.   Normal appearing tricuspid valve structure.   Moderate (2+) to severe eccentric tricuspid valve regurgitation is   present.   Severe pulmonary hypertension.   Estimated left ventricular ejection fraction is 35-40 %.   The left ventricle is normal in size.   Mild concentric left ventricular hypertrophy is present.   Moderate, diffuse hypokinesis of the left ventricle is present.   The left atrium is moderately dilated.   The right atrium appears moderately dilated.   Normal appearing right ventricle structure and function.   IVC is dilated and not collapsing with inspiration.     Signature      < end of copied text >

## 2023-02-14 NOTE — DISCHARGE NOTE PROVIDER - NSDCCAREPROVSEEN_GEN_ALL_CORE_FT
Inocencia, Ashley Hardy, Eduardo Rice, Anil G Palla, Brett Davalos, Betty Deal, Curt Dill, Paulino Lopez, Gena

## 2023-02-14 NOTE — PROGRESS NOTE ADULT - SUBJECTIVE AND OBJECTIVE BOX
Chief Complaint: GI bleed, CHF    Interval Events/ROS/Subjective:  2/13/23: Patient seen and examined. No cp or dyspnea. Feeling well. Resuming Xarelto, monitor H&H. PT destiney, case d/w GI and cardio  2/14/23:    All other review of systems is negative unless indicated above    Physical Exam:  T(C): 36.6 (13 Feb 2023 20:40), Max: 36.6 (13 Feb 2023 20:40)  T(F): 97.8 (13 Feb 2023 20:40), Max: 97.8 (13 Feb 2023 20:40)  HR: 80 (13 Feb 2023 20:40) (80 - 80)  BP: 121/67 (13 Feb 2023 20:40) (121/67 - 121/67)  RR: 18 (13 Feb 2023 20:40) (18 - 18)  SpO2: 93% (13 Feb 2023 20:40) (93% - 93%)    Parameters below as of 13 Feb 2023 20:40  Patient On (Oxygen Delivery Method): room air    Constitutional: Awake and alert  HEENT: Normal Hearing, MMM  Neck: Soft and supple, No LAD, No JVD  Respiratory: Breath sounds are clear bilaterally, No wheezing, rales or rhonchi  Cardiovascular: S1 and S2, regular rate and rhythm, no Murmurs, gallops or rubs  Gastrointestinal: Bowel Sounds present, soft, nontender, nondistended, no guarding, no rebound  Extremities: No peripheral edema  Vascular: 2+ peripheral pulses  Neurological: A/O x 3, no focal deficits  Musculoskeletal: 5/5 strength b/l upper and lower extremities  Skin: No rashes    Labs:             11.0   7.13  )-----------( 205      ( 14 Feb 2023 06:26 )             34.7     02-14    138  |  105  |  48<H>  ----------------------------<  102<H>  4.3   |  27  |  1.71<H>    Ca    9.1      14 Feb 2023 06:26    TPro  7.3  /  Alb  3.3  /  TBili  0.9  /  DBili  x   /  AST  20  /  ALT  12  /  AlkPhos  80  02-12    Troponin: 158.73 ng/L, Troponin: 139.53 ng/L    BNP: 22554    Micro:    2/12/23: COVID19 PCR (-)    Radiology:    2/12/23: Xray Chest 1 View: Pulmonary edema changes with left pleural effusion.    Cardiac Testing:    TroponinI hsT: <-139.53, <-158.73    2/13/23: TTE: There is calcification of anterior mitral valve leaflet. The leaflet opening is normal. Mild mitral annular calcification is present. Moderate (2+) mitral regurgitation is present. EA reversal of the mitral inflow consistent with reduced compliance of the left ventricle. The aortic valve is well visualized, appears mildly calcified. Valve opening seems to be normal. Mild to Moderate aortic regurgitation is present. Normal appearing tricuspid valve structure. Moderate (2+) to severe eccentric tricuspid valve regurgitation is present. Severe pulmonary hypertension. Estimated left ventricular ejection fraction is 35-40 %. The left ventricle is normal in size. Mild concentric left ventricular hypertrophy is present. Moderate, diffuse hypokinesis of the left ventricle is present. The left atrium is moderately dilated. The right atrium appears moderately dilated. Normal appearing right ventricle structure and function. IVC is dilated and not collapsing with inspiration.    2/12/23: EKG: Diagnosis Line *** Suspect arm lead reversal, interpretation assumes no reversal. Atrial fibrillation Low voltage QRS. Possible Inferior infarct , age undetermined. Anterolateral infarct , age undetermined. Abnormal ECG.     Medications:  atenolol  Tablet 100 milliGRAM(s) Oral daily  atorvastatin 20 milliGRAM(s) Oral at bedtime  cholecalciferol 1000 Unit(s) Oral daily  finasteride 5 milliGRAM(s) Oral daily  furosemide    Tablet 40 milliGRAM(s) Oral daily  levothyroxine 150 MICROGram(s) Oral daily  rivaroxaban 15 milliGRAM(s) Oral with dinner    MEDICATIONS  (PRN):  acetaminophen     Tablet .. 650 milliGRAM(s) Oral every 6 hours PRN Temp greater or equal to 38C (100.4F), Mild Pain (1 - 3)  LORazepam     Tablet 0.25 milliGRAM(s) Oral at bedtime PRN Anxiety  melatonin 3 milliGRAM(s) Oral at bedtime PRN Insomnia  ondansetron Injectable 4 milliGRAM(s) IV Push every 8 hours PRN Nausea and/or Vomiting    Home Medications:  Aspir 81 oral delayed release tablet: 1 tab(s) orally once a day (12 Feb 2023 16:44)  atenolol 100 mg oral tablet: 1 tab(s) orally once a day (12 Feb 2023 16:44)  finasteride 5 mg oral tablet: 1 tab(s) orally once a day (12 Feb 2023 16:44)  furosemide 40 mg oral tablet: 1 tab(s) orally once a day (12 Feb 2023 16:44)  levothyroxine 150 mcg (0.15 mg) oral tablet: 1 tab(s) orally once a day (12 Feb 2023 16:44)  pravastatin 40 mg oral tablet: 1 tab(s) orally once a day (12 Feb 2023 16:44)  Vitamin D3 25 mcg (1000 intl units) oral tablet: 1 tab(s) orally once a day (12 Feb 2023 16:44)  Xarelto 15 mg oral tablet: 1 tab(s) orally once a day (in the evening) (12 Feb 2023 16:44)   Chief Complaint: GI bleed, CHF    Interval Events/ROS/Subjective:  2/13/23: Patient seen and examined. No cp or dyspnea. Feeling well. Resuming Xarelto, monitor H&H. PT destiney, case d/w GI and cardio  2/14/23: Patient seen and examined. No cp or dyspnea. Feeling well. RT eye redness, patient states chronic draining eye    All other review of systems is negative unless indicated above    Physical Exam:  T(C): 36.6 (13 Feb 2023 20:40), Max: 36.6 (13 Feb 2023 20:40)  T(F): 97.8 (13 Feb 2023 20:40), Max: 97.8 (13 Feb 2023 20:40)  HR: 80 (13 Feb 2023 20:40) (80 - 80)  BP: 121/67 (13 Feb 2023 20:40) (121/67 - 121/67)  RR: 18 (13 Feb 2023 20:40) (18 - 18)  SpO2: 93% (13 Feb 2023 20:40) (93% - 93%)    Parameters below as of 13 Feb 2023 20:40  Patient On (Oxygen Delivery Method): room air      Constitutional: Awake and alert  HEENT: Normal Hearing, MMM, Rt eye redness/draining  Neck: Soft and supple, No LAD, No JVD  Respiratory: Breath sounds are clear bilaterally, No wheezing, rales or rhonchi  Cardiovascular: S1 and S2, regular rate and rhythm, no Murmurs, gallops or rubs  Gastrointestinal: Bowel Sounds present, soft, nontender, nondistended, no guarding, no rebound  Extremities: No peripheral edema  Vascular: 2+ peripheral pulses  Neurological: A/O x 3, forgetful  Musculoskeletal: 5/5 strength b/l upper and lower extremities  Skin: No rashes    Labs:             11.0   7.13  )-----------( 205      ( 14 Feb 2023 06:26 )             34.7     02-14    138  |  105  |  48<H>  ----------------------------<  102<H>  4.3   |  27  |  1.71<H>    Ca    9.1      14 Feb 2023 06:26    TPro  7.3  /  Alb  3.3  /  TBili  0.9  /  DBili  x   /  AST  20  /  ALT  12  /  AlkPhos  80  02-12    Troponin: 158.73 ng/L, Troponin: 139.53 ng/L    BNP: 07880    Micro:    2/12/23: COVID19 PCR (-)    Radiology:    2/12/23: Xray Chest 1 View: Pulmonary edema changes with left pleural effusion.    Cardiac Testing:    TroponinI hsT: <-139.53, <-158.73    2/13/23: TTE: There is calcification of anterior mitral valve leaflet. The leaflet opening is normal. Mild mitral annular calcification is present. Moderate (2+) mitral regurgitation is present. EA reversal of the mitral inflow consistent with reduced compliance of the left ventricle. The aortic valve is well visualized, appears mildly calcified. Valve opening seems to be normal. Mild to Moderate aortic regurgitation is present. Normal appearing tricuspid valve structure. Moderate (2+) to severe eccentric tricuspid valve regurgitation is present. Severe pulmonary hypertension. Estimated left ventricular ejection fraction is 35-40 %. The left ventricle is normal in size. Mild concentric left ventricular hypertrophy is present. Moderate, diffuse hypokinesis of the left ventricle is present. The left atrium is moderately dilated. The right atrium appears moderately dilated. Normal appearing right ventricle structure and function. IVC is dilated and not collapsing with inspiration.    2/12/23: EKG: Diagnosis Line *** Suspect arm lead reversal, interpretation assumes no reversal. Atrial fibrillation Low voltage QRS. Possible Inferior infarct , age undetermined. Anterolateral infarct , age undetermined. Abnormal ECG.     Medications:  atenolol  Tablet 100 milliGRAM(s) Oral daily  atorvastatin 20 milliGRAM(s) Oral at bedtime  cholecalciferol 1000 Unit(s) Oral daily  finasteride 5 milliGRAM(s) Oral daily  furosemide    Tablet 40 milliGRAM(s) Oral daily  levothyroxine 150 MICROGram(s) Oral daily  rivaroxaban 15 milliGRAM(s) Oral with dinner    MEDICATIONS  (PRN):  acetaminophen     Tablet .. 650 milliGRAM(s) Oral every 6 hours PRN Temp greater or equal to 38C (100.4F), Mild Pain (1 - 3)  LORazepam     Tablet 0.25 milliGRAM(s) Oral at bedtime PRN Anxiety  melatonin 3 milliGRAM(s) Oral at bedtime PRN Insomnia  ondansetron Injectable 4 milliGRAM(s) IV Push every 8 hours PRN Nausea and/or Vomiting    Home Medications:  Aspir 81 oral delayed release tablet: 1 tab(s) orally once a day (12 Feb 2023 16:44)  atenolol 100 mg oral tablet: 1 tab(s) orally once a day (12 Feb 2023 16:44)  finasteride 5 mg oral tablet: 1 tab(s) orally once a day (12 Feb 2023 16:44)  furosemide 40 mg oral tablet: 1 tab(s) orally once a day (12 Feb 2023 16:44)  levothyroxine 150 mcg (0.15 mg) oral tablet: 1 tab(s) orally once a day (12 Feb 2023 16:44)  pravastatin 40 mg oral tablet: 1 tab(s) orally once a day (12 Feb 2023 16:44)  Vitamin D3 25 mcg (1000 intl units) oral tablet: 1 tab(s) orally once a day (12 Feb 2023 16:44)  Xarelto 15 mg oral tablet: 1 tab(s) orally once a day (in the evening) (12 Feb 2023 16:44)   Chief Complaint: GI bleed, CHF    Interval Events/ROS/Subjective:  2/13/23: Patient seen and examined. No cp or dyspnea. Feeling well. Resuming Xarelto, monitor H&H. PT destiney, case d/w GI and cardio  2/14/23: Patient seen and examined. No cp or dyspnea. Feeling well. RT eye redness, patient states chronic draining eye    All other review of systems is negative unless indicated above    Physical Exam:  T(C): 36.6 (13 Feb 2023 20:40), Max: 36.6 (13 Feb 2023 20:40)  T(F): 97.8 (13 Feb 2023 20:40), Max: 97.8 (13 Feb 2023 20:40)  HR: 80 (13 Feb 2023 20:40) (80 - 80)  BP: 121/67 (13 Feb 2023 20:40) (121/67 - 121/67)  RR: 18 (13 Feb 2023 20:40) (18 - 18)  SpO2: 93% (13 Feb 2023 20:40) (93% - 93%)    Parameters below as of 13 Feb 2023 20:40  Patient On (Oxygen Delivery Method): room air      Constitutional: Awake and alert  HEENT: Normal Hearing, MMM, Rt eye redness/draining  Neck: Soft and supple, No LAD, No JVD  Respiratory: Breath sounds are clear bilaterally, No wheezing, rales or rhonchi  Cardiovascular: S1 and S2, regular rate and rhythm, no Murmurs, gallops or rubs  Gastrointestinal: Bowel Sounds present, soft, nontender, nondistended, no guarding, no rebound  Extremities: No peripheral edema  Vascular: 2+ peripheral pulses  Neurological: A/O x 3, forgetful  Musculoskeletal: 5/5 strength b/l upper and lower extremities  Skin: No rashes    Labs:             11.0   7.13  )-----------( 205      ( 14 Feb 2023 06:26 )             34.7     02-14    138  |  105  |  48<H>  ----------------------------<  102<H>  4.3   |  27  |  1.71<H>    Ca    9.1      14 Feb 2023 06:26    TPro  7.3  /  Alb  3.3  /  TBili  0.9  /  DBili  x   /  AST  20  /  ALT  12  /  AlkPhos  80  02-12    Troponin: 158.73 ng/L, Troponin: 139.53 ng/L    BNP: 20126    Micro:    2/12/23: COVID19 PCR (-)    Radiology:    2/12/23: Xray Chest 1 View: Pulmonary edema changes with left pleural effusion.    Cardiac Testing:    TroponinI hsT: <-139.53, <-158.73    2/13/23: TTE: There is calcification of anterior mitral valve leaflet. The leaflet opening is normal. Mild mitral annular calcification is present. Moderate (2+) mitral regurgitation is present. EA reversal of the mitral inflow consistent with reduced compliance of the left ventricle. The aortic valve is well visualized, appears mildly calcified. Valve opening seems to be normal. Mild to Moderate aortic regurgitation is present. Normal appearing tricuspid valve structure. Moderate (2+) to severe eccentric tricuspid valve regurgitation is present. Severe pulmonary hypertension. Estimated left ventricular ejection fraction is 35-40 %. The left ventricle is normal in size. Mild concentric left ventricular hypertrophy is present. Moderate, diffuse hypokinesis of the left ventricle is present. The left atrium is moderately dilated. The right atrium appears moderately dilated. Normal appearing right ventricle structure and function. IVC is dilated and not collapsing with inspiration.    2/12/23: EKG: Diagnosis Line *** Suspect arm lead reversal, interpretation assumes no reversal. Atrial fibrillation Low voltage QRS. Possible Inferior infarct , age undetermined. Anterolateral infarct , age undetermined. Abnormal ECG.     Telemetry, I personally reviewed:  2/14: Aflutter 60-90s - d/c tele    Medications:  atenolol  Tablet 100 milliGRAM(s) Oral daily  atorvastatin 20 milliGRAM(s) Oral at bedtime  cholecalciferol 1000 Unit(s) Oral daily  finasteride 5 milliGRAM(s) Oral daily  furosemide    Tablet 40 milliGRAM(s) Oral daily  levothyroxine 150 MICROGram(s) Oral daily  rivaroxaban 15 milliGRAM(s) Oral with dinner    MEDICATIONS  (PRN):  acetaminophen     Tablet .. 650 milliGRAM(s) Oral every 6 hours PRN Temp greater or equal to 38C (100.4F), Mild Pain (1 - 3)  LORazepam     Tablet 0.25 milliGRAM(s) Oral at bedtime PRN Anxiety  melatonin 3 milliGRAM(s) Oral at bedtime PRN Insomnia  ondansetron Injectable 4 milliGRAM(s) IV Push every 8 hours PRN Nausea and/or Vomiting    Home Medications:  Aspir 81 oral delayed release tablet: 1 tab(s) orally once a day (12 Feb 2023 16:44)  atenolol 100 mg oral tablet: 1 tab(s) orally once a day (12 Feb 2023 16:44)  finasteride 5 mg oral tablet: 1 tab(s) orally once a day (12 Feb 2023 16:44)  furosemide 40 mg oral tablet: 1 tab(s) orally once a day (12 Feb 2023 16:44)  levothyroxine 150 mcg (0.15 mg) oral tablet: 1 tab(s) orally once a day (12 Feb 2023 16:44)  pravastatin 40 mg oral tablet: 1 tab(s) orally once a day (12 Feb 2023 16:44)  Vitamin D3 25 mcg (1000 intl units) oral tablet: 1 tab(s) orally once a day (12 Feb 2023 16:44)  Xarelto 15 mg oral tablet: 1 tab(s) orally once a day (in the evening) (12 Feb 2023 16:44)

## 2023-02-14 NOTE — DISCHARGE NOTE PROVIDER - NSDCCPCAREPLAN_GEN_ALL_CORE_FT
PRINCIPAL DISCHARGE DIAGNOSIS  Diagnosis: Melena  Assessment and Plan of Treatment: Resolved. Unlikely GI bleed, suspected due to epistaxis /swallowing blood. You were evaluated by a gastroenterologist (Dr. Dill) who reccomended no further workup at this time. H&H stable on Xarelto.   - Continue to hold aspirin      SECONDARY DISCHARGE DIAGNOSES  Diagnosis: Elevated troponin  Assessment and Plan of Treatment: Elevated troponin. Hx CAD s/p stents ~ You were monitored on telemetry without any events. Your echocardiogram revealed new systolic heart failure. 2/13/23: TTE: Severe pulmonary hypertension. EF 35-40%.   - C/w atenolol and atorvastatin    Diagnosis: HF (heart failure)  Assessment and Plan of Treatment: WHAT IS HEART FAILURE? Heart failure (HF) is a condition in which the heart cannot pump enough blood to meet the body’s needs. The weakening of the heart’s pumping ability results in the buildup of fluid in the feet, ankles and legs resulting in edema, tiredness, and shortness of breath.  THINGS TO DO: (1) Weigh yourself daily – keep a log for you cardiologist (2) Maintain a heart healthy diet and limit dietary sodium (3) Drink liquids as directed – you may need to limit the amount of liquid you drink to prevent fluid buildup (4) Maintain a healthy weight (5) Stay active with exercise  MONITOR THESE SIGNS AND SYMPTOMS: (1) Worsening shortness of breath at rest or at night (2) worsening leg swelling (3) Abdominal pain or swelling (4) Chest pain or palpitations (5) Weight gain of 5lbs or more in 1 week or 2-3lbs in 24hours. If you experience any of these, DO alert your primary care provider, or return to the Emergency Department if you feel very sick.     PRINCIPAL DISCHARGE DIAGNOSIS  Diagnosis: Melena  Assessment and Plan of Treatment: Resolved. Unlikely GI bleed, suspected due to epistaxis /swallowing blood. You were evaluated by a gastroenterologist (Dr. Dill) who reccomended no further workup at this time.   - Follow up with your primary care provider for further management.      SECONDARY DISCHARGE DIAGNOSES  Diagnosis: Elevated troponin  Assessment and Plan of Treatment: Elevated troponin. Hx CAD s/p stents ~ You were monitored on telemetry without any events. Your echocardiogram revealed new systolic heart failure. 2/13/23: TTE: Severe pulmonary hypertension. EF 35-40%. You had a stress test done which showed no evidence of reversible defects. Follow up closely with Dr. Tan (Cardiologist) after discharge.    Diagnosis: HF (heart failure)  Assessment and Plan of Treatment: WHAT IS HEART FAILURE? Heart failure (HF) is a condition in which the heart cannot pump enough blood to meet the body’s needs. The weakening of the heart’s pumping ability results in the buildup of fluid in the feet, ankles and legs resulting in edema, tiredness, and shortness of breath.  THINGS TO DO: (1) Weigh yourself daily – keep a log for you cardiologist (2) Maintain a heart healthy diet and limit dietary sodium (3) Drink liquids as directed – you may need to limit the amount of liquid you drink to prevent fluid buildup (4) Maintain a healthy weight (5) Stay active with exercise  MONITOR THESE SIGNS AND SYMPTOMS: (1) Worsening shortness of breath at rest or at night (2) worsening leg swelling (3) Abdominal pain or swelling (4) Chest pain or palpitations (5) Weight gain of 5lbs or more in 1 week or 2-3lbs in 24hours. If you experience any of these, DO alert your primary care provider, or return to the Emergency Department if you feel very sick.    Diagnosis: YUE (acute kidney injury)  Assessment and Plan of Treatment: Follow up with Dr. Reyes for repeat labs after discharge.

## 2023-02-14 NOTE — PROGRESS NOTE ADULT - PROBLEM SELECTOR PLAN 1
-mildly elevated likely due to demand ischemia, not ACS.  Pt without complaints of chest pain or shortness of breath.

## 2023-02-14 NOTE — PROGRESS NOTE ADULT - ASSESSMENT
86 y/o male with Hx. of Atrial fib (On Xarelto), Multiple episodes of epistaxis, CHF, CAD s/p stents who presented in the ED for black stool. His symptoms started 2 days ago and got worse last night. He also had nose bleed from R nostril but it stopped. He had multiple ER visits for epistaxis in the past, had ENT f/u and cauterization done by Dr. Jefferson.  The patient had COVID19 infection/CHF exacerbation last December.     Melena   Unlikely GI bleed, GI eval appreciated, suspected due to epistaxis /swallowing blood  - No further workup at this time  - H&H stable  - ASA and Xarelto initially held, d/w GI/Cardio resume Xarelto only and monitor  - GI f/u appreciated    Elevated troponin   Hx CAD s/p stents  Likely demand ischemia, unlikely ACS  - Monitor on tele, noted with afib/flutter  - TroponinI hsT: <-139.53, <-158.73 (downtrended)  - 2/13/23: TTE: Severe pulmonary hypertension. EF 35-40 %  - C/w atenolol and atorvastatin    YUE  Unknown Baseline, sCr improved  - s/p IVF, monitor  - Nephrology consulted    Mild Anemia  Likely from acute blood loss/ epistaxis, unlikely GI bleed  - Monitor H&H, resuming Xarelto this evening    hx CHF  Unlikely HF exacerbation  - CXR, possible congestion, awaiting read  - Echo pending  - No IV diuresis at this time, c/w PO  - Cardio f/u appreciated     Permanent Afib/flutter  - Resume Xarelto 15mg dinner  - Continue Atenolol    Full Code.    PT eval pending     Updated Nathanael  Medically acute, c/w inpatient management. 86 y/o male with Hx. of Atrial fib (On Xarelto), Multiple episodes of epistaxis, CHF, CAD s/p stents who presented in the ED for black stool. His symptoms started 2 days ago and got worse last night. He also had nose bleed from R nostril but it stopped. He had multiple ER visits for epistaxis in the past, had ENT f/u and cauterization done by Dr. Jefferson.  The patient had COVID19 infection/CHF exacerbation last December.     Melena   Unlikely GI bleed, GI eval appreciated, suspected due to epistaxis /swallowing blood  - No further workup at this time  - H&H stable  - ASA and Xarelto initially held, d/w GI/Cardio resume Xarelto only and monitor  - GI f/u appreciated    Elevated troponin   Hx CAD s/p stents  Likely demand ischemia, unlikely ACS  - Monitor on tele, noted with afib/flutter  - TroponinI hsT: <-139.53, <-158.73 (downtrended)  - 2/13/23: TTE: Severe pulmonary hypertension. EF 35-40 %  - C/w atenolol and atorvastatin    YUE  Unknown Baseline, sCr stable  - s/p IVF, monitor  - Nephrology consulted    Mild Anemia  Likely from acute blood loss/ epistaxis, unlikely GI bleed  - Monitor H&H, stable after resuming Xarelto on 2/13    hx CHF (New systolic dysfunction)   Unlikely HF exacerbation  - 02 sats stable on room air  - CXR w/ pulmonary congestion   - 2/13/23: TTE: Severe pulmonary hypertension. EF 35-40 %  - C/w PO Lasix     Permanent Afib/flutter  - Resume Xarelto 15mg dinner  - Continue Atenolol    Full Code.    PT eval pending     Updated Nathanael (son) on 2/13.  Medically acute, c/w inpatient management. 86 y/o male with Hx. of Atrial fib (On Xarelto), Multiple episodes of epistaxis, CHF, CAD s/p stents who presented in the ED for black stool. His symptoms started 2 days ago and got worse last night. He also had nose bleed from R nostril but it stopped. He had multiple ER visits for epistaxis in the past, had ENT f/u and cauterization done by Dr. Jefferson.  The patient had COVID19 infection/CHF exacerbation last December.     Melena  Resolved. Unlikely GI bleed, GI eval appreciated, suspected due to epistaxis /swallowing blood  - No further workup at this time  - H&H stable  - ASA and Xarelto initially held, d/w GI/Cardio resume Xarelto only and monitor  - GI f/u appreciated    Elevated troponin   Hx CAD s/p stents  Likely demand ischemia, unlikely ACS  - Monitor on tele, noted with afib/flutter  - TroponinI hsT: <-139.53, <-158.73 (downtrended)  - 2/13/23: TTE: Severe pulmonary hypertension. EF 35-40 %  - C/w atenolol and atorvastatin    hx CHF (New systolic dysfunction)   Unlikely HF exacerbation  - 02 sats stable on room air  - CXR w/ pulmonary congestion   - Maximize GMDT  - 2/13/23: TTE: Severe pulmonary hypertension. EF 35-40 % ---- Echo in 2015 reveals normal LVF, no wall motion abnormalities  - C/w PO Lasix    YUE  Unknown Baseline, sCr stable  - s/p IVF, monitor  - Nephrology consulted    Mild Anemia  Likely from acute blood loss/ epistaxis, unlikely GI bleed  - Monitor H&H, stable after resuming Xarelto on 2/13    Permanent Afib/flutter  - Resume Xarelto 15mg dinner  - Continue Atenolol    GOC / Advanced Directives  - Will have further discussion regarding Advanced Directives, in the process of those decisions since his hospitalization in December. As of now Full Code.    PT eval, recommending IANUmm Hong (son) on 2/13, 2/14  Medically acute, c/w inpatient management.

## 2023-02-14 NOTE — PROGRESS NOTE ADULT - PROBLEM SELECTOR PLAN 2
elevated BNP but no dyspnea, no signs of heart failure.  -CXR possible congestion.  -monitor volume status no need for diuresis at present.  - Echo shows EF 35-40%, mod MR, mild-mod AI, mod-severe TR, severe pulmonary HTN, mild LVH, mod diffuse hypokinesis LV.  Echo in 2015 reveals normal LVF, no wall motion abnormalities

## 2023-02-14 NOTE — DISCHARGE NOTE PROVIDER - NSDCFUADDINST_GEN_ALL_CORE_FT
It is important to weigh yourself daily. Make sure you use a reliable scale. Use the same scale each day. Keep this daily weight chart near your scale. Weigh yourself each morning at the same time after you use the bathroom. Weight gain of 5 lbs or more in 1 week or 2-3 lbs in 24hours contact your primary care provider or cardiologist for further instructions.

## 2023-02-14 NOTE — DISCHARGE NOTE PROVIDER - CARE PROVIDER_API CALL
Anselmo Reyes)  Hematology; Internal Medicine  [address]  [place] [address]  Phone: [phone]  Fax: [phone]  Follow Up Time:     Palla, Venugopal R (MD)  Cardiovascular Disease; Internal Medicine  [address] [place], [place] [id number]  Phone: [phone]  Fax: [phone]  Follow Up Time:    Anselmo Reyes)  Hematology; Internal Medicine  [address]  [place] [address]  Phone: [phone]  Fax: [phone]  Follow Up Time:     Palla, Venugopal R (MD)  Cardiovascular Disease; Internal Medicine  [address] [place], [place] [id number]  Phone: [phone]  Fax: [phone]  Follow Up Time:     Dianne Tan)  Cardiovascular Disease; Interventional Cardiology  [address] [place] [address]  Phone: [phone]  Fax: [phone]  Follow Up Time:

## 2023-02-15 ENCOUNTER — TRANSCRIPTION ENCOUNTER (OUTPATIENT)
Age: 88
End: 2023-02-15

## 2023-02-15 VITALS
TEMPERATURE: 98 F | OXYGEN SATURATION: 91 % | HEART RATE: 69 BPM | SYSTOLIC BLOOD PRESSURE: 118 MMHG | RESPIRATION RATE: 18 BRPM | DIASTOLIC BLOOD PRESSURE: 71 MMHG

## 2023-02-15 LAB
ANION GAP SERPL CALC-SCNC: 6 MMOL/L — SIGNIFICANT CHANGE UP (ref 5–17)
BUN SERPL-MCNC: 58 MG/DL — HIGH (ref 7–23)
CALCIUM SERPL-MCNC: 9.1 MG/DL — SIGNIFICANT CHANGE UP (ref 8.5–10.1)
CHLORIDE SERPL-SCNC: 105 MMOL/L — SIGNIFICANT CHANGE UP (ref 96–108)
CO2 SERPL-SCNC: 27 MMOL/L — SIGNIFICANT CHANGE UP (ref 22–31)
CREAT SERPL-MCNC: 1.84 MG/DL — HIGH (ref 0.5–1.3)
EGFR: 35 ML/MIN/1.73M2 — LOW
GLUCOSE SERPL-MCNC: 110 MG/DL — HIGH (ref 70–99)
HCT VFR BLD CALC: 34.9 % — LOW (ref 39–50)
HGB BLD-MCNC: 10.8 G/DL — LOW (ref 13–17)
MCHC RBC-ENTMCNC: 28.4 PG — SIGNIFICANT CHANGE UP (ref 27–34)
MCHC RBC-ENTMCNC: 30.9 GM/DL — LOW (ref 32–36)
MCV RBC AUTO: 91.8 FL — SIGNIFICANT CHANGE UP (ref 80–100)
PLATELET # BLD AUTO: 213 K/UL — SIGNIFICANT CHANGE UP (ref 150–400)
POTASSIUM SERPL-MCNC: 4.1 MMOL/L — SIGNIFICANT CHANGE UP (ref 3.5–5.3)
POTASSIUM SERPL-SCNC: 4.1 MMOL/L — SIGNIFICANT CHANGE UP (ref 3.5–5.3)
RBC # BLD: 3.8 M/UL — LOW (ref 4.2–5.8)
RBC # FLD: 17 % — HIGH (ref 10.3–14.5)
SODIUM SERPL-SCNC: 138 MMOL/L — SIGNIFICANT CHANGE UP (ref 135–145)
WBC # BLD: 7.61 K/UL — SIGNIFICANT CHANGE UP (ref 3.8–10.5)
WBC # FLD AUTO: 7.61 K/UL — SIGNIFICANT CHANGE UP (ref 3.8–10.5)

## 2023-02-15 PROCEDURE — 93018 CV STRESS TEST I&R ONLY: CPT

## 2023-02-15 PROCEDURE — 99233 SBSQ HOSP IP/OBS HIGH 50: CPT

## 2023-02-15 PROCEDURE — 93016 CV STRESS TEST SUPVJ ONLY: CPT

## 2023-02-15 PROCEDURE — 78452 HT MUSCLE IMAGE SPECT MULT: CPT | Mod: 26

## 2023-02-15 PROCEDURE — 99239 HOSP IP/OBS DSCHRG MGMT >30: CPT

## 2023-02-15 RX ORDER — FUROSEMIDE 40 MG
1 TABLET ORAL
Qty: 30 | Refills: 0
Start: 2023-02-15 | End: 2023-03-16

## 2023-02-15 RX ORDER — REGADENOSON 0.08 MG/ML
0.4 INJECTION, SOLUTION INTRAVENOUS ONCE
Refills: 0 | Status: COMPLETED | OUTPATIENT
Start: 2023-02-15 | End: 2023-02-15

## 2023-02-15 RX ORDER — METOPROLOL TARTRATE 50 MG
1 TABLET ORAL
Qty: 60 | Refills: 0
Start: 2023-02-15 | End: 2023-03-16

## 2023-02-15 RX ORDER — ASPIRIN/CALCIUM CARB/MAGNESIUM 324 MG
1 TABLET ORAL
Qty: 0 | Refills: 0 | DISCHARGE
Start: 2023-02-15

## 2023-02-15 RX ORDER — LEVOTHYROXINE SODIUM 125 MCG
1 TABLET ORAL
Qty: 0 | Refills: 0 | DISCHARGE
Start: 2023-02-15

## 2023-02-15 RX ORDER — CHOLECALCIFEROL (VITAMIN D3) 125 MCG
1 CAPSULE ORAL
Qty: 0 | Refills: 0 | DISCHARGE

## 2023-02-15 RX ORDER — ASPIRIN/CALCIUM CARB/MAGNESIUM 324 MG
1 TABLET ORAL
Qty: 0 | Refills: 0 | DISCHARGE

## 2023-02-15 RX ORDER — LEVOTHYROXINE SODIUM 125 MCG
1 TABLET ORAL
Qty: 0 | Refills: 0 | DISCHARGE

## 2023-02-15 RX ORDER — FUROSEMIDE 40 MG
1 TABLET ORAL
Qty: 0 | Refills: 0 | DISCHARGE

## 2023-02-15 RX ORDER — METOPROLOL TARTRATE 50 MG
0 TABLET ORAL
Qty: 60 | Refills: 0
Start: 2023-02-15 | End: 2023-03-16

## 2023-02-15 RX ORDER — ATENOLOL 25 MG/1
1 TABLET ORAL
Qty: 0 | Refills: 0 | DISCHARGE

## 2023-02-15 RX ORDER — CHOLECALCIFEROL (VITAMIN D3) 125 MCG
1000 CAPSULE ORAL
Qty: 0 | Refills: 0 | DISCHARGE
Start: 2023-02-15

## 2023-02-15 RX ADMIN — Medication 50 MILLIGRAM(S): at 10:14

## 2023-02-15 RX ADMIN — Medication 81 MILLIGRAM(S): at 10:13

## 2023-02-15 RX ADMIN — Medication 150 MICROGRAM(S): at 05:35

## 2023-02-15 RX ADMIN — REGADENOSON 0.4 MILLIGRAM(S): 0.08 INJECTION, SOLUTION INTRAVENOUS at 09:10

## 2023-02-15 RX ADMIN — Medication 40 MILLIGRAM(S): at 10:13

## 2023-02-15 RX ADMIN — Medication 1000 UNIT(S): at 10:13

## 2023-02-15 RX ADMIN — FINASTERIDE 5 MILLIGRAM(S): 5 TABLET, FILM COATED ORAL at 10:13

## 2023-02-15 NOTE — DISCHARGE NOTE NURSING/CASE MANAGEMENT/SOCIAL WORK - PATIENT PORTAL LINK FT
You can access the FollowMyHealth Patient Portal offered by [facility] by registering at the following website: [email or web address]. By joining [facility]’s FollowMyHealth portal, you will also be able to view your health information using other applications (apps) compatible with our system.

## 2023-02-15 NOTE — DISCHARGE NOTE NURSING/CASE MANAGEMENT/SOCIAL WORK - NSDCFUADDAPPT_GEN_ALL_CORE_FT
PCP- follow up in 1 week. Bring these papers with you to that appointment so your PCP can request records from your hospital stay. PCP- follow up in 1 week. Bring these papers with you to that appointment so your PCP can request records from your hospital stay.  CHF FOLLOW UP MADE 2/22/23 WITH DOCTOR PALLA AT 11AM  PLEASE CALL IF NEED TO RESCHEDULE [phone]

## 2023-02-15 NOTE — PROGRESS NOTE ADULT - NS ATTEND AMEND GEN_ALL_CORE FT
Patient with nose bleed with black stool on chronic anticoagulation ,  noted to have elevated troponin , elevated BNP ,  his echo showed significantly reduced EF with severe pulmonary hypertension ,moderate MR , severe TR ,  ( new changes compared to echo 2015 )     patient had prior hx smoking ,  decreased EF may be related afib ?? underlying CAD , unclear duration , change atenolol to metoprolol succinate 50 mg po BID , ecotrin , ischemic work up
agree w/ above.
Plan of care reviewed with NP. Agree with plan of care.

## 2023-02-15 NOTE — CHART NOTE - NSCHARTNOTEFT_GEN_A_CORE
Regadenason Stress Test Report    CHINA HOWE 87yM  MRN: [id number]  : 36  Admit: 23    Regadenason was 5 cc (0.4 mg Regadenason) which was given rapidly over 10 seconds  into a peripheral IV.  Immediately after regadenson injection, flush w/ 5 cc saline given.  Radiopharmeceutical was injected 10-20 sec after the saline flush.  Pt was monitored for 6 minutes.  A 12 lead ECG was recorded every minute & BP was recorded throughout the test.    Resting ECG: Afib  Peak infusion ECG: No additional changes noted.  Chest pain: none    Conclusion:  Normal response to IV lexiscan.  See myocardial perfusion scan report.

## 2023-02-15 NOTE — PROGRESS NOTE ADULT - PROBLEM SELECTOR PLAN 1
-mildly elevated likely due to demand ischemia, not ACS.  Pt without complaints of chest pain or shortness of breath.  Nuclear stress test: global hypokinesia, and fixed perfusion defects, No scan evidence of reversible perfusion defects.  - change atenolol to toprol xl 50 mg po daily on discharge -  can titrate up outpatient         pt. stable for discharge home, follow up with cardiologist - Dianne Tan at [facility] in one week Patient with nose bleed with black stool on chronic anticoagulation ,  noted to have elevated troponin , elevated BNP ,  his echo showed significantly reduced EF with severe pulmonary hypertension ,moderate MR , severe TR ,  ( new changes compared to echo 2015 )     patient had prior hx smoking ,  decreased EF may be related afib ?? underlying CAD , unclear duration  Nuclear stress test: global hypokinesia, and fixed perfusion defects, No scan evidence of reversible perfusion defects.  - changed atenolol to toprol xl 50 mg po bid         pt. stable for discharge home, follow up with cardiologist - Dianne Tan at [facility] in one week Patient with nose bleed with black stool on chronic anticoagulation ,  noted to have elevated troponin , elevated BNP ,  his echo showed significantly reduced EF with severe pulmonary hypertension ,moderate MR , severe TR ,  ( new changes compared to echo 2015 )     patient had prior hx smoking ,  decreased EF may be related afib ?? underlying CAD , unclear duration  Nuclear stress test: global hypokinesia, and fixed perfusion defects, No scan evidence of reversible perfusion defects.  - changed atenolol to toprol xl 50 mg po bid     can discharge from cardiology standpoint, follow up with cardiology at [facility]       pt. stable for discharge home, follow up with cardiologist - Dianne Tan at [facility] in one week

## 2023-02-15 NOTE — PROGRESS NOTE ADULT - ASSESSMENT
88 y/o male with Hx. of Atrial fib (On Xarelto), Multiple episodes of epistaxis, CHF, CAD s/p stents who presented in the ED for black stool. His symptoms started 2 days ago and got worse last night. He also had nose bleed from R nostril but it stopped. He had multiple ER visits for epistaxis in the past, had ENT f/u and cauterization done by Dr. Jefferson.  The patient had COVID19 infection/CHF exacerbation last December.     Melena  Resolved. Unlikely GI bleed, GI eval appreciated, suspected due to epistaxis /swallowing blood  - No further workup at this time  - H&H stable  - ASA and Xarelto initially held, d/w GI/Cardio resume Xarelto only and monitor  - GI f/u appreciated    Elevated troponin   Hx CAD s/p stents  Likely demand ischemia, unlikely ACS  - Monitor on tele, noted with afib/flutter  - TroponinI hsT: <-139.53, <-158.73 (downtrended)  - 2/13/23: TTE: Severe pulmonary hypertension. EF 35-40 %  - D/C atenolol, started Metoprolol  - C/w atorvastatin  2/15/23: NMST for today, results pending if (-) cleared for D/C     hx CHF (New systolic dysfunction)   Unlikely HF exacerbation  - 02 sats stable on room air  - CXR w/ pulmonary congestion   - 2/13/23: TTE: Severe pulmonary hypertension. EF 35-40 % ---- Echo in 2015 reveals normal LVF, no wall motion abnormalities  - C/w PO Lasix and metoprolol     YUE  Unknown Baseline, sCr stable  - s/p IVF, monitor  - Outpatient f/u with PCP    Mild Anemia  Likely from acute blood loss/ epistaxis, unlikely GI bleed  - Monitor H&H, stable after resuming Xarelto on 2/13    Permanent Afib/flutter  Chronic and stable  - Xarelto 15mg w/ dinner and metoprolol    GOC / Advanced Directives  - Will have further discussion regarding Advanced Directives, in the process of those decisions since his hospitalization in December. As of now Full Code.    Updated Hal (son) on 2/13, 2/14    Dispo: If NMST (-), plan to DC home with assistance/HC   86 y/o male with Hx. of Atrial fib (On Xarelto), Multiple episodes of epistaxis, CHF, CAD s/p stents who presented in the ED for black stool. His symptoms started 2 days ago and got worse last night. He also had nose bleed from R nostril but it stopped. He had multiple ER visits for epistaxis in the past, had ENT f/u and cauterization done by Dr. Jefferson.  The patient had COVID19 infection/CHF exacerbation last December.     Melena  Resolved. Unlikely GI bleed, GI eval appreciated, suspected due to epistaxis /swallowing blood  - No further workup at this time  - H&H stable  - ASA and Xarelto initially held, d/w GI/Cardio resumed both and H&H stable  - GI f/u appreciated    Elevated troponin. Hx CAD s/p stents  Likely demand ischemia  - Monitored on tele, noted with afib/flutter  - TroponinI hsT: <-139.53, <-158.73 (downtrended)  - 2/13/23: TTE: Severe pulmonary hypertension. EF 35-40 %  - D/C atenolol, started Metoprolol  - C/w atorvastatin, aspirin, Eliquis  - 2/15/23: NMST (-) for reversible defects. EF 35%  - Close outpatient f/u with cardiology - Dr. Tan at [facility]    hx CHF (New systolic dysfunction)   Unlikely HF exacerbation, ?afib related, known CAD w/ stents. NMST (-)  - 02 sats stable on room air  - CXR w/ pulmonary congestion   - 2/13/23: TTE: Severe pulmonary hypertension. EF 35-40 % ---- Echo in 2015 reveals normal LVF, no wall motion abnormalities  - C/w PO Lasix (reduced to 20mg) and metoprolol     YUE  Unknown Baseline, sCr stable  - s/p IVF, monitor  - Outpatient f/u with PCP for repeat labs    Mild Anemia  Likely from acute blood loss/ epistaxis, unlikely GI bleed  - Monitor H&H, stable after resuming Xarelto/ASA    Permanent Afib/flutter  Chronic and stable  - Xarelto 15mg w/ dinner and metoprolol    GOC / Advanced Directives  - Will have further discussion regarding Advanced Directives, in the process of those decisions since his hospitalization in December. As of now Full Code.    Updated Hal (son) on 2/13, 2/14, 2/15    Dispo: discharge to *HOME w/ FTF* in stable condition

## 2023-02-15 NOTE — PROGRESS NOTE ADULT - SUBJECTIVE AND OBJECTIVE BOX
HPI: The patient is an 86 yo male with Hx. of Atrial fib. on Xarelto, Multiple episodes of epistaxis, CHF,CAD s/p stents who presented  in the ED for  black stool. His symptoms started 2 days ago and got worse last night. He also had nose bleed from R nostril but it stopped. He had multiple ER visits for epistaxis in the past, had ENT f/u and cauterization done by Dr. Jefferson.  The patient had COVID 19 infection,   CHF exacerbation last December.   Cardiology consulted for CHF and elevated trops. Pt. admitted for GI bleed. Also w/ trop elevation, hospitalist suspects CHF. Pt denies chest pain, dyspnea, LE edema. No cardiac symptoms.    2//13/23: no complaints, Tele: Afluttter 70s, 40s when sleeping, Echo pending   2/14/23: Pt sitting up in chair. Denies cp/sob/palpitations. Denies further bleeding.   Tele: aflutter 60's-90's   2/15/23: no complaints, Tele; Aflutter 70s, Echo reduced EF, severe pHTN, mod to severe TR,  Nuclear stress test: no reversible defects       MEDICATIONS:  OUTPATIENT:  Home Medications:  Aspir 81 oral delayed release tablet: 1 tab(s) orally once a day (12 Feb 2023 16:44)  atenolol 100 mg oral tablet: 1 tab(s) orally once a day (12 Feb 2023 16:44)  finasteride 5 mg oral tablet: 1 tab(s) orally once a day (12 Feb 2023 16:44)  furosemide 40 mg oral tablet: 1 tab(s) orally once a day (12 Feb 2023 16:44)  levothyroxine 150 mcg (0.15 mg) oral tablet: 1 tab(s) orally once a day (12 Feb 2023 16:44)  pravastatin 40 mg oral tablet: 1 tab(s) orally once a day (12 Feb 2023 16:44)  Vitamin D3 25 mcg (1000 intl units) oral tablet: 1 tab(s) orally once a day (12 Feb 2023 16:44)  Xarelto 15 mg oral tablet: 1 tab(s) orally once a day (in the evening) (12 Feb 2023 16:44)    MEDICATIONS  (STANDING):  atenolol  Tablet 100 milliGRAM(s) Oral daily  atorvastatin 20 milliGRAM(s) Oral at bedtime  cholecalciferol 1000 Unit(s) Oral daily  finasteride 5 milliGRAM(s) Oral daily  furosemide    Tablet 40 milliGRAM(s) Oral daily  levothyroxine 150 MICROGram(s) Oral daily  rivaroxaban 15 milliGRAM(s) Oral with dinner    MEDICATIONS  (PRN):  acetaminophen     Tablet .. 650 milliGRAM(s) Oral every 6 hours PRN Temp greater or equal to 38C (100.4F), Mild Pain (1 - 3)  LORazepam     Tablet 0.25 milliGRAM(s) Oral at bedtime PRN Anxiety  melatonin 3 milliGRAM(s) Oral at bedtime PRN Insomnia  ondansetron Injectable 4 milliGRAM(s) IV Push every 8 hours PRN Nausea and/or Vomiting    Vital Signs Last 24 Hrs  T(C): 36.6 (13 Feb 2023 20:40), Max: 36.6 (13 Feb 2023 20:40)  T(F): 97.8 (13 Feb 2023 20:40), Max: 97.8 (13 Feb 2023 20:40)  HR: 80 (13 Feb 2023 20:40) (80 - 80)  BP: 121/67 (13 Feb 2023 20:40) (121/67 - 121/67)  BP(mean): --  RR: 18 (13 Feb 2023 20:40) (18 - 18)  SpO2: 93% (13 Feb 2023 20:40) (93% - 93%)    Parameters below as of 13 Feb 2023 20:40  Patient On (Oxygen Delivery Method): room air    PHYSICAL EXAM:    Constitutional: NAD, awake and alert,   HEENT: PERR, EOMI,  No oral cyananosis.  Neck:  supple,  No JVD  Respiratory: Breath sounds are clear bilaterally, No wheezing, rales or rhonchi  Cardiovascular: S1 and S2, regular rate and rhythm, no Murmurs, gallops or rubs  Gastrointestinal: Bowel Sounds present, soft, nontender.   Extremities: No peripheral edema. No clubbing or cyanosis.  Vascular: 2+ peripheral pulses  Neurological: A/O x 3, no focal deficits  Musculoskeletal: no calf tenderness.  Skin: No rashes.    ===============================  ===============================  LABS:                           11.0   7.13  )-----------( 205      ( 14 Feb 2023 06:26 )             34.7                           10.4   6.86  )-----------( 211      ( 13 Feb 2023 06:33 )             33.8     02-14    138  |  105  |  48<H>  ----------------------------<  102<H>  4.3   |  27  |  1.71<H>    Ca    9.1      14 Feb 2023 06:26    TPro  7.3  /  Alb  3.3  /  TBili  0.9  /  DBili  x   /  AST  20  /  ALT  12  /  AlkPhos  80  02-12      02-13    138  |  105  |  48<H>  ----------------------------<  108<H>  4.0   |  27  |  1.43<H>    Ca    8.8      13 Feb 2023 06:33    TPro  7.3  /  Alb  3.3  /  TBili  0.9  /  DBili  x   /  AST  20  /  ALT  12  /  AlkPhos  80  02-12    - TroponinI hsT: <-139.53, <-158.73                        11.0   6.77  )-----------( 223      ( 12 Feb 2023 11:44 )             34.4     12 Feb 2023 11:44    136    |  102    |  56     ----------------------------<  106    3.9     |  29     |  1.74     Ca    9.0        12 Feb 2023 11:44    TPro  7.3    /  Alb  3.3    /  TBili  0.9    /  DBili  x      /  AST  20     /  ALT  12     /  AlkPhos  80     12 Feb 2023 11:44        THYROID STUDIES:    ===============================  ===============================  CARDIAC BIOMARKERS:  -------      -BNP VALUES:  02-12 @ 11:44  Pro Bnp [id number]    -------  -TROPONIN VALUES:   Troponin I, High Sensitivity Result: 158.73 ng/L *H* (02-12-23 @ 11:44)    ===============================  ===============================  EKG:  extensive artifact  possible accelerated junctional v. sinus  no ischemic changes. PRWP transitions V6, Qs III, aVF  ===============================  RADIOLOGY:  CXR prelim. - possible mild pul vascular congestion.    < from: TTE Echo Complete w/o Contrast w/ Doppler (02.13.23 @ 15:41) >   Impression     Summary     There is calcification of anterior mitral valve leaflet. The leaflet   opening is normal.   Mild mitral annular calcification is present.   Moderate (2+) mitral regurgitation is present.   EA reversal of themitral inflow consistent with reduced compliance of   the   left ventricle.   The aortic valve is well visualized, appears mildly calcified. Valve   opening seems to be normal.   Mild to Moderate aortic regurgitation is present.   Normal appearing tricuspid valve structure.   Moderate (2+) to severe eccentric tricuspid valve regurgitation is   present.   Severe pulmonary hypertension.   Estimated left ventricular ejection fraction is 35-40 %.   The left ventricle is normal in size.   Mild concentric left ventricular hypertrophy is present.   Moderate, diffuse hypokinesis of the left ventricle is present.   The left atrium is moderately dilated.   The right atrium appears moderately dilated.   Normal appearing right ventricle structure and function.   IVC is dilated and not collapsing with inspiration.     Signature      < end of copied text >    < from: NM Nuclear Stress Pharmacologic Multiple (02.15.23 @ 10:24) >    IMPRESSION: Abnormal SPECT Myocardial Perfusion Imaging.    Mildly dilated left ventricle with global hypokinesia and fixed perfusion   defects in the apical wall, apical segments of the lateral and   anteroseptal wall and most segments of the inferior wall of the left   ventricle.    No scan evidence of reversible perfusion defects.    Reduced left ventricular contractility with an ejection fraction of 34%   at rest (Normal: 50% or greater).    < end of copied text >           HPI: The patient is an 86 yo male with Hx. of Atrial fib. on Xarelto, Multiple episodes of epistaxis, CHF,CAD s/p stents who presented  in the ED for  black stool. His symptoms started 2 days ago and got worse last night. He also had nose bleed from R nostril but it stopped. He had multiple ER visits for epistaxis in the past, had ENT f/u and cauterization done by Dr. Jefferson.  The patient had COVID 19 infection,   CHF exacerbation last December.   Cardiology consulted for CHF and elevated trops. Pt. admitted for GI bleed. Also w/ trop elevation, hospitalist suspects CHF. Pt denies chest pain, dyspnea, LE edema. No cardiac symptoms.    2//13/23: no complaints, Tele: Afluttter 70s, 40s when sleeping, Echo pending   2/14/23: Pt sitting up in chair. Denies cp/sob/palpitations. Denies further bleeding.   Tele: aflutter 60's-90's   2/15/23: no complaints, Tele; Aflutter 70s, Echo reduced EF, severe pHTN, mod to severe TR,  Nuclear stress test: no reversible defects       MEDICATIONS:  OUTPATIENT:  Home Medications:  Aspir 81 oral delayed release tablet: 1 tab(s) orally once a day (12 Feb 2023 16:44)  atenolol 100 mg oral tablet: 1 tab(s) orally once a day (12 Feb 2023 16:44)  finasteride 5 mg oral tablet: 1 tab(s) orally once a day (12 Feb 2023 16:44)  furosemide 40 mg oral tablet: 1 tab(s) orally once a day (12 Feb 2023 16:44)  levothyroxine 150 mcg (0.15 mg) oral tablet: 1 tab(s) orally once a day (12 Feb 2023 16:44)  pravastatin 40 mg oral tablet: 1 tab(s) orally once a day (12 Feb 2023 16:44)  Vitamin D3 25 mcg (1000 intl units) oral tablet: 1 tab(s) orally once a day (12 Feb 2023 16:44)  Xarelto 15 mg oral tablet: 1 tab(s) orally once a day (in the evening) (12 Feb 2023 16:44)    MEDICATIONS  (STANDING):  aspirin enteric coated 81 milliGRAM(s) Oral daily  atorvastatin 20 milliGRAM(s) Oral at bedtime  cholecalciferol 1000 Unit(s) Oral daily  finasteride 5 milliGRAM(s) Oral daily  furosemide    Tablet 40 milliGRAM(s) Oral daily  levothyroxine 150 MICROGram(s) Oral daily  metoprolol succinate ER 50 milliGRAM(s) Oral two times a day  rivaroxaban 15 milliGRAM(s) Oral with dinner      MEDICATIONS  (PRN):  acetaminophen     Tablet .. 650 milliGRAM(s) Oral every 6 hours PRN Temp greater or equal to 38C (100.4F), Mild Pain (1 - 3)  LORazepam     Tablet 0.25 milliGRAM(s) Oral at bedtime PRN Anxiety  melatonin 3 milliGRAM(s) Oral at bedtime PRN Insomnia  ondansetron Injectable 4 milliGRAM(s) IV Push every 8 hours PRN Nausea and/or Vomiting    Vital Signs Last 24 Hrs  T(C): 36.6 (13 Feb 2023 20:40), Max: 36.6 (13 Feb 2023 20:40)  T(F): 97.8 (13 Feb 2023 20:40), Max: 97.8 (13 Feb 2023 20:40)  HR: 80 (13 Feb 2023 20:40) (80 - 80)  BP: 121/67 (13 Feb 2023 20:40) (121/67 - 121/67)  BP(mean): --  RR: 18 (13 Feb 2023 20:40) (18 - 18)  SpO2: 93% (13 Feb 2023 20:40) (93% - 93%)    Parameters below as of 13 Feb 2023 20:40  Patient On (Oxygen Delivery Method): room air    PHYSICAL EXAM:    Constitutional: NAD, awake and alert,   HEENT: PERR, EOMI,  No oral cyananosis.  Neck:  supple,  No JVD  Respiratory: Breath sounds are clear bilaterally, No wheezing, rales or rhonchi  Cardiovascular: S1 and S2, regular rate and rhythm, no Murmurs, gallops or rubs  Gastrointestinal: Bowel Sounds present, soft, nontender.   Extremities: No peripheral edema. No clubbing or cyanosis.  Vascular: 2+ peripheral pulses  Neurological: A/O x 3, no focal deficits  Musculoskeletal: no calf tenderness.  Skin: No rashes.    ===============================  ===============================  LABS:                           11.0   7.13  )-----------( 205      ( 14 Feb 2023 06:26 )             34.7                           10.4   6.86  )-----------( 211      ( 13 Feb 2023 06:33 )             33.8     02-14    138  |  105  |  48<H>  ----------------------------<  102<H>  4.3   |  27  |  1.71<H>    Ca    9.1      14 Feb 2023 06:26    TPro  7.3  /  Alb  3.3  /  TBili  0.9  /  DBili  x   /  AST  20  /  ALT  12  /  AlkPhos  80  02-12      02-13    138  |  105  |  48<H>  ----------------------------<  108<H>  4.0   |  27  |  1.43<H>    Ca    8.8      13 Feb 2023 06:33    TPro  7.3  /  Alb  3.3  /  TBili  0.9  /  DBili  x   /  AST  20  /  ALT  12  /  AlkPhos  80  02-12    - TroponinI hsT: <-139.53, <-158.73                        11.0   6.77  )-----------( 223      ( 12 Feb 2023 11:44 )             34.4     12 Feb 2023 11:44    136    |  102    |  56     ----------------------------<  106    3.9     |  29     |  1.74     Ca    9.0        12 Feb 2023 11:44    TPro  7.3    /  Alb  3.3    /  TBili  0.9    /  DBili  x      /  AST  20     /  ALT  12     /  AlkPhos  80     12 Feb 2023 11:44        THYROID STUDIES:    ===============================  ===============================  CARDIAC BIOMARKERS:  -------      -BNP VALUES:  02-12 @ 11:44  Pro Bnp [id number]    -------  -TROPONIN VALUES:   Troponin I, High Sensitivity Result: 158.73 ng/L *H* (02-12-23 @ 11:44)    ===============================  ===============================  EKG:  extensive artifact  possible accelerated junctional v. sinus  no ischemic changes. PRWP transitions V6, Qs III, aVF  ===============================  RADIOLOGY:  CXR prelim. - possible mild pul vascular congestion.    < from: TTE Echo Complete w/o Contrast w/ Doppler (02.13.23 @ 15:41) >   Impression     Summary     There is calcification of anterior mitral valve leaflet. The leaflet   opening is normal.   Mild mitral annular calcification is present.   Moderate (2+) mitral regurgitation is present.   EA reversal of themitral inflow consistent with reduced compliance of   the   left ventricle.   The aortic valve is well visualized, appears mildly calcified. Valve   opening seems to be normal.   Mild to Moderate aortic regurgitation is present.   Normal appearing tricuspid valve structure.   Moderate (2+) to severe eccentric tricuspid valve regurgitation is   present.   Severe pulmonary hypertension.   Estimated left ventricular ejection fraction is 35-40 %.   The left ventricle is normal in size.   Mild concentric left ventricular hypertrophy is present.   Moderate, diffuse hypokinesis of the left ventricle is present.   The left atrium is moderately dilated.   The right atrium appears moderately dilated.   Normal appearing right ventricle structure and function.   IVC is dilated and not collapsing with inspiration.     Signature      < end of copied text >    < from: NM Nuclear Stress Pharmacologic Multiple (02.15.23 @ 10:24) >    IMPRESSION: Abnormal SPECT Myocardial Perfusion Imaging.    Mildly dilated left ventricle with global hypokinesia and fixed perfusion   defects in the apical wall, apical segments of the lateral and   anteroseptal wall and most segments of the inferior wall of the left   ventricle.    No scan evidence of reversible perfusion defects.    Reduced left ventricular contractility with an ejection fraction of 34%   at rest (Normal: 50% or greater).    < end of copied text >           HPI: The patient is an 88 yo male with Hx. of Atrial fib. on Xarelto, Multiple episodes of epistaxis, CHF,CAD s/p stents who presented  in the ED for  black stool. His symptoms started 2 days ago and got worse last night. He also had nose bleed from R nostril but it stopped. He had multiple ER visits for epistaxis in the past, had ENT f/u and cauterization done by Dr. Jefferson.  The patient had COVID 19 infection,   CHF exacerbation last December.   Cardiology consulted for CHF and elevated trops. Pt. admitted for GI bleed. Also w/ trop elevation, hospitalist suspects CHF. Pt denies chest pain, dyspnea, LE edema. No cardiac symptoms.    2//13/23: no complaints, Tele: Afluttter 70s, 40s when sleeping, Echo pending   2/14/23: Pt sitting up in chair. Denies cp/sob/palpitations. Denies further bleeding.   Tele: aflutter 60's-90's   2/15/23: no complaints, Tele; Aflutter 70s, Echo reduced EF, severe pHTN, mod to severe TR,  Nuclear stress test: no reversible defects, stable for discharge, follow up with his cardiologist at [facility]       MEDICATIONS:  OUTPATIENT:  Home Medications:  Aspir 81 oral delayed release tablet: 1 tab(s) orally once a day (12 Feb 2023 16:44)  atenolol 100 mg oral tablet: 1 tab(s) orally once a day (12 Feb 2023 16:44)  finasteride 5 mg oral tablet: 1 tab(s) orally once a day (12 Feb 2023 16:44)  furosemide 40 mg oral tablet: 1 tab(s) orally once a day (12 Feb 2023 16:44)  levothyroxine 150 mcg (0.15 mg) oral tablet: 1 tab(s) orally once a day (12 Feb 2023 16:44)  pravastatin 40 mg oral tablet: 1 tab(s) orally once a day (12 Feb 2023 16:44)  Vitamin D3 25 mcg (1000 intl units) oral tablet: 1 tab(s) orally once a day (12 Feb 2023 16:44)  Xarelto 15 mg oral tablet: 1 tab(s) orally once a day (in the evening) (12 Feb 2023 16:44)    MEDICATIONS  (STANDING):  aspirin enteric coated 81 milliGRAM(s) Oral daily  atorvastatin 20 milliGRAM(s) Oral at bedtime  cholecalciferol 1000 Unit(s) Oral daily  finasteride 5 milliGRAM(s) Oral daily  furosemide    Tablet 40 milliGRAM(s) Oral daily  levothyroxine 150 MICROGram(s) Oral daily  metoprolol succinate ER 50 milliGRAM(s) Oral two times a day  rivaroxaban 15 milliGRAM(s) Oral with dinner      MEDICATIONS  (PRN):  acetaminophen     Tablet .. 650 milliGRAM(s) Oral every 6 hours PRN Temp greater or equal to 38C (100.4F), Mild Pain (1 - 3)  LORazepam     Tablet 0.25 milliGRAM(s) Oral at bedtime PRN Anxiety  melatonin 3 milliGRAM(s) Oral at bedtime PRN Insomnia  ondansetron Injectable 4 milliGRAM(s) IV Push every 8 hours PRN Nausea and/or Vomiting    Vital Signs Last 24 Hrs  T(C): 36.6 (13 Feb 2023 20:40), Max: 36.6 (13 Feb 2023 20:40)  T(F): 97.8 (13 Feb 2023 20:40), Max: 97.8 (13 Feb 2023 20:40)  HR: 80 (13 Feb 2023 20:40) (80 - 80)  BP: 121/67 (13 Feb 2023 20:40) (121/67 - 121/67)  BP(mean): --  RR: 18 (13 Feb 2023 20:40) (18 - 18)  SpO2: 93% (13 Feb 2023 20:40) (93% - 93%)    Parameters below as of 13 Feb 2023 20:40  Patient On (Oxygen Delivery Method): room air    PHYSICAL EXAM:    Constitutional: NAD, awake and alert,   HEENT: PERR, EOMI,  No oral cyananosis.  Neck:  supple,  No JVD  Respiratory: Breath sounds are clear bilaterally, No wheezing, rales or rhonchi  Cardiovascular: S1 and S2, regular rate and rhythm, no Murmurs, gallops or rubs  Gastrointestinal: Bowel Sounds present, soft, nontender.   Extremities: No peripheral edema. No clubbing or cyanosis.  Vascular: 2+ peripheral pulses  Neurological: A/O x 3, no focal deficits  Musculoskeletal: no calf tenderness.  Skin: No rashes.    ===============================  ===============================  LABS:                           11.0   7.13  )-----------( 205      ( 14 Feb 2023 06:26 )             34.7                           10.4   6.86  )-----------( 211      ( 13 Feb 2023 06:33 )             33.8     02-14    138  |  105  |  48<H>  ----------------------------<  102<H>  4.3   |  27  |  1.71<H>    Ca    9.1      14 Feb 2023 06:26    TPro  7.3  /  Alb  3.3  /  TBili  0.9  /  DBili  x   /  AST  20  /  ALT  12  /  AlkPhos  80  02-12 02-13    138  |  105  |  48<H>  ----------------------------<  108<H>  4.0   |  27  |  1.43<H>    Ca    8.8      13 Feb 2023 06:33    TPro  7.3  /  Alb  3.3  /  TBili  0.9  /  DBili  x   /  AST  20  /  ALT  12  /  AlkPhos  80  02-12    - TroponinI hsT: <-139.53, <-158.73                        11.0   6.77  )-----------( 223      ( 12 Feb 2023 11:44 )             34.4     12 Feb 2023 11:44    136    |  102    |  56     ----------------------------<  106    3.9     |  29     |  1.74     Ca    9.0        12 Feb 2023 11:44    TPro  7.3    /  Alb  3.3    /  TBili  0.9    /  DBili  x      /  AST  20     /  ALT  12     /  AlkPhos  80     12 Feb 2023 11:44        THYROID STUDIES:    ===============================  ===============================  CARDIAC BIOMARKERS:  -------      -BNP VALUES:  02-12 @ 11:44  Pro Bnp [id number]    -------  -TROPONIN VALUES:   Troponin I, High Sensitivity Result: 158.73 ng/L *H* (02-12-23 @ 11:44)    ===============================  ===============================  EKG:  extensive artifact  possible accelerated junctional v. sinus  no ischemic changes. PRWP transitions V6, Qs III, aVF  ===============================  RADIOLOGY:  CXR prelim. - possible mild pul vascular congestion.    < from: TTE Echo Complete w/o Contrast w/ Doppler (02.13.23 @ 15:41) >   Impression     Summary     There is calcification of anterior mitral valve leaflet. The leaflet   opening is normal.   Mild mitral annular calcification is present.   Moderate (2+) mitral regurgitation is present.   EA reversal of themitral inflow consistent with reduced compliance of   the   left ventricle.   The aortic valve is well visualized, appears mildly calcified. Valve   opening seems to be normal.   Mild to Moderate aortic regurgitation is present.   Normal appearing tricuspid valve structure.   Moderate (2+) to severe eccentric tricuspid valve regurgitation is   present.   Severe pulmonary hypertension.   Estimated left ventricular ejection fraction is 35-40 %.   The left ventricle is normal in size.   Mild concentric left ventricular hypertrophy is present.   Moderate, diffuse hypokinesis of the left ventricle is present.   The left atrium is moderately dilated.   The right atrium appears moderately dilated.   Normal appearing right ventricle structure and function.   IVC is dilated and not collapsing with inspiration.     Signature      < end of copied text >    < from: NM Nuclear Stress Pharmacologic Multiple (02.15.23 @ 10:24) >    IMPRESSION: Abnormal SPECT Myocardial Perfusion Imaging.    Mildly dilated left ventricle with global hypokinesia and fixed perfusion   defects in the apical wall, apical segments of the lateral and   anteroseptal wall and most segments of the inferior wall of the left   ventricle.    No scan evidence of reversible perfusion defects.    Reduced left ventricular contractility with an ejection fraction of 34%   at rest (Normal: 50% or greater).    < end of copied text >

## 2023-02-15 NOTE — PROGRESS NOTE ADULT - SUBJECTIVE AND OBJECTIVE BOX
Chief Complaint: GI bleed, CHF    Interval Events/ROS/Subjective:  2/13/23: Patient seen and examined. No cp or dyspnea. Feeling well. Resuming Xarelto, monitor H&H. PT destiney, case d/w GI and cardio  2/14/23: Patient seen and examined. No cp or dyspnea. Feeling well. RT eye redness, patient states chronic draining eye  2/15/23: Feeling well, some constipation. no cp or sob. Comfortable   All other review of systems is negative unless indicated above    Physical Exam:  T(C): 36.5 (15 Feb 2023 08:51), Max: 36.7 (14 Feb 2023 20:46)  T(F): 97.7 (15 Feb 2023 08:51), Max: 98 (14 Feb 2023 20:46)  HR: 69 (15 Feb 2023 08:51) (69 - 71)  BP: 118/71 (15 Feb 2023 08:51) (101/62 - 118/71)  BP(mean): 81 (15 Feb 2023 08:51) (81 - 81)  RR: 18 (15 Feb 2023 08:51) (18 - 18)  SpO2: 91% (15 Feb 2023 08:51) (91% - 95%)    Parameters below as of 15 Feb 2023 08:51  Patient On (Oxygen Delivery Method): room air    Constitutional: Awake and alert  HEENT: [place], MMM, Rt eye redness/draining  Neck: Soft and supple, No LAD, No JVD  Respiratory: Breath sounds are clear bilaterally, No wheezing, rales or rhonchi  Cardiovascular: S1 and S2, regular rate and rhythm, no Murmurs, gallops or rubs  Gastrointestinal: Bowel Sounds present, soft, nontender, nondistended, no guarding, no rebound  Extremities: No peripheral edema  Vascular: 2+ peripheral pulses  Neurological: A/O x 3, forgetful  Musculoskeletal: 5/5 strength b/l upper and lower extremities  Skin: No rashes    Labs:             10.8   7.61  )-----------( 213      ( 15 Feb 2023 06:46 )             34.9     02-15    138  |  105  |  58<H>  ----------------------------<  110<H>  4.1   |  27  |  1.84<H>    Ca    9.1      15 Feb 2023 06:46    Troponin: 158.73 ng/L, Troponin: 139.53 ng/L    BNP: 78736    Micro:    2/12/23: COVID19 PCR (-)    Radiology:    2/12/23: Xray Chest 1 View: Pulmonary edema changes with left pleural effusion.    Cardiac Testing:    TroponinI hsT: <-139.53, <-158.73    2/13/23: TTE: There is calcification of anterior mitral valve leaflet. The leaflet opening is normal. Mild mitral annular calcification is present. Moderate (2+) mitral regurgitation is present. EA reversal of the mitral inflow consistent with reduced compliance of the left ventricle. The aortic valve is well visualized, appears mildly calcified. Valve opening seems to be normal. Mild to Moderate aortic regurgitation is present. Normal appearing tricuspid valve structure. Moderate (2+) to severe eccentric tricuspid valve regurgitation is present. Severe pulmonary hypertension. Estimated left ventricular ejection fraction is 35-40 %. The left ventricle is normal in size. Mild concentric left ventricular hypertrophy is present. Moderate, diffuse hypokinesis of the left ventricle is present. The left atrium is moderately dilated. The right atrium appears moderately dilated. Normal appearing right ventricle structure and function. IVC is dilated and not collapsing with inspiration.    2/12/23: EKG: Diagnosis Line *** Suspect arm lead reversal, interpretation assumes no reversal. Atrial fibrillation Low voltage QRS. Possible Inferior infarct , age undetermined. Anterolateral infarct , age undetermined. Abnormal ECG.     Telemetry, I personally reviewed:  2/14: Aflutter 60-90s     Medications:  aspirin enteric coated 81 milliGRAM(s) Oral daily  atorvastatin 20 milliGRAM(s) Oral at bedtime  cholecalciferol 1000 Unit(s) Oral daily  finasteride 5 milliGRAM(s) Oral daily  furosemide    Tablet 40 milliGRAM(s) Oral daily  levothyroxine 150 MICROGram(s) Oral daily  metoprolol succinate ER 50 milliGRAM(s) Oral two times a day  rivaroxaban 15 milliGRAM(s) Oral with dinner    MEDICATIONS  (PRN):  acetaminophen     Tablet .. 650 milliGRAM(s) Oral every 6 hours PRN Temp greater or equal to 38C (100.4F), Mild Pain (1 - 3)  LORazepam     Tablet 0.25 milliGRAM(s) Oral at bedtime PRN Anxiety  melatonin 3 milliGRAM(s) Oral at bedtime PRN Insomnia  ondansetron Injectable 4 milliGRAM(s) IV Push every 8 hours PRN Nausea and/or Vomiting      Home Medications:  Aspir 81 oral delayed release tablet: 1 tab(s) orally once a day (12 Feb 2023 16:44)  atenolol 100 mg oral tablet: 1 tab(s) orally once a day (12 Feb 2023 16:44)  finasteride 5 mg oral tablet: 1 tab(s) orally once a day (12 Feb 2023 16:44)  furosemide 40 mg oral tablet: 1 tab(s) orally once a day (12 Feb 2023 16:44)  levothyroxine 150 mcg (0.15 mg) oral tablet: 1 tab(s) orally once a day (12 Feb 2023 16:44)  pravastatin 40 mg oral tablet: 1 tab(s) orally once a day (12 Feb 2023 16:44)  Vitamin D3 25 mcg (1000 intl units) oral tablet: 1 tab(s) orally once a day (12 Feb 2023 16:44)  Xarelto 15 mg oral tablet: 1 tab(s) orally once a day (in the evening) (12 Feb 2023 16:44)   Chief Complaint: GI bleed, CHF    Interval Events/ROS/Subjective:  2/13/23: Patient seen and examined. No cp or dyspnea. Feeling well. Resuming Xarelto, monitor H&H. PT destiney, case d/w GI and cardio  2/14/23: Patient seen and examined. No cp or dyspnea. Feeling well. RT eye redness, patient states chronic draining eye  2/15/23: Feeling well, some constipation. no cp or sob. Comfortable   All other review of systems is negative unless indicated above    Physical Exam:  T(C): 36.5 (15 Feb 2023 08:51), Max: 36.7 (14 Feb 2023 20:46)  T(F): 97.7 (15 Feb 2023 08:51), Max: 98 (14 Feb 2023 20:46)  HR: 69 (15 Feb 2023 08:51) (69 - 71)  BP: 118/71 (15 Feb 2023 08:51) (101/62 - 118/71)  BP(mean): 81 (15 Feb 2023 08:51) (81 - 81)  RR: 18 (15 Feb 2023 08:51) (18 - 18)  SpO2: 91% (15 Feb 2023 08:51) (91% - 95%)    Parameters below as of 15 Feb 2023 08:51  Patient On (Oxygen Delivery Method): room air    Constitutional: Awake and alert  HEENT: [place], MMM, Rt eye redness/draining  Neck: Soft and supple, No LAD, No JVD  Respiratory: Breath sounds are clear bilaterally, No wheezing, rales or rhonchi  Cardiovascular: S1 and S2, regular rate and rhythm, no Murmurs, gallops or rubs  Gastrointestinal: Bowel Sounds present, soft, nontender, nondistended, no guarding, no rebound  Extremities: No peripheral edema  Vascular: 2+ peripheral pulses  Neurological: A/O x 3, forgetful  Musculoskeletal: 5/5 strength b/l upper and lower extremities  Skin: No rashes    Labs:             10.8   7.61  )-----------( 213      ( 15 Feb 2023 06:46 )             34.9     02-15    138  |  105  |  58<H>  ----------------------------<  110<H>  4.1   |  27  |  1.84<H>    Ca    9.1      15 Feb 2023 06:46    Troponin: 158.73 ng/L, Troponin: 139.53 ng/L    BNP: 38699    Micro:    2/12/23: COVID19 PCR (-)    Radiology:    2/12/23: Xray Chest 1 View: Pulmonary edema changes with left pleural effusion.    Cardiac Testing:    TroponinI hsT: <-139.53, <-158.73    2/15/23: NM Nuclear Stress Pharmacologic Multiple: Abnormal SPECT Myocardial Perfusion Imaging. Mildly dilated left ventricle with global hypokinesia and fixed perfusion defects in the apical wall, apical segments of the lateral and anteroseptal wall and most segments of the inferior wall of the left ventricle. No scan evidence of reversible perfusion defects. Reduced left ventricular contractility with an ejection fraction of 34% at rest (Normal: 50% or greater).    2/13/23: TTE: There is calcification of anterior mitral valve leaflet. The leaflet opening is normal. Mild mitral annular calcification is present. Moderate (2+) mitral regurgitation is present. EA reversal of the mitral inflow consistent with reduced compliance of the left ventricle. The aortic valve is well visualized, appears mildly calcified. Valve opening seems to be normal. Mild to Moderate aortic regurgitation is present. Normal appearing tricuspid valve structure. Moderate (2+) to severe eccentric tricuspid valve regurgitation is present. Severe pulmonary hypertension. Estimated left ventricular ejection fraction is 35-40 %. The left ventricle is normal in size. Mild concentric left ventricular hypertrophy is present. Moderate, diffuse hypokinesis of the left ventricle is present. The left atrium is moderately dilated. The right atrium appears moderately dilated. Normal appearing right ventricle structure and function. IVC is dilated and not collapsing with inspiration.    2/12/23: EKG: Diagnosis Line *** Suspect arm lead reversal, interpretation assumes no reversal. Atrial fibrillation Low voltage QRS. Possible Inferior infarct , age undetermined. Anterolateral infarct , age undetermined. Abnormal ECG.     Telemetry, I personally reviewed:  2/14: Aflutter 60-90s     Medications:  MEDICATIONS  (STANDING):  aspirin enteric coated 81 milliGRAM(s) Oral daily  atorvastatin 20 milliGRAM(s) Oral at bedtime  cholecalciferol 1000 Unit(s) Oral daily  finasteride 5 milliGRAM(s) Oral daily  levothyroxine 150 MICROGram(s) Oral daily  metoprolol succinate ER 50 milliGRAM(s) Oral two times a day  rivaroxaban 15 milliGRAM(s) Oral with dinner    MEDICATIONS  (PRN):  acetaminophen     Tablet .. 650 milliGRAM(s) Oral every 6 hours PRN Temp greater or equal to 38C (100.4F), Mild Pain (1 - 3)  LORazepam     Tablet 0.25 milliGRAM(s) Oral at bedtime PRN Anxiety  melatonin 3 milliGRAM(s) Oral at bedtime PRN Insomnia  ondansetron Injectable 4 milliGRAM(s) IV Push every 8 hours PRN Nausea and/or Vomiting    Home Medications:  Aspir 81 oral delayed release tablet: 1 tab(s) orally once a day (12 Feb 2023 16:44)  atenolol 100 mg oral tablet: 1 tab(s) orally once a day (12 Feb 2023 16:44)  finasteride 5 mg oral tablet: 1 tab(s) orally once a day (12 Feb 2023 16:44)  furosemide 40 mg oral tablet: 1 tab(s) orally once a day (12 Feb 2023 16:44)  levothyroxine 150 mcg (0.15 mg) oral tablet: 1 tab(s) orally once a day (12 Feb 2023 16:44)  pravastatin 40 mg oral tablet: 1 tab(s) orally once a day (12 Feb 2023 16:44)  Vitamin D3 25 mcg (1000 intl units) oral tablet: 1 tab(s) orally once a day (12 Feb 2023 16:44)  Xarelto 15 mg oral tablet: 1 tab(s) orally once a day (in the evening) (12 Feb 2023 16:44)

## 2023-02-15 NOTE — PROGRESS NOTE ADULT - PROBLEM SELECTOR PLAN 2
elevated BNP but no dyspnea, no signs of heart failure.  -CXR possible congestion.  -monitor volume status no need for diuresis at present.  - Echo shows EF 35-40%, mod MR, mild-mod AI, mod-severe TR, severe pulmonary HTN, mild LVH, mod diffuse hypokinesis LV.  Echo in 2015 reveals normal LVF, no wall motion abnormalities  GDMT limited to BB, no ACEI/ARB/ARNIMAR due CKD  atenolol changed to toprol xl 50 mg po daily elevated BNP but no dyspnea, no signs of heart failure.  -CXR possible congestion.  - no need for diuresis - can dc lasix - can be resumed outpatient if needed after being assessed by his cardiologist  - Echo shows EF 35-40%, mod MR, mild-mod AI, mod-severe TR, severe pulmonary HTN, mild LVH, mod diffuse hypokinesis LV.  Echo in 2015 reveals normal LVF, no wall motion abnormalities  GDMT limited to BB, no ACEI/ARB/ARNIMAR due CKD  atenolol changed to toprol xl 50 mg po bid

## 2023-02-18 DIAGNOSIS — I48.92 UNSPECIFIED ATRIAL FLUTTER: ICD-10-CM

## 2023-02-18 DIAGNOSIS — N40.0 BENIGN PROSTATIC HYPERPLASIA WITHOUT LOWER URINARY TRACT SYMPTOMS: ICD-10-CM

## 2023-02-18 DIAGNOSIS — K92.1 MELENA: ICD-10-CM

## 2023-02-18 DIAGNOSIS — I50.22 CHRONIC SYSTOLIC (CONGESTIVE) HEART FAILURE: ICD-10-CM

## 2023-02-18 DIAGNOSIS — I25.10 ATHEROSCLEROTIC HEART DISEASE OF NATIVE CORONARY ARTERY WITHOUT ANGINA PECTORIS: ICD-10-CM

## 2023-02-18 DIAGNOSIS — Z79.890 HORMONE REPLACEMENT THERAPY: ICD-10-CM

## 2023-02-18 DIAGNOSIS — D62 ACUTE POSTHEMORRHAGIC ANEMIA: ICD-10-CM

## 2023-02-18 DIAGNOSIS — I08.1 RHEUMATIC DISORDERS OF BOTH MITRAL AND TRICUSPID VALVES: ICD-10-CM

## 2023-02-18 DIAGNOSIS — Z90.49 ACQUIRED ABSENCE OF OTHER SPECIFIED PARTS OF DIGESTIVE TRACT: ICD-10-CM

## 2023-02-18 DIAGNOSIS — Z79.01 LONG TERM (CURRENT) USE OF ANTICOAGULANTS: ICD-10-CM

## 2023-02-18 DIAGNOSIS — N18.9 CHRONIC KIDNEY DISEASE, UNSPECIFIED: ICD-10-CM

## 2023-02-18 DIAGNOSIS — Z86.16 PERSONAL HISTORY OF COVID-19: ICD-10-CM

## 2023-02-18 DIAGNOSIS — I27.20 PULMONARY HYPERTENSION, UNSPECIFIED: ICD-10-CM

## 2023-02-18 DIAGNOSIS — I48.21 PERMANENT ATRIAL FIBRILLATION: ICD-10-CM

## 2023-02-18 DIAGNOSIS — Z95.5 PRESENCE OF CORONARY ANGIOPLASTY IMPLANT AND GRAFT: ICD-10-CM

## 2023-02-18 DIAGNOSIS — I24.8 OTHER FORMS OF ACUTE ISCHEMIC HEART DISEASE: ICD-10-CM

## 2023-02-18 DIAGNOSIS — E03.9 HYPOTHYROIDISM, UNSPECIFIED: ICD-10-CM

## 2023-02-18 DIAGNOSIS — Z79.82 LONG TERM (CURRENT) USE OF ASPIRIN: ICD-10-CM

## 2023-02-18 DIAGNOSIS — Z79.899 OTHER LONG TERM (CURRENT) DRUG THERAPY: ICD-10-CM

## 2023-02-18 DIAGNOSIS — N17.9 ACUTE KIDNEY FAILURE, UNSPECIFIED: ICD-10-CM

## 2023-02-22 ENCOUNTER — APPOINTMENT (OUTPATIENT)
Dept: CARDIOLOGY | Facility: CLINIC | Age: 88
End: 2023-02-22

## 2023-03-21 NOTE — ED PROCEDURE NOTE - CPROC ED ANATOMIC LOCATION1
DTC Progress Note    Recommendations/ Comments: Chart reviewed d/t elevated BG's; pt on steroids. FBS has been 302 and 256 mg/dl the past 2 days. If appropriate, please consider increasing Lantus to 25 units tonight. Also, please add A1c to next lab draw, if not already done, to better assess pts control PTA    Chart reviewed on [facility]. Patient is a 80 y.o. female with known Type 2 Diabetes on insulin injections: regular: 6 units at Breakfast and [place]- 26 units at home. A1c:   Lab Results   Component Value Date/Time    Hemoglobin A1c 7.2 02/11/2017 11:17 AM    Hemoglobin A1c 5.9 11/03/2016 04:19 AM       Recent Glucose Results:   Lab Results   Component Value Date/Time     (H) 06/05/2017 05:38 AM    GLUCPOC 362 (H) 06/05/2017 11:30 AM    GLUCPOC 256 (H) 06/05/2017 07:31 AM    GLUCPOC 312 (H) 06/04/2017 09:02 PM        Lab Results   Component Value Date/Time    Creatinine 3.06 06/05/2017 05:38 AM     Estimated Creatinine Clearance: 21.6 mL/min (based on Cr of 3.06). Active Orders   Diet    DIET DIABETIC CONSISTENT CARB Regular        PO intake: No data found. Current hospital DM medication: Lantus 20 units; correctional lispro- normal sensitivity     Will continue to follow as needed. Thank you  Lelo Bautista RD, CDE
posterior nostril
<--- Click here to launch ICDx for Procedure

## 2023-04-03 ENCOUNTER — INPATIENT (INPATIENT)
Facility: HOSPITAL | Age: 88
LOS: 4 days | Discharge: HOSPICE | DRG: 377 | End: 2023-04-08
Attending: INTERNAL MEDICINE | Admitting: INTERNAL MEDICINE
Payer: MEDICARE

## 2023-04-03 VITALS
HEIGHT: 63 IN | DIASTOLIC BLOOD PRESSURE: 52 MMHG | TEMPERATURE: 98 F | RESPIRATION RATE: 20 BRPM | SYSTOLIC BLOOD PRESSURE: 76 MMHG | HEART RATE: 82 BPM | OXYGEN SATURATION: 100 %

## 2023-04-03 LAB
ADD ON TEST-SPECIMEN IN LAB: SIGNIFICANT CHANGE UP
ALBUMIN SERPL ELPH-MCNC: 2.5 G/DL — LOW (ref 3.3–5)
ALP SERPL-CCNC: 68 U/L — SIGNIFICANT CHANGE UP (ref 40–120)
ALT FLD-CCNC: 17 U/L — SIGNIFICANT CHANGE UP (ref 12–78)
ANION GAP SERPL CALC-SCNC: 9 MMOL/L — SIGNIFICANT CHANGE UP (ref 5–17)
ANISOCYTOSIS BLD QL: SLIGHT — SIGNIFICANT CHANGE UP
APPEARANCE UR: CLEAR — SIGNIFICANT CHANGE UP
APTT BLD: 37.2 SEC — HIGH (ref 27.5–35.5)
AST SERPL-CCNC: 24 U/L — SIGNIFICANT CHANGE UP (ref 15–37)
BASOPHILS # BLD AUTO: 0.02 K/UL — SIGNIFICANT CHANGE UP (ref 0–0.2)
BASOPHILS NFR BLD AUTO: 0.3 % — SIGNIFICANT CHANGE UP (ref 0–2)
BILIRUB SERPL-MCNC: 0.5 MG/DL — SIGNIFICANT CHANGE UP (ref 0.2–1.2)
BILIRUB UR-MCNC: NEGATIVE — SIGNIFICANT CHANGE UP
BLD GP AB SCN SERPL QL: SIGNIFICANT CHANGE UP
BUN SERPL-MCNC: 87 MG/DL — HIGH (ref 7–23)
CALCIUM SERPL-MCNC: 8.3 MG/DL — LOW (ref 8.5–10.1)
CHLORIDE SERPL-SCNC: 105 MMOL/L — SIGNIFICANT CHANGE UP (ref 96–108)
CO2 SERPL-SCNC: 24 MMOL/L — SIGNIFICANT CHANGE UP (ref 22–31)
COLOR SPEC: YELLOW — SIGNIFICANT CHANGE UP
CREAT SERPL-MCNC: 1.92 MG/DL — HIGH (ref 0.5–1.3)
DIFF PNL FLD: ABNORMAL
EGFR: 34 ML/MIN/1.73M2 — LOW
EOSINOPHIL # BLD AUTO: 0.01 K/UL — SIGNIFICANT CHANGE UP (ref 0–0.5)
EOSINOPHIL NFR BLD AUTO: 0.1 % — SIGNIFICANT CHANGE UP (ref 0–6)
FLUAV AG NPH QL: SIGNIFICANT CHANGE UP
FLUBV AG NPH QL: SIGNIFICANT CHANGE UP
GLUCOSE SERPL-MCNC: 124 MG/DL — HIGH (ref 70–99)
GLUCOSE UR QL: NEGATIVE — SIGNIFICANT CHANGE UP
HCT VFR BLD CALC: 19.9 % — CRITICAL LOW (ref 39–50)
HGB BLD-MCNC: 6 G/DL — CRITICAL LOW (ref 13–17)
HYPOCHROMIA BLD QL: SIGNIFICANT CHANGE UP
IMM GRANULOCYTES NFR BLD AUTO: 0.3 % — SIGNIFICANT CHANGE UP (ref 0–0.9)
INR BLD: 2.09 RATIO — HIGH (ref 0.88–1.16)
KETONES UR-MCNC: NEGATIVE — SIGNIFICANT CHANGE UP
LACTATE SERPL-SCNC: 2.2 MMOL/L — HIGH (ref 0.7–2)
LACTATE SERPL-SCNC: 2.7 MMOL/L — HIGH (ref 0.7–2)
LEUKOCYTE ESTERASE UR-ACNC: ABNORMAL
LYMPHOCYTES # BLD AUTO: 0.55 K/UL — LOW (ref 1–3.3)
LYMPHOCYTES # BLD AUTO: 8.2 % — LOW (ref 13–44)
MAGNESIUM SERPL-MCNC: 2.2 MG/DL — SIGNIFICANT CHANGE UP (ref 1.6–2.6)
MANUAL SMEAR VERIFICATION: SIGNIFICANT CHANGE UP
MCHC RBC-ENTMCNC: 29.4 PG — SIGNIFICANT CHANGE UP (ref 27–34)
MCHC RBC-ENTMCNC: 30.2 GM/DL — LOW (ref 32–36)
MCV RBC AUTO: 97.5 FL — SIGNIFICANT CHANGE UP (ref 80–100)
MONOCYTES # BLD AUTO: 0.82 K/UL — SIGNIFICANT CHANGE UP (ref 0–0.9)
MONOCYTES NFR BLD AUTO: 12.3 % — SIGNIFICANT CHANGE UP (ref 2–14)
NEUTROPHILS # BLD AUTO: 5.25 K/UL — SIGNIFICANT CHANGE UP (ref 1.8–7.4)
NEUTROPHILS NFR BLD AUTO: 78.8 % — HIGH (ref 43–77)
NITRITE UR-MCNC: NEGATIVE — SIGNIFICANT CHANGE UP
NT-PROBNP SERPL-SCNC: 6160 PG/ML — HIGH (ref 0–450)
PH UR: 5 — SIGNIFICANT CHANGE UP (ref 5–8)
PLAT MORPH BLD: NORMAL — SIGNIFICANT CHANGE UP
PLATELET # BLD AUTO: 228 K/UL — SIGNIFICANT CHANGE UP (ref 150–400)
PLATELET COUNT - ESTIMATE: NORMAL — SIGNIFICANT CHANGE UP
POIKILOCYTOSIS BLD QL AUTO: SLIGHT — SIGNIFICANT CHANGE UP
POTASSIUM SERPL-MCNC: 4 MMOL/L — SIGNIFICANT CHANGE UP (ref 3.5–5.3)
POTASSIUM SERPL-SCNC: 4 MMOL/L — SIGNIFICANT CHANGE UP (ref 3.5–5.3)
PROT SERPL-MCNC: 6 GM/DL — SIGNIFICANT CHANGE UP (ref 6–8.3)
PROT UR-MCNC: SIGNIFICANT CHANGE UP MG/DL
PROTHROM AB SERPL-ACNC: 24.4 SEC — HIGH (ref 10.5–13.4)
RBC # BLD: 2.04 M/UL — LOW (ref 4.2–5.8)
RBC # FLD: 19.2 % — HIGH (ref 10.3–14.5)
RBC BLD AUTO: ABNORMAL
RSV RNA NPH QL NAA+NON-PROBE: SIGNIFICANT CHANGE UP
SARS-COV-2 RNA SPEC QL NAA+PROBE: SIGNIFICANT CHANGE UP
SODIUM SERPL-SCNC: 138 MMOL/L — SIGNIFICANT CHANGE UP (ref 135–145)
SP GR SPEC: 1.01 — SIGNIFICANT CHANGE UP (ref 1.01–1.02)
TROPONIN I, HIGH SENSITIVITY RESULT: 147.05 NG/L — HIGH
UROBILINOGEN FLD QL: NEGATIVE — SIGNIFICANT CHANGE UP
WBC # BLD: 6.67 K/UL — SIGNIFICANT CHANGE UP (ref 3.8–10.5)
WBC # FLD AUTO: 6.67 K/UL — SIGNIFICANT CHANGE UP (ref 3.8–10.5)

## 2023-04-03 PROCEDURE — 93010 ELECTROCARDIOGRAM REPORT: CPT

## 2023-04-03 PROCEDURE — 99291 CRITICAL CARE FIRST HOUR: CPT | Mod: CS

## 2023-04-03 PROCEDURE — 74176 CT ABD & PELVIS W/O CONTRAST: CPT | Mod: 26,MA

## 2023-04-03 PROCEDURE — 71045 X-RAY EXAM CHEST 1 VIEW: CPT | Mod: 26

## 2023-04-03 PROCEDURE — 71250 CT THORAX DX C-: CPT | Mod: 26,MA

## 2023-04-03 RX ORDER — CEFTRIAXONE 500 MG/1
1000 INJECTION, POWDER, FOR SOLUTION INTRAMUSCULAR; INTRAVENOUS ONCE
Refills: 0 | Status: COMPLETED | OUTPATIENT
Start: 2023-04-03 | End: 2023-04-03

## 2023-04-03 RX ORDER — SODIUM CHLORIDE 9 MG/ML
1000 INJECTION INTRAMUSCULAR; INTRAVENOUS; SUBCUTANEOUS ONCE
Refills: 0 | Status: COMPLETED | OUTPATIENT
Start: 2023-04-03 | End: 2023-04-03

## 2023-04-03 RX ORDER — PHENYLEPHRINE HYDROCHLORIDE 10 MG/ML
0.5 INJECTION INTRAVENOUS
Qty: 40 | Refills: 0 | Status: DISCONTINUED | OUTPATIENT
Start: 2023-04-03 | End: 2023-04-03

## 2023-04-03 RX ORDER — FUROSEMIDE 40 MG
40 TABLET ORAL ONCE
Refills: 0 | Status: COMPLETED | OUTPATIENT
Start: 2023-04-03 | End: 2023-04-03

## 2023-04-03 RX ORDER — AZITHROMYCIN 500 MG/1
500 TABLET, FILM COATED ORAL ONCE
Refills: 0 | Status: COMPLETED | OUTPATIENT
Start: 2023-04-03 | End: 2023-04-03

## 2023-04-03 RX ADMIN — SODIUM CHLORIDE 1000 MILLILITER(S): 9 INJECTION INTRAMUSCULAR; INTRAVENOUS; SUBCUTANEOUS at 18:26

## 2023-04-03 NOTE — ED ADULT NURSE NOTE - NS ED NURSE LEVEL OF CONSCIOUSNESS SPEECH
Age appropriate Quality 110: Preventive Care And Screening: Influenza Immunization: Influenza Immunization not Administered because Patient Refused. Detail Level: Detailed

## 2023-04-03 NOTE — ED PROVIDER NOTE - SECONDARY DIAGNOSIS.
I will SWITCH the dose or number of times a day I take the medications listed below when I get home from the hospital:  None GI bleed Anemia due to acute blood loss Sepsis

## 2023-04-03 NOTE — ED ADULT NURSE REASSESSMENT NOTE - NS ED NURSE REASSESS COMMENT FT1
Purposeful rounding completed. PT receiving blood transfusion at this time. Family at bedside. Call bell in reach. No needs, questions, or complaints at this time.

## 2023-04-03 NOTE — ED PROVIDER NOTE - PROGRESS NOTE DETAILS
Melani Oswald for attending Dr. Martines   Spoke with Dr. Vieyra who would like pt to go to step down. Spoke to ICU PA who will evaluate the pt. patient seen by ICU AYESHA Ramirez, wants 2 units PRBC's, first unit ASAP in 30 min, second unit over 2 hours. . ICU will reassess patient and decide on where to DISPO. ~Tommy Monsivais PA-C PA: Patient is an 86 yo male with PMHx of afib on xarelto, CHF, CKD, renal tumor, CAD s/p stents, HTN who presents to [facility] c/o general malaise, fatigue, and low BP. Patient's BP has been running low x 5 days. Pt was here multiple times in the past couple of months for COVID and black stools. Pt is more lethargic than usual but is still eating and drinking normal. +SOUSA. Son has not noticed any changes in the color of the stools. Denies any cough or fever. Denies any recent epistaxis episodes. Pt's last hemoglobin was 9.6 on 3/24/23. ~Tommy Monsivais PA-C

## 2023-04-03 NOTE — ED PROVIDER NOTE - CLINICAL SUMMARY MEDICAL DECISION MAKING FREE TEXT BOX
Pt with hx of afib on xarelto, CHF, CKD, renal tumor, CAD s/p stents, HTN, anemia, GI bleed here with low blood pressure x many days, and SOB, SOUSA. No fever, no cough. Labs, CXR, IV fluids. Pt found to be anemic will get blood transfusion. Pt found to be heme positive, will start protnix and protnix strip. Pt with hx of afib on xarelto, CHF, CKD, renal tumor, CAD s/p stents, HTN, anemia, GI bleed here with low blood pressure x many days, and SOB, SOUSA. No fever, no cough. Labs, CXR, IV fluids. Pt found to be anemic will get blood transfusion. Pt found to be heme positive, will start protonix and protonix drip.

## 2023-04-03 NOTE — PHARMACOTHERAPY INTERVENTION NOTE - COMMENTS
Medication history complete. Medications and allergies reviewed with patient's sonMarcos at bedside and confirmed with .

## 2023-04-03 NOTE — ED PROVIDER NOTE - CARE PLAN
1 Principal Discharge DX:	Hypotension  Secondary Diagnosis:	GI bleed  Secondary Diagnosis:	Anemia due to acute blood loss  Secondary Diagnosis:	Sepsis

## 2023-04-03 NOTE — ED ADULT NURSE NOTE - OBJECTIVE STATEMENT
Pt arrives to ED from home after multiple low blood pressure readings. Hx low ejection fraction, CHF< CAD- stents, hard of hearing.

## 2023-04-03 NOTE — ED PROVIDER NOTE - CRITICAL CARE ATTENDING CONTRIBUTION TO CARE
Critical care time spent evaluating and reevaluating patient, ordering and interpreting diagnostics, ordering therapeutics, speaking to pt  and admitting MD,reviewing old records and documenting. pt eval, treatment and plan contemporaneously with ACP Ali. Agree with notes. Prema ROTHMAN

## 2023-04-03 NOTE — ED PROVIDER NOTE - GASTROINTESTINAL, MLM
Abdomen soft, non-tender, no guarding. Stool guaiac performed. Lot #: [id number]; Expiration: 10/31/2023; Result: heme positive ; QC positive

## 2023-04-03 NOTE — ED ADULT TRIAGE NOTE - CHIEF COMPLAINT QUOTE
Pt arrives to ED from home after multiple low blood pressure readings. Hx low ejection fraction, CHF< CAD- stents.

## 2023-04-03 NOTE — ED ADULT NURSE NOTE - NSIMPLEMENTINTERV_GEN_ALL_ED
Implemented All Fall with Harm Risk Interventions:  [place] to call system. Call bell, personal items and telephone within reach. Instruct patient to call for assistance. Room bathroom lighting operational. Non-slip footwear when patient is off stretcher. Physically safe environment: no spills, clutter or unnecessary equipment. Stretcher in lowest position, wheels locked, appropriate side rails in place. Provide visual cue, wrist band, yellow gown, etc. Monitor gait and stability. Monitor for mental status changes and reorient to person, place, and time. Review medications for side effects contributing to fall risk. Reinforce activity limits and safety measures with patient and family. Provide visual clues: red socks.

## 2023-04-03 NOTE — ED PROVIDER NOTE - PHYSICAL EXAMINATION
PA NOTE: GEN: ill appearing, looks pale, drowsy but mentation is normal. NAD. HEENT: Throat clear. Airway is patent. EYES: PERRLA. EOMI. Head: NC/AT. NECK: Supple, No JVD. FROM. C-spine non-tender. CV:S1S2, RRR, LUNGS: Non-labored breathing, no tachypnea. O2sat 100% RA. CTA b/l. No w/r/r. CHEST: Equal chest expansion and rise. No deformity. ABD: Soft, NT/ND, no rebound, no guarding. No CVAT. Guaiac done by dr. Lloyd. EXT: No e/c/c. 2+ distal pulses. SKIN: No rashes. NEURO: No focal deficits. CN II-XII intact. FROM. 5/5 motor and sensory. ~Tommy Monsivais PA-C

## 2023-04-03 NOTE — ED PROVIDER NOTE - OBJECTIVE STATEMENT
88 yo male w/PMHx of afib on xarelto, CHF, CKD, renal tumor, CAD s/p stents, HTN presents to the ED for hypotension. Pt has been having hypotension for about 5 days according to the son. Pt was here multiple times in the past couple of months for COVID and black stools. Pt is more lethargic than usual but is still eating and drinking normal. +SOUSA. Son has not noticed any changes in the color of the stools. Denies any cough or fever. Denies any recent epistaxis episodes. Pt's last hemoglobin was 9.6 on 3/24/23.     PCP: Dr. Reyes   Cardiologist: Dr. Patton 86 yo male w/PMHx of afib on xarelto, CHF, CKD, renal tumor, CAD s/p stents, HTN presents to the ED for hypotension. Pt has been having hypotension for about 5 days according to the son. Pt was here multiple times in the past couple of months for COVID and black stools. Pt is more lethargic than usual but is still eating and drinking normal. +SOUSA. Son has not noticed any changes in the color of the stools. Denies any cough or fever. Denies any recent epistaxis episodes. Pt's last hemoglobin was 9.6 on 3/24/23. Pt is DNR/DNI    PCP: Dr. Reyes   Cardiologist: Dr. Patton

## 2023-04-04 DIAGNOSIS — I95.9 HYPOTENSION, UNSPECIFIED: ICD-10-CM

## 2023-04-04 LAB
ALBUMIN SERPL ELPH-MCNC: 2.5 G/DL — LOW (ref 3.3–5)
ALP SERPL-CCNC: 61 U/L — SIGNIFICANT CHANGE UP (ref 40–120)
ALT FLD-CCNC: 15 U/L — SIGNIFICANT CHANGE UP (ref 12–78)
ANION GAP SERPL CALC-SCNC: 5 MMOL/L — SIGNIFICANT CHANGE UP (ref 5–17)
AST SERPL-CCNC: 18 U/L — SIGNIFICANT CHANGE UP (ref 15–37)
BACTERIA # UR AUTO: ABNORMAL
BASOPHILS # BLD AUTO: 0.01 K/UL — SIGNIFICANT CHANGE UP (ref 0–0.2)
BASOPHILS NFR BLD AUTO: 0.2 % — SIGNIFICANT CHANGE UP (ref 0–2)
BILIRUB SERPL-MCNC: 0.6 MG/DL — SIGNIFICANT CHANGE UP (ref 0.2–1.2)
BUN SERPL-MCNC: 86 MG/DL — HIGH (ref 7–23)
CALCIUM SERPL-MCNC: 8.3 MG/DL — LOW (ref 8.5–10.1)
CHLORIDE SERPL-SCNC: 106 MMOL/L — SIGNIFICANT CHANGE UP (ref 96–108)
CO2 SERPL-SCNC: 27 MMOL/L — SIGNIFICANT CHANGE UP (ref 22–31)
COMMENT - URINE: SIGNIFICANT CHANGE UP
CREAT SERPL-MCNC: 1.71 MG/DL — HIGH (ref 0.5–1.3)
EGFR: 38 ML/MIN/1.73M2 — LOW
EOSINOPHIL # BLD AUTO: 0.01 K/UL — SIGNIFICANT CHANGE UP (ref 0–0.5)
EOSINOPHIL NFR BLD AUTO: 0.2 % — SIGNIFICANT CHANGE UP (ref 0–6)
EPI CELLS # UR: SIGNIFICANT CHANGE UP
FERRITIN SERPL-MCNC: 58 NG/ML — SIGNIFICANT CHANGE UP (ref 30–400)
FIBRINOGEN PPP-MCNC: 466 MG/DL — SIGNIFICANT CHANGE UP (ref 330–520)
GLUCOSE SERPL-MCNC: 93 MG/DL — SIGNIFICANT CHANGE UP (ref 70–99)
HAPTOGLOB SERPL-MCNC: 110 MG/DL — SIGNIFICANT CHANGE UP (ref 34–200)
HCT VFR BLD CALC: 25.7 % — LOW (ref 39–50)
HCT VFR BLD CALC: 26.6 % — LOW (ref 39–50)
HCT VFR BLD CALC: 27.3 % — LOW (ref 39–50)
HCT VFR BLD CALC: 27.7 % — LOW (ref 39–50)
HGB BLD-MCNC: 8.3 G/DL — LOW (ref 13–17)
HGB BLD-MCNC: 8.3 G/DL — LOW (ref 13–17)
HGB BLD-MCNC: 8.7 G/DL — LOW (ref 13–17)
HGB BLD-MCNC: 8.9 G/DL — LOW (ref 13–17)
IMM GRANULOCYTES NFR BLD AUTO: 0.3 % — SIGNIFICANT CHANGE UP (ref 0–0.9)
INR BLD: 1.6 RATIO — HIGH (ref 0.88–1.16)
IRON SATN MFR SERPL: 14 % — LOW (ref 16–55)
IRON SATN MFR SERPL: 48 UG/DL — SIGNIFICANT CHANGE UP (ref 45–165)
LACTATE SERPL-SCNC: 2.5 MMOL/L — HIGH (ref 0.7–2)
LDH SERPL L TO P-CCNC: 137 U/L — SIGNIFICANT CHANGE UP (ref 84–241)
LYMPHOCYTES # BLD AUTO: 0.53 K/UL — LOW (ref 1–3.3)
LYMPHOCYTES # BLD AUTO: 9.1 % — LOW (ref 13–44)
MAGNESIUM SERPL-MCNC: 2.2 MG/DL — SIGNIFICANT CHANGE UP (ref 1.6–2.6)
MCHC RBC-ENTMCNC: 29.8 PG — SIGNIFICANT CHANGE UP (ref 27–34)
MCHC RBC-ENTMCNC: 29.8 PG — SIGNIFICANT CHANGE UP (ref 27–34)
MCHC RBC-ENTMCNC: 30 PG — SIGNIFICANT CHANGE UP (ref 27–34)
MCHC RBC-ENTMCNC: 30.2 PG — SIGNIFICANT CHANGE UP (ref 27–34)
MCHC RBC-ENTMCNC: 31.2 GM/DL — LOW (ref 32–36)
MCHC RBC-ENTMCNC: 31.9 GM/DL — LOW (ref 32–36)
MCHC RBC-ENTMCNC: 32.1 GM/DL — SIGNIFICANT CHANGE UP (ref 32–36)
MCHC RBC-ENTMCNC: 32.3 GM/DL — SIGNIFICANT CHANGE UP (ref 32–36)
MCV RBC AUTO: 92.6 FL — SIGNIFICANT CHANGE UP (ref 80–100)
MCV RBC AUTO: 92.8 FL — SIGNIFICANT CHANGE UP (ref 80–100)
MCV RBC AUTO: 93.5 FL — SIGNIFICANT CHANGE UP (ref 80–100)
MCV RBC AUTO: 96.7 FL — SIGNIFICANT CHANGE UP (ref 80–100)
MONOCYTES # BLD AUTO: 0.74 K/UL — SIGNIFICANT CHANGE UP (ref 0–0.9)
MONOCYTES NFR BLD AUTO: 12.7 % — SIGNIFICANT CHANGE UP (ref 2–14)
NEUTROPHILS # BLD AUTO: 4.52 K/UL — SIGNIFICANT CHANGE UP (ref 1.8–7.4)
NEUTROPHILS NFR BLD AUTO: 77.5 % — HIGH (ref 43–77)
PHOSPHATE SERPL-MCNC: 3.8 MG/DL — SIGNIFICANT CHANGE UP (ref 2.5–4.5)
PLATELET # BLD AUTO: 165 K/UL — SIGNIFICANT CHANGE UP (ref 150–400)
PLATELET # BLD AUTO: 172 K/UL — SIGNIFICANT CHANGE UP (ref 150–400)
PLATELET # BLD AUTO: 183 K/UL — SIGNIFICANT CHANGE UP (ref 150–400)
PLATELET # BLD AUTO: 185 K/UL — SIGNIFICANT CHANGE UP (ref 150–400)
POTASSIUM SERPL-MCNC: 4.1 MMOL/L — SIGNIFICANT CHANGE UP (ref 3.5–5.3)
POTASSIUM SERPL-SCNC: 4.1 MMOL/L — SIGNIFICANT CHANGE UP (ref 3.5–5.3)
PROT SERPL-MCNC: 5.8 GM/DL — LOW (ref 6–8.3)
PROTHROM AB SERPL-ACNC: 18.7 SEC — HIGH (ref 10.5–13.4)
RBC # BLD: 2.75 M/UL — LOW (ref 4.2–5.8)
RBC # BLD: 2.77 M/UL — LOW (ref 4.2–5.8)
RBC # BLD: 2.92 M/UL — LOW (ref 4.2–5.8)
RBC # BLD: 2.92 M/UL — LOW (ref 4.2–5.8)
RBC # BLD: 2.99 M/UL — LOW (ref 4.2–5.8)
RBC # FLD: 17.4 % — HIGH (ref 10.3–14.5)
RBC # FLD: 17.6 % — HIGH (ref 10.3–14.5)
RBC # FLD: 17.6 % — HIGH (ref 10.3–14.5)
RBC # FLD: 17.9 % — HIGH (ref 10.3–14.5)
RBC CASTS # UR COMP ASSIST: >50 /HPF (ref 0–4)
RETICS #: 113.9 K/UL — SIGNIFICANT CHANGE UP (ref 25–125)
RETICS/RBC NFR: 3.9 % — HIGH (ref 0.5–2.5)
SODIUM SERPL-SCNC: 138 MMOL/L — SIGNIFICANT CHANGE UP (ref 135–145)
TIBC SERPL-MCNC: 350 UG/DL — SIGNIFICANT CHANGE UP (ref 220–430)
TRANSFERRIN SERPL-MCNC: 286 MG/DL — SIGNIFICANT CHANGE UP (ref 200–360)
UIBC SERPL-MCNC: 302 UG/DL — SIGNIFICANT CHANGE UP (ref 110–370)
WBC # BLD: 5.16 K/UL — SIGNIFICANT CHANGE UP (ref 3.8–10.5)
WBC # BLD: 5.83 K/UL — SIGNIFICANT CHANGE UP (ref 3.8–10.5)
WBC # BLD: 6.36 K/UL — SIGNIFICANT CHANGE UP (ref 3.8–10.5)
WBC # BLD: 7.74 K/UL — SIGNIFICANT CHANGE UP (ref 3.8–10.5)
WBC # FLD AUTO: 5.16 K/UL — SIGNIFICANT CHANGE UP (ref 3.8–10.5)
WBC # FLD AUTO: 5.83 K/UL — SIGNIFICANT CHANGE UP (ref 3.8–10.5)
WBC # FLD AUTO: 6.36 K/UL — SIGNIFICANT CHANGE UP (ref 3.8–10.5)
WBC # FLD AUTO: 7.74 K/UL — SIGNIFICANT CHANGE UP (ref 3.8–10.5)
WBC UR QL: ABNORMAL /HPF (ref 0–5)

## 2023-04-04 PROCEDURE — 83540 ASSAY OF IRON: CPT

## 2023-04-04 PROCEDURE — P9016: CPT

## 2023-04-04 PROCEDURE — 36415 COLL VENOUS BLD VENIPUNCTURE: CPT

## 2023-04-04 PROCEDURE — 85610 PROTHROMBIN TIME: CPT

## 2023-04-04 PROCEDURE — 84466 ASSAY OF TRANSFERRIN: CPT

## 2023-04-04 PROCEDURE — 43235 EGD DIAGNOSTIC BRUSH WASH: CPT

## 2023-04-04 PROCEDURE — 83550 IRON BINDING TEST: CPT

## 2023-04-04 PROCEDURE — 83605 ASSAY OF LACTIC ACID: CPT

## 2023-04-04 PROCEDURE — 97161 PT EVAL LOW COMPLEX 20 MIN: CPT | Mod: GP

## 2023-04-04 PROCEDURE — 83010 ASSAY OF HAPTOGLOBIN QUANT: CPT

## 2023-04-04 PROCEDURE — P9059: CPT

## 2023-04-04 PROCEDURE — 85027 COMPLETE CBC AUTOMATED: CPT

## 2023-04-04 PROCEDURE — 83735 ASSAY OF MAGNESIUM: CPT

## 2023-04-04 PROCEDURE — 80048 BASIC METABOLIC PNL TOTAL CA: CPT

## 2023-04-04 PROCEDURE — 85384 FIBRINOGEN ACTIVITY: CPT

## 2023-04-04 PROCEDURE — 85045 AUTOMATED RETICULOCYTE COUNT: CPT

## 2023-04-04 PROCEDURE — 80053 COMPREHEN METABOLIC PANEL: CPT

## 2023-04-04 PROCEDURE — 36430 TRANSFUSION BLD/BLD COMPNT: CPT

## 2023-04-04 PROCEDURE — 84100 ASSAY OF PHOSPHORUS: CPT

## 2023-04-04 PROCEDURE — 97116 GAIT TRAINING THERAPY: CPT | Mod: GP

## 2023-04-04 PROCEDURE — 85025 COMPLETE CBC W/AUTO DIFF WBC: CPT

## 2023-04-04 PROCEDURE — C9113: CPT

## 2023-04-04 PROCEDURE — 99291 CRITICAL CARE FIRST HOUR: CPT

## 2023-04-04 PROCEDURE — 83615 LACTATE (LD) (LDH) ENZYME: CPT

## 2023-04-04 PROCEDURE — 82728 ASSAY OF FERRITIN: CPT

## 2023-04-04 RX ORDER — MIDODRINE HYDROCHLORIDE 2.5 MG/1
5 TABLET ORAL EVERY 8 HOURS
Refills: 0 | Status: DISCONTINUED | OUTPATIENT
Start: 2023-04-04 | End: 2023-04-05

## 2023-04-04 RX ORDER — CEFTRIAXONE 500 MG/1
1000 INJECTION, POWDER, FOR SOLUTION INTRAMUSCULAR; INTRAVENOUS EVERY 24 HOURS
Refills: 0 | Status: DISCONTINUED | OUTPATIENT
Start: 2023-04-04 | End: 2023-04-06

## 2023-04-04 RX ORDER — LEVOTHYROXINE SODIUM 125 MCG
112 TABLET ORAL AT BEDTIME
Refills: 0 | Status: DISCONTINUED | OUTPATIENT
Start: 2023-04-04 | End: 2023-04-05

## 2023-04-04 RX ORDER — CHLORHEXIDINE GLUCONATE 213 G/1000ML
1 SOLUTION TOPICAL
Refills: 0 | Status: DISCONTINUED | OUTPATIENT
Start: 2023-04-04 | End: 2023-04-08

## 2023-04-04 RX ORDER — PANTOPRAZOLE SODIUM 20 MG/1
40 TABLET, DELAYED RELEASE ORAL EVERY 12 HOURS
Refills: 0 | Status: DISCONTINUED | OUTPATIENT
Start: 2023-04-04 | End: 2023-04-08

## 2023-04-04 RX ORDER — PHENYLEPHRINE HYDROCHLORIDE 10 MG/ML
0.2 INJECTION INTRAVENOUS
Qty: 40 | Refills: 0 | Status: DISCONTINUED | OUTPATIENT
Start: 2023-04-04 | End: 2023-04-06

## 2023-04-04 RX ORDER — METOPROLOL TARTRATE 50 MG
5 TABLET ORAL ONCE
Refills: 0 | Status: COMPLETED | OUTPATIENT
Start: 2023-04-04 | End: 2023-04-04

## 2023-04-04 RX ADMIN — AZITHROMYCIN 255 MILLIGRAM(S): 500 TABLET, FILM COATED ORAL at 00:46

## 2023-04-04 RX ADMIN — MIDODRINE HYDROCHLORIDE 5 MILLIGRAM(S): 2.5 TABLET ORAL at 07:01

## 2023-04-04 RX ADMIN — CHLORHEXIDINE GLUCONATE 1 APPLICATION(S): 213 SOLUTION TOPICAL at 07:02

## 2023-04-04 RX ADMIN — PANTOPRAZOLE SODIUM 40 MILLIGRAM(S): 20 TABLET, DELAYED RELEASE ORAL at 21:52

## 2023-04-04 RX ADMIN — PANTOPRAZOLE SODIUM 40 MILLIGRAM(S): 20 TABLET, DELAYED RELEASE ORAL at 03:57

## 2023-04-04 RX ADMIN — CEFTRIAXONE 1000 MILLIGRAM(S): 500 INJECTION, POWDER, FOR SOLUTION INTRAMUSCULAR; INTRAVENOUS at 18:31

## 2023-04-04 RX ADMIN — PANTOPRAZOLE SODIUM 40 MILLIGRAM(S): 20 TABLET, DELAYED RELEASE ORAL at 09:17

## 2023-04-04 RX ADMIN — CEFTRIAXONE 1000 MILLIGRAM(S): 500 INJECTION, POWDER, FOR SOLUTION INTRAMUSCULAR; INTRAVENOUS at 00:45

## 2023-04-04 RX ADMIN — Medication 112 MICROGRAM(S): at 21:52

## 2023-04-04 RX ADMIN — MIDODRINE HYDROCHLORIDE 5 MILLIGRAM(S): 2.5 TABLET ORAL at 21:51

## 2023-04-04 RX ADMIN — MIDODRINE HYDROCHLORIDE 5 MILLIGRAM(S): 2.5 TABLET ORAL at 13:25

## 2023-04-04 NOTE — DIETITIAN INITIAL EVALUATION ADULT - OTHER INFO
86 yo male, PMHx AFib on Xarelto (last dose 4/2 PM), HFrEF (TTE 2/13/23 EF 35-40%, dHF, mod MR/AR, severe TR, severe pHTN normal RV), CAD/PCI, prior AMI, HTN, renal cell carcinoma (observation), CKD, who presented from home to the ED for evaluation of hypotension and lethargy. Patient's children at bedside report that he has been hypotensive intermittently for the past 5 days, They stopped his Atenolol, however his condition significantly worsened over the past 2-3 days, with increasing lethargy. He spent the entirety of the day prior to arrival sleeping. They have also noted significant progressive SOUSA, only able to walk a few feet prior to becoming markedly dyspneic with associated LE edema. Denies fever or chills, abdominal pain, melena, epistaxis (last 08/2022), vomiting, chest pain. Family reports patient suffered a fall a few days ago, tripped and fell backwards, no headstrike, hit left arm on TV console but did not seek medical care.    Upon arrival to ED, patient was found to be hypotensive and markedly anemic with H/H 6.0/20, lactate 2.2-2.7. FOB+. Per family, patient's Hgb has been slowly drifting down, most recently 9/6 on 3/24/23. He has had intermittent melena since having COVID in 12/2022, and was recently hospitalized at  2/2023 to evaluate for large volume of melena noted at home, however H/H remained stable and patient had no further GI bleeding while admitted, therefore melena was attributed to epistaxis and patient was discharged. Patient's sons live with him and assist in his care, and have been checking his stools - they report brown stool, deny hematochezia or melena. Patient was resuscitated with 1L crystalloid and remained hypotensive, prompting ICU consult. On evaluation, patient was lethargic but arousable. Hypotensive SBP 70-80's, ordered for 2u PRBC. Admit for hypotension, sepsis, and GI bleed.  88 yo male, PMHx AFib on Xarelto (last dose 4/2 PM), HFrEF (TTE 2/13/23 EF 35-40%, dHF, mod MR/AR, severe TR, severe pHTN normal RV), CAD/PCI, prior AMI, HTN, renal cell carcinoma (observation), CKD, who presented from home to the ED for evaluation of hypotension and lethargy. Patient's children at bedside report that he has been hypotensive intermittently for the past 5 days. His condition significantly worsened over the past 2-3 days, with increasing lethargy. They have also noted significant progressive SOUSA, only able to walk a few feet prior to becoming markedly dyspneic with associated LE edema. He has had intermittent melena since having COVID in 12/2022, and was recently hospitalized at  2/2023 to evaluate for large volume of melena noted at home, however H/H remained stable and patient had no further GI bleeding while admitted, therefore melena was attributed to epistaxis and patient was discharged. Patient's sons live with him and assist in his care, and have been checking his stools - they report brown stool, deny hematochezia or melena. Hypotensive SBP 70-80's, ordered for 2u PRBC. Admit for hypotension, sepsis, and GI bleed.     As discussed during IDR, now on pressors 2/2 hypotension. Currently NPO 2/2 EGD 2/2 GI bleed and observed melena as per gastro note on 4/4. Reports feeling hungry and eager to eat. Recommend to c/w NPO diet and advance to regular diet as medically feasible as per MD orders and as tolerated to encourage PO intake. Bed scale wt of 148# obtained on 4/4 by RN w/ moderate edema doc'd, likely skewing wt. Reports -150# x last few weeks. Wt changes unknown 2/2 edema. NFPE reveals moderate-severe muscle/fat wasting - pt appears weak, frail, and malnourished. Once diet advances to either full liquids or solid food, will add Ensure Plus HP TID to increase calorie and protein intake. DNR/DNI status w/ nutrition support addressed - NO feeding tube, family noted if pt's condition continues to deteriorate, will make comfort care. See below for other recommendations.

## 2023-04-04 NOTE — DIETITIAN INITIAL EVALUATION ADULT - ADD RECOMMEND
1. C/w NPO diet and recommend to advance to regular diet as medically feasible as per MD orders and as tolerated   2. Once diet is advanced to full liquids or solid food, add Ensure Plus HP TID (provides 350 kcals, 20 g pro/ shake) to increase calorie and protein intake   3. Encourage protein-rich foods, maximize food preferences when diet is advanced   4. Recommend MVI w/ minerals daily to ensure 100% RDA met   5. Consider adding thiamine 100 mg daily 2/2 poor PO intake/ malnutrition   6. Monitor bowel movements, if no BM for >3 days, consider implementing bowel regimen.   7. Monitor lytes and hydration replete prn   8. Monitor wt daily to track/trend wt changes   RD will continue to monitor PO intake, labs, hydration, and wt prn.

## 2023-04-04 NOTE — CONSULT NOTE ADULT - NS ATTEND AMEND GEN_ALL_CORE FT
87 year old man with afib, CHF, TR, on xarelto, with melena.     Rescucitate patient.   At least 2 large bore IV's.   Plan for EGD.

## 2023-04-04 NOTE — PROGRESS NOTE ADULT - SUBJECTIVE AND OBJECTIVE BOX
CC:    HPI:  88 yo male, PMHx AFib on Xarelto (last dose  PM), HFrEF (TTE 23 EF 35-40%, dHF, mod MR/AR, severe TR, severe pHTN normal RV), CAD/PCI, prior AMI, HTN, renal cell carcinoma (observation), CKD, who presented from home to the ED for evaluation of hypotension and lethargy. Patient's children at bedside report that he has been hypotensive intermittently for the past 5 days, They stopped his Atenolol, however his condition significantly worsened over the past 2-3 days, with increasing lethargy. He spent the entirety of the day prior to arrival sleeping. They have also noted significant progressive SOUSA, only able to walk a few feet prior to becoming markedly dyspneic with associated LE edema. Denies fever or chills, abdominal pain, melena, epistaxis (last 2022), vomiting, chest pain. Family reports patient suffered a fall a few days ago, tripped and fell backwards, no headstrike, hit left arm on TV console but did not seek medical care.    Upon arrival to ED, patient was found to be hypotensive and markedly anemic with H/H 6.0/20, lactate 2.2-2.7. FOB+. Per family, patient's Hgb has been slowly drifting down, most recently 9/6 on 3/24/23. He has had intermittent melena since having COVID in 2022, and was recently hospitalized at  2023 to evaluate for large volume of melena noted at home, however H/H remained stable and patient had no further GI bleeding while admitted, therefore melena was attributed to epistaxis and patient was discharged. Patient's sons live with him and assist in his care, and have been checking his stools - they report brown stool, deny hematochezia or melena. Patient was resuscitated with 1L crystalloid and remained hypotensive, prompting ICU consult. On evaluation, patient was lethargic but arousable. Hypotensive SBP 70-80's, ordered for 2u PRBC.     : Patient S/P 2U PRBC.  For EGD today       PAST MEDICAL & SURGICAL HISTORY:  CAD (coronary artery disease)      S/P coronary artery stent placement      Afib          FAMILY HISTORY:      Social Hx:    Allergies    No Known Allergies    Intolerances        Height (cm): 160 ( @ 17:35)  Weight (kg): 57 ( @ 19:05)  BMI (kg/m2): 22.3 ( @ 19:05)    ICU Vital Signs Last 24 Hrs  T(C): 37.1 (2023 08:00), Max: 37.1 (2023 03:35)  T(F): 98.7 (2023 08:00), Max: 98.7 (2023 03:35)  HR: 75 (2023 10:00) (71 - 114)  BP: 99/54 (2023 10:00) (50/35 - 103/66)  BP(mean): 69 (2023 10:00) (36 - 89)  ABP: --  ABP(mean): --  RR: 16 (2023 10:00) (11 - 20)  SpO2: 100% (2023 10:00) (97% - 100%)    O2 Parameters below as of 2023 10:00  Patient On (Oxygen Delivery Method): nasal cannula  O2 Flow (L/min): 2              I&O's Summary    2023 07:01  -  2023 07:00  --------------------------------------------------------  IN: 578 mL / OUT: 0 mL / NET: 578 mL                              8.3    5.16  )-----------( 165      ( 2023 12:00 )             25.7       -04    138  |  106  |  86<H>  ----------------------------<  93  4.1   |  27  |  1.71<H>    Ca    8.3<L>      2023 05:30  Phos  3.8     04-04  Mg     2.2     04-04    TPro  5.8<L>  /  Alb  2.5<L>  /  TBili  0.6  /  DBili  x   /  AST  18  /  ALT  15  /  AlkPhos  61  04-04                Urinalysis Basic - ( 2023 22:42 )    Color: Yellow / Appearance: Clear / S.010 / pH: x  Gluc: x / Ketone: Negative  / Bili: Negative / Urobili: Negative   Blood: x / Protein: Trace mg/dL / Nitrite: Negative   Leuk Esterase: Trace / RBC: >50 /HPF / WBC 11-25 /HPF   Sq Epi: x / Non Sq Epi: Few / Bacteria: Occasional        MEDICATIONS  (STANDING):  cefTRIAXone Injectable. 1000 milliGRAM(s) IV Push every 24 hours  chlorhexidine 4% Liquid 1 Application(s) Topical <User Schedule>  levothyroxine Injectable 112 MICROGram(s) IV Push at bedtime  midodrine 5 milliGRAM(s) Oral every 8 hours  pantoprazole  Injectable 40 milliGRAM(s) IV Push every 12 hours  phenylephrine    Infusion 0.2 MICROgram(s)/kG/Min (4.28 mL/Hr) IV Continuous <Continuous>    MEDICATIONS  (PRN):      DVT Prophylaxis:    Advanced Directives:  Discussed with:    Visit Information: 30 min    ** Time is exclusive of billed procedures and/or teaching and/or routine family updates.

## 2023-04-04 NOTE — CONSULT NOTE ADULT - ASSESSMENT
87 year old male with multiple medical issues, recurrent symptomatic anemia with fall on chronic AC with melena    Imp: UGIB    Rec:  ::EGD today  ::Maintain NPO  ::PPI  ::Trend HH and transfuse to maintain Hgb>8    Discussed with Dr. Ferreira

## 2023-04-04 NOTE — H&P ADULT - HISTORY OF PRESENT ILLNESS
86 yo male, PMHx AFib on Xarelto (last dose 4/2 PM), HFrEF (TTE 2/13/23 EF 35-40%, dHF, mod MR/AR, severe TR, severe pHTN normal RV), CAD/PCI, prior AMI, HTN, renal cell carcinoma (observation), CKD, who presented from home to the ED for evaluation of hypotension and lethargy. Patient's children at bedside report that he has been hypotensive intermittently for the past 5 days, They stopped his Atenolol, however his condition significantly worsened over the past 2-3 days, with increasing lethargy. He spent the entirety of the day prior to arrival sleeping. They have also noted significant progressive SOUSA, only able to walk a few feet prior to becoming markedly dyspneic with associated LE edema. Denies fever or chills, abdominal pain, melena, epistaxis (last 08/2022), vomiting, chest pain. Family reports patient suffered a fall a few days ago, tripped and fell backwards, no headstrike, hit left arm on TV console but did not seek medical care.    Upon arrival to ED, patient was found to be hypotensive and markedly anemic with H/H 6.0/20, lactate 2.2-2.7. FOB+. Per family, patient's Hgb has been slowly drifting down, most recently 9/6 on 3/24/23. He has had intermittent melena since having COVID in 12/2022, and was recently hospitalized at  2/2023 to evaluate for large volume of melena noted at home, however H/H remained stable and patient had no further GI bleeding while admitted, therefore melena was attributed to epistaxis and patient was discharged. Patient's sons live with him and assist in his care, and have been checking his stools - they report brown stool, deny hematochezia or melena. Patient was resuscitated with 1L crystalloid and remained hypotensive, prompting ICU consult. On evaluation, patient was lethargic but arousable. Hypotensive SBP 70-80's, ordered for 2u PRBC.

## 2023-04-04 NOTE — DIETITIAN INITIAL EVALUATION ADULT - PERTINENT LABORATORY DATA
04-04    138  |  106  |  86<H>  ----------------------------<  93  4.1   |  27  |  1.71<H>    Ca    8.3<L>      04 Apr 2023 05:30  Phos  3.8     04-04  Mg     2.2     04-04    TPro  5.8<L>  /  Alb  2.5<L>  /  TBili  0.6  /  DBili  x   /  AST  18  /  ALT  15  /  AlkPhos  61  04-04   04-04    138  |  106  |  86<H>  ----------------------------<  93  4.1   |  27  |  1.71<H>    Ca    8.3<L>      04 Apr 2023 05:30  Phos  3.8     04-04  Mg     2.2     04-04    TPro  5.8<L>  /  Alb  2.5<L>  /  TBili  0.6  /  DBili  x   /  AST  18  /  ALT  15  /  AlkPhos  61  04-04    *Elevated BUN/Cr 2/2 renal failure; decreased Ca 2/2 renal failure / possible vit D def

## 2023-04-04 NOTE — CONSULT NOTE ADULT - SUBJECTIVE AND OBJECTIVE BOX
Patient is a 87y old  Male who presents with a chief complaint of     HPI:  This is an 87 year old male, PMHx AFib on Xarelto (last dose 4/2 PM), HFrEF (TTE 2/13/23 EF 35-40%, dHF, mod MR/AR, severe TR, severe pHTN normal RV), CAD/PCI, prior AMI, HTN, renal cell carcinoma (observation), CKD, who presented from home to the ED for evaluation of hypotension and lethargy. Patient's children at bedside report that he has been hypotensive intermittently for the past 5 days, They stopped his Atenolol, however his condition significantly worsened over the past 2-3 days, with increasing lethargy. He spent the entirety of the day prior to arrival sleeping. They have also noted significant progressive SOUSA, only able to walk a few feet prior to becoming markedly dyspneic with associated LE edema. Denies fever or chills, abdominal pain, melena, epistaxis (last 08/2022), vomiting, chest pain. Family reports patient suffered a fall a few days ago, tripped and fell backwards, no headstrike, hit left arm on TV console but did not seek medical care.    Upon arrival to ED, patient was found to be hypotensive and markedly anemic with H/H 6.0/20, lactate 2.2-2.7. FOB+. Per family, patient's Hgb has been slowly drifting down, most recently 9/6 on 3/24/23. He has had intermittent melena since having COVID in 12/2022, and was recently hospitalized at  2/2023 to evaluate for large volume of melena noted at home, however H/H remained stable and patient had no further GI bleeding while admitted, therefore melena was attributed to epistaxis and patient was discharged. Patient's sons live with him and assist in his care, and have been checking his stools - they report brown stool, deny hematochezia or melena. Patient was resuscitated with 1L crystalloid and remained hypotensive, prompting ICU consult. On evaluation, patient was lethargic but arousable. Hypotensive SBP 70-80's, ordered for 2u PRBC.    Seen at bedside in ICU. Bp soft but not on IV pressors. Patient arousable and communicative. Explained he has been weak recently. Denies nosebleeds. Denies melena at home, but +melena per staff here in hospital. Hgb 8.7 from 6.0 s/p 2 units prbc in ED. On Xarelto for AF. States last Xarelto yesterday 4/3. Denies epigastric or abdominal pain. Was hospitalized for anemia and epistaxis two months ago, endoscopy deferred at that time due to patient's comorbidities and no over GI bleeding at that time.        PAST MEDICAL & SURGICAL HISTORY:  CAD (coronary artery disease)    S/P coronary artery stent placement    Afib    MEDICATIONS  (STANDING):  cefTRIAXone Injectable. 1000 milliGRAM(s) IV Push every 24 hours  chlorhexidine 4% Liquid 1 Application(s) Topical <User Schedule>  levothyroxine Injectable 112 MICROGram(s) IV Push at bedtime  midodrine 5 milliGRAM(s) Oral every 8 hours  pantoprazole  Injectable 40 milliGRAM(s) IV Push every 12 hours      MEDICATIONS  (PRN):      Allergies    No Known Allergies    Intolerances    SOCIAL HISTORY:    FAMILY HISTORY:      REVIEW OF SYSTEMS:    CONSTITUTIONAL: + weakness, fevers or chills  EYES/ENT: No visual changes;  No vertigo or throat pain   NECK: No pain or stiffness  RESPIRATORY: No cough, wheezing, hemoptysis; No shortness of breath  CARDIOVASCULAR: No chest pain or palpitations  GASTROINTESTINAL: See HPI  GENITOURINARY: No dysuria, frequency or hematuria  NEUROLOGICAL: No numbness or weakness  SKIN: No itching, burning, rashes, or lesions   PSYCH: Normal mood and affect  All other review of systems is negative unless indicated above.    Vital Signs Last 24 Hrs  T(C): 37.1 (04 Apr 2023 08:00), Max: 37.1 (04 Apr 2023 03:35)  T(F): 98.7 (04 Apr 2023 08:00), Max: 98.7 (04 Apr 2023 03:35)  HR: 75 (04 Apr 2023 10:00) (71 - 114)  BP: 99/54 (04 Apr 2023 10:00) (50/35 - 103/66)  BP(mean): 69 (04 Apr 2023 10:00) (36 - 89)  RR: 16 (04 Apr 2023 10:00) (11 - 20)  SpO2: 100% (04 Apr 2023 10:00) (97% - 100%)    Parameters below as of 04 Apr 2023 10:00  Patient On (Oxygen Delivery Method): nasal cannula  O2 Flow (L/min): 2      PHYSICAL EXAM:    Constitutional: No acute distress, well-developed, non-toxic appearing  HEENT: masked, good phonation, not icteric  Neck: supple, no lymphadenopathy  Respiratory: clear to ascultation bilaterally, no wheezing  Cardiovascular: S1 and S2, IRR, +murmur, no rubs or gallops  Gastrointestinal: soft, non-tender, non-distended, +bowel sounds, no rebound or guarding, no surgical scars, no drains  Extremities: No peripheral edema, no cyanosis or clubbing  Vascular: 2+ peripheral pulses, no venous stasis  Neurological: A/O x 3, no focal deficits, no asterixis  Psychiatric: Normal mood, normal affect  Skin: No rashes, not jaundiced    LABS:                        8.7    5.83  )-----------( 172      ( 04 Apr 2023 05:30 )             27.3     04-04    138  |  106  |  86<H>  ----------------------------<  93  4.1   |  27  |  1.71<H>    Ca    8.3<L>      04 Apr 2023 05:30  Phos  3.8     04-04  Mg     2.2     04-04    TPro  5.8<L>  /  Alb  2.5<L>  /  TBili  0.6  /  DBili  x   /  AST  18  /  ALT  15  /  AlkPhos  61  04-04    PT/INR - ( 04 Apr 2023 05:30 )   PT: 18.7 sec;   INR: 1.60 ratio         PTT - ( 03 Apr 2023 18:07 )  PTT:37.2 sec  LIVER FUNCTIONS - ( 04 Apr 2023 05:30 )  Alb: 2.5 g/dL / Pro: 5.8 gm/dL / ALK PHOS: 61 U/L / ALT: 15 U/L / AST: 18 U/L / GGT: x             RADIOLOGY & ADDITIONAL STUDIES:   Patient is a 87y old  Male who presents with a chief complaint of     HPI:  This is an 87 year old male, PMHx AFib on Xarelto (last dose 4/2 PM), HFrEF (TTE 2/13/23 EF 35-40%, dHF, mod MR/AR, severe TR, severe pHTN normal RV), CAD/PCI, prior AMI, HTN, renal cell carcinoma (observation), CKD, who presented from home to the ED for evaluation of hypotension and lethargy. Patient's children at bedside report that he has been hypotensive intermittently for the past 5 days, They stopped his Atenolol, however his condition significantly worsened over the past 2-3 days, with increasing lethargy. He spent the entirety of the day prior to arrival sleeping. They have also noted significant progressive SOUSA, only able to walk a few feet prior to becoming markedly dyspneic with associated LE edema. Denies fever or chills, abdominal pain, melena, epistaxis (last 08/2022), vomiting, chest pain. Family reports patient suffered a fall a few days ago, tripped and fell backwards, no headstrike, hit left arm on TV console but did not seek medical care.    Upon arrival to ED, patient was found to be hypotensive and markedly anemic with H/H 6.0/20, lactate 2.2-2.7. FOB+. Per family, patient's Hgb has been slowly drifting down, most recently 9/6 on 3/24/23. He has had intermittent melena since having COVID in 12/2022, and was recently hospitalized at  2/2023 to evaluate for large volume of melena noted at home, however H/H remained stable and patient had no further GI bleeding while admitted, therefore melena was attributed to epistaxis and patient was discharged. Patient's sons live with him and assist in his care, and have been checking his stools - they report brown stool, deny hematochezia or melena. Patient was resuscitated with 1L crystalloid and remained hypotensive, prompting ICU consult. On evaluation, patient was lethargic but arousable. Hypotensive SBP 70-80's, ordered for 2u PRBC.    Seen at bedside in ICU. Bp soft but not on IV pressors. Patient arousable and communicative. Explained he has been weak recently. Denies nosebleeds. Denies melena at home, but +melena per staff here in hospital. Hgb 8.7 from 6.0 s/p 2 units prbc in ED. On Xarelto for AF. States last Xarelto yesterday 4/3. Denies epigastric or abdominal pain. Was hospitalized for anemia and epistaxis two months ago, endoscopy deferred at that time due to patient's comorbidities and no over GI bleeding at that time.        PAST MEDICAL & SURGICAL HISTORY:  CAD (coronary artery disease)    S/P coronary artery stent placement    Afib    MEDICATIONS  (STANDING):  cefTRIAXone Injectable. 1000 milliGRAM(s) IV Push every 24 hours  chlorhexidine 4% Liquid 1 Application(s) Topical <User Schedule>  levothyroxine Injectable 112 MICROGram(s) IV Push at bedtime  midodrine 5 milliGRAM(s) Oral every 8 hours  pantoprazole  Injectable 40 milliGRAM(s) IV Push every 12 hours      MEDICATIONS  (PRN):      Allergies    No Known Allergies    Intolerances    SOCIAL HISTORY:    FAMILY HISTORY:      REVIEW OF SYSTEMS:    CONSTITUTIONAL: + weakness, fevers or chills  EYES/ENT: No visual changes;  No vertigo or throat pain   NECK: No pain or stiffness  RESPIRATORY: No cough, wheezing, hemoptysis; No shortness of breath  CARDIOVASCULAR: No chest pain or palpitations  GASTROINTESTINAL: See HPI  GENITOURINARY: No dysuria, frequency or hematuria  NEUROLOGICAL: No numbness or weakness  SKIN: No itching, burning, rashes, or lesions   PSYCH: Normal mood and affect  All other review of systems is negative unless indicated above.    Vital Signs Last 24 Hrs  T(C): 37.1 (04 Apr 2023 08:00), Max: 37.1 (04 Apr 2023 03:35)  T(F): 98.7 (04 Apr 2023 08:00), Max: 98.7 (04 Apr 2023 03:35)  HR: 75 (04 Apr 2023 10:00) (71 - 114)  BP: 99/54 (04 Apr 2023 10:00) (50/35 - 103/66)  BP(mean): 69 (04 Apr 2023 10:00) (36 - 89)  RR: 16 (04 Apr 2023 10:00) (11 - 20)  SpO2: 100% (04 Apr 2023 10:00) (97% - 100%)    Parameters below as of 04 Apr 2023 10:00  Patient On (Oxygen Delivery Method): nasal cannula  O2 Flow (L/min): 2      PHYSICAL EXAM:    Constitutional: No acute distress, well-developed, non-toxic appearing  HEENT: masked, good phonation, not icteric  Neck: supple, no lymphadenopathy  Respiratory: clear to ascultation bilaterally, no wheezing  Cardiovascular: S1 and S2, IRR, +murmur, no rubs or gallops  Gastrointestinal: soft, non-tender, non-distended, +bowel sounds, no rebound or guarding, no surgical scars, no drains  Extremities: No peripheral edema, no cyanosis or clubbing  Vascular: 2+ peripheral pulses, no venous stasis  Neurological: A/O x 3, no focal deficits, no asterixis  Psychiatric: Normal mood, normal affect  Skin: No rashes, not jaundiced    LABS:                        8.7    5.83  )-----------( 172      ( 04 Apr 2023 05:30 )             27.3     04-04    138  |  106  |  86<H>  ----------------------------<  93  4.1   |  27  |  1.71<H>    Ca    8.3<L>      04 Apr 2023 05:30  Phos  3.8     04-04  Mg     2.2     04-04    TPro  5.8<L>  /  Alb  2.5<L>  /  TBili  0.6  /  DBili  x   /  AST  18  /  ALT  15  /  AlkPhos  61  04-04    PT/INR - ( 04 Apr 2023 05:30 )   PT: 18.7 sec;   INR: 1.60 ratio         PTT - ( 03 Apr 2023 18:07 )  PTT:37.2 sec  LIVER FUNCTIONS - ( 04 Apr 2023 05:30 )  Alb: 2.5 g/dL / Pro: 5.8 gm/dL / ALK PHOS: 61 U/L / ALT: 15 U/L / AST: 18 U/L / GGT: x             RADIOLOGY & ADDITIONAL STUDIES:  IMPRESSION:  Cardiomegaly, bilateral pleural effusions and pulmonary findings   suggesting mild pulmonary edema.    No acute abdominal pathology.    Interval growth of a left lower pole solid renal mass previously   characterized as renal cell carcinoma.   Patient is a 87y old  Male who presents with a chief complaint of melena    87 year old man with afib on xarelto, CHF with EF 40%, severe TR, pulmonary HTN, CAD, HTN, RCC, prior admission for anemia/melena in the setting of nosebleeds, admitted with melena and hypotesion.     Patient states he has been feeling somewhat weak over the last few days. His children who watch him closely, noted his blood pressure was lower recently so they d/c'd his atenoolol a few days ago. He felt weaker and per children had increased lethargy. He denied any chest pain but was more short of breath on exertion. Day prior to admission was very lethargic and spent all day in bed. Family deny any nose bleeding, hematemesis, hematochezia. No chest pain. Last xarelto was 4/3.        PAST MEDICAL & SURGICAL HISTORY:  CAD (coronary artery disease)    S/P coronary artery stent placement    Afib    CHF    Severe TR    HTN    CAD    HTN    RCC    epistaxis    MEDICATIONS  (STANDING):  cefTRIAXone Injectable. 1000 milliGRAM(s) IV Push every 24 hours  chlorhexidine 4% Liquid 1 Application(s) Topical <User Schedule>  levothyroxine Injectable 112 MICROGram(s) IV Push at bedtime  midodrine 5 milliGRAM(s) Oral every 8 hours  pantoprazole  Injectable 40 milliGRAM(s) IV Push every 12 hours    Allergies  No Known Allergies    SOCIAL HISTORY:  no smoking, no drinking no drugs    FAMILY HISTORY:  no colon or stomach cancer    REVIEW OF SYSTEMS:    CONSTITUTIONAL: + weakness, no fevers or chills  EYES/ENT: No visual changes;  No vertigo or throat pain   NECK: No pain or stiffness  RESPIRATORY: No cough, wheezing, hemoptysis; + shortness of breath  CARDIOVASCULAR: No chest pain or palpitations  GASTROINTESTINAL: See HPI  GENITOURINARY: No dysuria, frequency or hematuria  NEUROLOGICAL: No numbness or weakness  SKIN: No itching, burning, rashes, or lesions   PSYCH: Normal mood and affect  All other review of systems is negative unless indicated above.    Vital Signs Last 24 Hrs  T(C): 37.1 (04 Apr 2023 08:00), Max: 37.1 (04 Apr 2023 03:35)  T(F): 98.7 (04 Apr 2023 08:00), Max: 98.7 (04 Apr 2023 03:35)  HR: 75 (04 Apr 2023 10:00) (71 - 114)  BP: 99/54 (04 Apr 2023 10:00) (50/35 - 103/66)  BP(mean): 69 (04 Apr 2023 10:00) (36 - 89)  RR: 16 (04 Apr 2023 10:00) (11 - 20)  SpO2: 100% (04 Apr 2023 10:00) (97% - 100%)    Parameters below as of 04 Apr 2023 10:00  Patient On (Oxygen Delivery Method): nasal cannula  O2 Flow (L/min): 2      PHYSICAL EXAM:    Constitutional: pale, critically ill but non-toxic appearing, pleasant  HEENT: unmasked, good phonation, not icteric  Neck: supple, no lymphadenopathy  Respiratory: clear to ascultation bilaterally, no wheezing  Cardiovascular: S1 and S2, IRR, +murmur, no rubs or gallops  Gastrointestinal: soft, non-tender, non-distended, +bowel sounds, no rebound or guarding, no surgical scars, no drains  Extremities: No peripheral edema, no cyanosis or clubbing  Vascular: 2+ peripheral pulses, no venous stasis  Neurological: A/O x 3, no focal deficits, no asterixis  Psychiatric: Normal mood, normal affect  Skin: No rashes, not jaundiced    LABS:                        8.7    5.83  )-----------( 172      ( 04 Apr 2023 05:30 )             27.3     04-04    138  |  106  |  86<H>  ----------------------------<  93  4.1   |  27  |  1.71<H>    Ca    8.3<L>      04 Apr 2023 05:30  Phos  3.8     04-04  Mg     2.2     04-04    TPro  5.8<L>  /  Alb  2.5<L>  /  TBili  0.6  /  DBili  x   /  AST  18  /  ALT  15  /  AlkPhos  61  04-04    PT/INR - ( 04 Apr 2023 05:30 )   PT: 18.7 sec;   INR: 1.60 ratio         PTT - ( 03 Apr 2023 18:07 )  PTT:37.2 sec  LIVER FUNCTIONS - ( 04 Apr 2023 05:30 )  Alb: 2.5 g/dL / Pro: 5.8 gm/dL / ALK PHOS: 61 U/L / ALT: 15 U/L / AST: 18 U/L / GGT: x             RADIOLOGY & ADDITIONAL STUDIES:  IMPRESSION:  Cardiomegaly, bilateral pleural effusions and pulmonary findings   suggesting mild pulmonary edema.    No acute abdominal pathology.    Interval growth of a left lower pole solid renal mass previously   characterized as renal cell carcinoma.

## 2023-04-04 NOTE — H&P ADULT - SKIN COMMENTS
Skin pale, lips and mucous membranes pale and dry. Finger tips dusky and cold to touch, distal extremities mottled.

## 2023-04-04 NOTE — DIETITIAN INITIAL EVALUATION ADULT - NS FNS DIET ORDER
Diet, NPO:   Except Medications  With Ice Chips/Sips of Water (04-04-23 @ 03:32)   Diet, NPO:   Except Medications  With Ice Chips/Sips of Water (04-04-23 @ 03:32)

## 2023-04-04 NOTE — DIETITIAN INITIAL EVALUATION ADULT - NAME AND PHONE
Darlene Encarnacion, Dietetic Intern Darlene Encarnacion, Dietetic Intern  Benita Child, MS, RDN, [phone]

## 2023-04-04 NOTE — DIETITIAN INITIAL EVALUATION ADULT - [id number]_GEN_A_CORE
I&O's Detail    03 Apr 2023 07:01  -  04 Apr 2023 07:00  --------------------------------------------------------  IN:    Plasma: 298 mL    PRBCs (Packed Red Blood Cells): 280 mL  Total IN: 578 mL    OUT:  Total OUT: 0 mL    Total NET: 578 mL

## 2023-04-04 NOTE — DIETITIAN NUTRITION RISK NOTIFICATION - MALNUTRITION EVALUATION AS DEMONSTRATED BY (ADULTS > 20 YEARS OF AGE)
Inadequate energy intake.../Loss of subcutaneous fat.../Loss of muscle... Opioid Pregnancy And Lactation Text: These medications can lead to premature delivery and should be avoided during pregnancy. These medications are also present in breast milk in small amounts.

## 2023-04-04 NOTE — PATIENT PROFILE ADULT - FALL HARM RISK - HARM RISK INTERVENTIONS

## 2023-04-04 NOTE — H&P ADULT - CONVERSATION DETAILS
Spoke with patient's family in regards to advanced directives. Patient reportedly has HCP form completed previously appointing both sons as HCP. Came with [facility] completed 4/3 by PMD for DNR/DNI, no feeding tubes, no HD. MOLST also states comfort measures only -- confirmed with family they would want limited medical interventions with a trial of IV fluids and antibiotics, a trial of peripheral vasopressors if needed, no heroic measures, no central lines. Expressed that if patient's condition deteriorates, their wish is for him to be made comfort measures to ensure peaceful passing.    MOLST updated to reflect above changes

## 2023-04-04 NOTE — H&P ADULT - ASSESSMENT
86 yo male, PMHx AFib on Xarelto (last dose 4/2 PM), HFrEF (TTE 2/13/23 EF 35-40%, dHF, mod MR/AR, severe TR, severe pHTN normal RV), CAD/PCI, prior AMI, HTN, renal cell carcinoma (observation), CKD, who presented from home to the ED for evaluation of hypotension and lethargy.    Admit to ICU with hypovolemic shock secondary to acute blood loss anemia due to presumed upper GI bleed ? in the setting of coagulopathy with lactic acidosis, YUE on CKD   No overt GI bleeding observed at home, and none since being in ED   Sent for STAT CT chest/abd/pelv, bilateral pleural effusions L > R noted, pneumobilia stable, and Left renal mass increased in size (family informed of this) -- no traumatic hemothorax from recent fall or RP bleed noted  Transfused 2u PRBC initially with improvement in hemodynamics and mentation  Patient reassessed multiple times, developed recurrent shock with BP 60/40 repeatedly. Still with no overt bleeding.  Ordered empirically for 3rd unit PRBC, 1u FFP   Repeat CBC, lactate  Start vasopressors if needed to keep MAP > 65  Monitoring end points of perfusion in response to resuscitation  PPI BID   GI consult   Empiric antibiotics for UTI with Ceftriaxone, urine culture sent and is pending   SCDs for DVT ppx      Discussed with eICU Attending Dr. Turcios     CRITICAL CARE TIME SPENT: 95 minutes assessing presenting problems of acute critical illness, which pose high probability of life threatening deterioration or end organ damage/dysfunction, requiring frequent bedside reassessments and adjustments to plan of care, including medical decision making, initiating plan of care, reviewing data, reviewing radiologic exams, discussing with multidisciplinary team, discussing goals of care. Critical Care time is non-inclusive of independent time spent on procedures performed.

## 2023-04-04 NOTE — DIETITIAN INITIAL EVALUATION ADULT - ORAL INTAKE PTA/DIET HISTORY
Lives at home w/ son as per H&P. Unable to obtain diet hx 2/2 [place] and ? historian.  Reports having a good appetite.  Lives at home w/ son who is the caretaker as per H&P. States appetite has been good, unable to obtain diet hx 2/2 [place]. As per H&P, pt has been hypotensive x last 5 days w/ condition progressively worsening and lethargy noted, likely consuming <50% ENN x 5 days. Reports of intermittent melena since 12/2022, although son states recent BMs have been normal as per H&P.

## 2023-04-04 NOTE — PROGRESS NOTE ADULT - ASSESSMENT
A/P: 69 male presenting with lethergy and anemia from a likely GIB    plan:  ICU  NPO  Serial CBC  No RP bleed on CT  For EGD today  BID PPI  Venodynes  D/W GI

## 2023-04-05 LAB
ANION GAP SERPL CALC-SCNC: 5 MMOL/L — SIGNIFICANT CHANGE UP (ref 5–17)
BUN SERPL-MCNC: 74 MG/DL — HIGH (ref 7–23)
CALCIUM SERPL-MCNC: 8.9 MG/DL — SIGNIFICANT CHANGE UP (ref 8.5–10.1)
CHLORIDE SERPL-SCNC: 109 MMOL/L — HIGH (ref 96–108)
CO2 SERPL-SCNC: 24 MMOL/L — SIGNIFICANT CHANGE UP (ref 22–31)
CREAT SERPL-MCNC: 1.52 MG/DL — HIGH (ref 0.5–1.3)
EGFR: 44 ML/MIN/1.73M2 — LOW
GLUCOSE SERPL-MCNC: 100 MG/DL — HIGH (ref 70–99)
HCT VFR BLD CALC: 30 % — LOW (ref 39–50)
HCT VFR BLD CALC: 30.1 % — LOW (ref 39–50)
HGB BLD-MCNC: 9.3 G/DL — LOW (ref 13–17)
HGB BLD-MCNC: 9.4 G/DL — LOW (ref 13–17)
MAGNESIUM SERPL-MCNC: 2.2 MG/DL — SIGNIFICANT CHANGE UP (ref 1.6–2.6)
MCHC RBC-ENTMCNC: 29.5 PG — SIGNIFICANT CHANGE UP (ref 27–34)
MCHC RBC-ENTMCNC: 30.2 PG — SIGNIFICANT CHANGE UP (ref 27–34)
MCHC RBC-ENTMCNC: 30.9 GM/DL — LOW (ref 32–36)
MCHC RBC-ENTMCNC: 31.3 GM/DL — LOW (ref 32–36)
MCV RBC AUTO: 94 FL — SIGNIFICANT CHANGE UP (ref 80–100)
MCV RBC AUTO: 97.7 FL — SIGNIFICANT CHANGE UP (ref 80–100)
PHOSPHATE SERPL-MCNC: 3.4 MG/DL — SIGNIFICANT CHANGE UP (ref 2.5–4.5)
PLATELET # BLD AUTO: 203 K/UL — SIGNIFICANT CHANGE UP (ref 150–400)
PLATELET # BLD AUTO: 250 K/UL — SIGNIFICANT CHANGE UP (ref 150–400)
POTASSIUM SERPL-MCNC: 3.9 MMOL/L — SIGNIFICANT CHANGE UP (ref 3.5–5.3)
POTASSIUM SERPL-SCNC: 3.9 MMOL/L — SIGNIFICANT CHANGE UP (ref 3.5–5.3)
RBC # BLD: 3.08 M/UL — LOW (ref 4.2–5.8)
RBC # BLD: 3.19 M/UL — LOW (ref 4.2–5.8)
RBC # FLD: 18.1 % — HIGH (ref 10.3–14.5)
RBC # FLD: 18.3 % — HIGH (ref 10.3–14.5)
SODIUM SERPL-SCNC: 138 MMOL/L — SIGNIFICANT CHANGE UP (ref 135–145)
WBC # BLD: 6.72 K/UL — SIGNIFICANT CHANGE UP (ref 3.8–10.5)
WBC # BLD: 6.9 K/UL — SIGNIFICANT CHANGE UP (ref 3.8–10.5)
WBC # FLD AUTO: 6.72 K/UL — SIGNIFICANT CHANGE UP (ref 3.8–10.5)
WBC # FLD AUTO: 6.9 K/UL — SIGNIFICANT CHANGE UP (ref 3.8–10.5)

## 2023-04-05 PROCEDURE — 99291 CRITICAL CARE FIRST HOUR: CPT

## 2023-04-05 PROCEDURE — 99232 SBSQ HOSP IP/OBS MODERATE 35: CPT

## 2023-04-05 RX ORDER — FINASTERIDE 5 MG/1
5 TABLET, FILM COATED ORAL DAILY
Refills: 0 | Status: DISCONTINUED | OUTPATIENT
Start: 2023-04-05 | End: 2023-04-08

## 2023-04-05 RX ORDER — ATORVASTATIN CALCIUM 80 MG/1
10 TABLET, FILM COATED ORAL AT BEDTIME
Refills: 0 | Status: DISCONTINUED | OUTPATIENT
Start: 2023-04-05 | End: 2023-04-08

## 2023-04-05 RX ORDER — MIDODRINE HYDROCHLORIDE 2.5 MG/1
10 TABLET ORAL EVERY 8 HOURS
Refills: 0 | Status: DISCONTINUED | OUTPATIENT
Start: 2023-04-05 | End: 2023-04-06

## 2023-04-05 RX ORDER — LEVOTHYROXINE SODIUM 125 MCG
150 TABLET ORAL DAILY
Refills: 0 | Status: DISCONTINUED | OUTPATIENT
Start: 2023-04-05 | End: 2023-04-08

## 2023-04-05 RX ADMIN — MIDODRINE HYDROCHLORIDE 5 MILLIGRAM(S): 2.5 TABLET ORAL at 08:52

## 2023-04-05 RX ADMIN — FINASTERIDE 5 MILLIGRAM(S): 5 TABLET, FILM COATED ORAL at 15:08

## 2023-04-05 RX ADMIN — PHENYLEPHRINE HYDROCHLORIDE 4.28 MICROGRAM(S)/KG/MIN: 10 INJECTION INTRAVENOUS at 15:01

## 2023-04-05 RX ADMIN — PANTOPRAZOLE SODIUM 40 MILLIGRAM(S): 20 TABLET, DELAYED RELEASE ORAL at 21:02

## 2023-04-05 RX ADMIN — MIDODRINE HYDROCHLORIDE 10 MILLIGRAM(S): 2.5 TABLET ORAL at 15:07

## 2023-04-05 RX ADMIN — MIDODRINE HYDROCHLORIDE 10 MILLIGRAM(S): 2.5 TABLET ORAL at 21:02

## 2023-04-05 RX ADMIN — CEFTRIAXONE 1000 MILLIGRAM(S): 500 INJECTION, POWDER, FOR SOLUTION INTRAMUSCULAR; INTRAVENOUS at 17:44

## 2023-04-05 RX ADMIN — ATORVASTATIN CALCIUM 10 MILLIGRAM(S): 80 TABLET, FILM COATED ORAL at 21:01

## 2023-04-05 RX ADMIN — CHLORHEXIDINE GLUCONATE 1 APPLICATION(S): 213 SOLUTION TOPICAL at 06:24

## 2023-04-05 RX ADMIN — PANTOPRAZOLE SODIUM 40 MILLIGRAM(S): 20 TABLET, DELAYED RELEASE ORAL at 09:59

## 2023-04-05 NOTE — PROGRESS NOTE ADULT - SUBJECTIVE AND OBJECTIVE BOX
Patient is a 87y old  Male who presents with a chief complaint of Hypotension     (2023 11:53)      BRIEF HOSPITAL COURSE: 88 yo male, PMHx AFib on Xarelto (last dose  PM), HFrEF (TTE 23 EF 35-40%, dHF, mod MR/AR, severe TR, severe pHTN normal RV), CAD/PCI, prior AMI, HTN, renal cell carcinoma (observation), CKD, who presented from home to the ED for evaluation of hypotension and lethargy. Patient's children at bedside report that he has been hypotensive intermittently for the past 5 days, They stopped his Atenolol, however his condition significantly worsened over the past 2-3 days, with increasing lethargy. He spent the entirety of the day prior to arrival sleeping. They have also noted significant progressive SOUSA, only able to walk a few feet prior to becoming markedly dyspneic with associated LE edema. Denies fever or chills, abdominal pain, melena, epistaxis (last 2022), vomiting, chest pain. Family reports patient suffered a fall a few days ago, tripped and fell backwards, no headstrike, hit left arm on TV console but did not seek medical care.    Upon arrival to ED, patient was found to be hypotensive and markedly anemic with H/H 6.0/20, lactate 2.2-2.7. FOB+. Per family, patient's Hgb has been slowly drifting down, most recently 9/6 on 3/24/23. He has had intermittent melena since having COVID in 2022, and was recently hospitalized at  2023 to evaluate for large volume of melena noted at home, however H/H remained stable and patient had no further GI bleeding while admitted, therefore melena was attributed to epistaxis and patient was discharged. Patient's sons live with him and assist in his care, and have been checking his stools - they report brown stool, deny hematochezia or melena. Patient was resuscitated with 1L crystalloid and remained hypotensive, prompting ICU consult. On evaluation, patient was lethargic but arousable. Hypotensive SBP 70-80's, ordered for 2u PRBC.    Events last 24 hours: Remains on Edis gtt, rate controlled AFIB, s/p EGD, Hgb stable    PAST MEDICAL & SURGICAL HISTORY:  CAD (coronary artery disease)      S/P coronary artery stent placement      Afib        Medications:  cefTRIAXone Injectable. 1000 milliGRAM(s) IV Push every 24 hours    metoprolol tartrate Injectable 5 milliGRAM(s) IV Push once  midodrine 5 milliGRAM(s) Oral every 8 hours  phenylephrine    Infusion 0.2 MICROgram(s)/kG/Min IV Continuous <Continuous>            pantoprazole  Injectable 40 milliGRAM(s) IV Push every 12 hours      levothyroxine Injectable 112 MICROGram(s) IV Push at bedtime        chlorhexidine 4% Liquid 1 Application(s) Topical <User Schedule>            ICU Vital Signs Last 24 Hrs  T(C): 36.3 (2023 00:00), Max: 37.1 (2023 03:35)  T(F): 97.4 (2023 00:00), Max: 98.7 (2023 03:35)  HR: 88 (2023 00:30) (71 - 138)  BP: 90/78 (2023 00:30) (69/56 - 105/53)  BP(mean): 84 (2023 00:30) (56 - 92)  ABP: --  ABP(mean): --  RR: 23 (2023 00:30) (11 - 23)  SpO2: 93% (2023 00:30) (88% - 100%)    O2 Parameters below as of 2023 20:45  Patient On (Oxygen Delivery Method): nasal cannula  O2 Flow (L/min): 2              I&O's Detail    2023 07:01  -  2023 07:00  --------------------------------------------------------  IN:    Plasma: 298 mL    PRBCs (Packed Red Blood Cells): 280 mL  Total IN: 578 mL    OUT:  Total OUT: 0 mL    Total NET: 578 mL      2023 07:01  -  2023 01:46  --------------------------------------------------------  IN:  Total IN: 0 mL    OUT:    Voided (mL): 500 mL  Total OUT: 500 mL    Total NET: -500 mL          LABS:                        9.3    6.72  )-----------( 203      ( 2023 00:58 )             30.1     04-04    138  |  106  |  86<H>  ----------------------------<  93  4.1   |  27  |  1.71<H>    Ca    8.3<L>      2023 05:30  Phos  3.8     04-04  Mg     2.2     04-04    TPro  5.8<L>  /  Alb  2.5<L>  /  TBili  0.6  /  DBili  x   /  AST  18  /  ALT  15  /  AlkPhos  61  04-04          CAPILLARY BLOOD GLUCOSE        PT/INR - ( 2023 05:30 )   PT: 18.7 sec;   INR: 1.60 ratio         PTT - ( 2023 18:07 )  PTT:37.2 sec  Urinalysis Basic - ( 2023 22:42 )    Color: Yellow / Appearance: Clear / S.010 / pH: x  Gluc: x / Ketone: Negative  / Bili: Negative / Urobili: Negative   Blood: x / Protein: Trace mg/dL / Nitrite: Negative   Leuk Esterase: Trace / RBC: >50 /HPF / WBC 11-25 /HPF   Sq Epi: x / Non Sq Epi: Few / Bacteria: Occasional      CULTURES:      Physical Examination:      General: Well appearing, lying in bed in NAD.      HEENT: Pupils equal, reactive to light. Symmetric. No scleral icterus or injection.    PULM: Clear to auscultation B/L. No wheezes, rales, or rhonchi apprecaited. No significant sputum production or increased respiratory effort.    NECK: Supple, no lymphadenopathy, trachea midline.    CVS: Regular rate and rhythm, no murmurs appreciated, +s1/s2.    ABD: Soft, nondistended, nontender, normoactive bowel sounds.    EXT: 2+ pitting edema, nontender.    SKIN: Warm and well perfused, no rashes noted.    NEURO: Alert, oriented, interactive, nonfocal.    RADIOLOGY: < from: CT Abdomen and Pelvis No Cont (23 @ 23:40) >    ACC: [id number] EXAM:  CT ABDOMEN AND PELVIS   ORDERED BY: TU CAST     ACC: [id number] EXAM:  CT CHEST   ORDERED BY: TU CAST     PROCEDURE DATE:  2023          INTERPRETATION:  CLINICAL INFORMATION: Hypotension. Lethargy. Dyspnea on   exertion.    COMPARISON: CT abdomen pelvis 2015 and MRI abdomen 2021    CONTRAST/COMPLICATIONS:  IV Contrast: NONE  Oral Contrast: NONE  Complications: None reported at time of study completion    PROCEDURE:  CT of the Chest, Abdomen and Pelvis was performed.  Sagittal and coronal reformats were performed.    FINDINGS:  CHEST:  LUNGS AND LARGE AIRWAYS: Patent central airways. Subsegmental lingular   atelectasis. Bilateral lower lobe subsegmental compressive atelectasis   adjacent to pleural effusions. Nonspecific patchy bilateral groundglass   opacities and mild interlobular septal thickening.  PLEURA: Small bilateral simple pleural effusions.  VESSELS: Atherosclerotic changes of the aorta and coronary arteries.  HEART: Cardiomegaly. No pericardial effusion.  MEDIASTINUM AND KAIT: No lymphadenopathy.  CHEST WALL AND LOWER NECK: Within normal limits.    ABDOMEN AND PELVIS:  LIVER: Within normal limits.  BILE DUCTS: Redemonstration of pneumobilia. Stable bilateral internal and   extra hepatic bile duct dilatation.  GALLBLADDER: Cholecystectomy.  SPLEEN: Within normal limits.  PANCREAS: Within normal limits.  ADRENALS: Within normal limits.  KIDNEYS/URETERS: Interval growth of a left lower pole solid renal mass   measuring 5.6 x 4.8 cm, (previously 4.8 x 3.8 cm on MRI 2021).   Additional bilateral simple and hyperdense cysts are again noted.   Evaluation is limited by lack of IV contrast. Oral renal stones or   hydronephrosis.    BLADDER: Within normal limits.  REPRODUCTIVE ORGANS: Prostate within normal limits.    BOWEL: No bowel obstruction. Appendix is not visualized. No evidence of   inflammation in the pericecal region.  PERITONEUM: No ascites.  VESSELS: Atherosclerotic changes.  RETROPERITONEUM/LYMPH NODES: No lymphadenopathy.  ABDOMINAL WALL: Small fat-containing left inguinal hernia..  BONES: Degenerative changes.    IMPRESSION:  Cardiomegaly, bilateral pleural effusions and pulmonary findings   suggesting mild pulmonary edema.    No acute abdominal pathology.    Interval growth of a left lower pole solid renal mass previously   characterized as renal cell carcinoma.    --- End of Report ---          < end of copied text >

## 2023-04-05 NOTE — PROGRESS NOTE ADULT - ASSESSMENT
88 yo male, PMHx AFib on Xarelto (last dose 4/2 PM), HFrEF (TTE 2/13/23 EF 35-40%, dHF, mod MR/AR, severe TR, severe pHTN normal RV), CAD/PCI, prior AMI, HTN, renal cell carcinoma (observation), CKD admitted to ICU for    # Shock, Hypovolemic  # ABLA  # GIB  # YUE on CKD  # lactic Acidosis Type A    Dispo: DNR/DNI    Neuro:  - Avoid Neuro Deliriogenic / sedative medications    CV:  - actively titrating Edis to maintain MAP >65m midodrine added 5mg Q8H to help facilitate weaning off pressors  - Rate controlled AFIB, hold full AC in setting of GIB    Pulm:  - Supplemental oxygen as needed to maintain SpO2 > 92%,  - Incentive spirometry  - Blood found on vocals cords during EGD, may need bronch v ENT when stabilized                  GI:  - PPI BID  - NPO except meds  - EGD: 7 mm duodenal bulb ulcer. 5 mm gastric/pyloric channel ulcer. Test and treat for H Pylori as per GI      Renal:  - Improving Renal Function Continue to monitor Bun/Cr and UOP  - Replacing electrolytes as needed with Goal K> 4, PO> 3, Mg> 2               - Strict I&O's  - Avoid Nephro toxic medication  - Renally dose meds    Heme:  - SCDs for DVT/PE ppx   - s/p 2 unit PRBC, Trend H/H goal hgb > 8 in setting of GIB    ID:  - Ceftriaxone  - Microbiology and Radiology reviewed   - trend CBC with diff, CMP  and fever curve    Endo:  - ISS for aggressive glycemic control to limit FS glucose to < 180mg/dl.  - Keep Euthyroid    Critical Care Time (EXCLUSIVE of any non bundled procedures) :  45 minutes were spent assessing the patient's presenting problems of acute illness that pose a high probability of life threatening  deterioration or end organ damage / dysfunction.  Medical desicion making includes initiation / continuation of plan or care review data/ labwork/ radiographic study, direct patient care bedside ,  discussions with  consultants regarding care,  evaluation and interpretation of vital signs, any necessary ventilator management,   NIV or BIPAP changes  or initiation,    discussions with multidisipliary team,  am or pm rounds, discussions of goals of care with patient and family all non-inclusive of procedures.

## 2023-04-05 NOTE — PROGRESS NOTE ADULT - NS ATTEND AMEND GEN_ALL_CORE FT
87 year old man with melena and anemia, found to have peptic ulcer disease.     BID PPI x 2 months  test and treat for h pylori

## 2023-04-05 NOTE — PROGRESS NOTE ADULT - SUBJECTIVE AND OBJECTIVE BOX
Patient is a 87y old  Male who presents with a chief complaint of melena    Followup: sp EGD with duod bulb ulcer and ulcer at gastric/pyloric channel  Evaluated at bedside, confused and agitated "You arrested my sons, bring them back," pulling at ekg wires etc.     MEDICATIONS  (STANDING):  atorvastatin 10 milliGRAM(s) Oral at bedtime  cefTRIAXone Injectable. 1000 milliGRAM(s) IV Push every 24 hours  chlorhexidine 4% Liquid 1 Application(s) Topical <User Schedule>  finasteride 5 milliGRAM(s) Oral daily  levothyroxine 150 MICROGram(s) Oral daily  midodrine 10 milliGRAM(s) Oral every 8 hours  pantoprazole  Injectable 40 milliGRAM(s) IV Push every 12 hours  phenylephrine    Infusion 0.2 MICROgram(s)/kG/Min (4.28 mL/Hr) IV Continuous <Continuous>    MEDICATIONS  (PRN):    Vital Signs Last 24 Hrs  T(C): 36.7 (05 Apr 2023 08:00), Max: 36.7 (05 Apr 2023 08:00)  T(F): 98.1 (05 Apr 2023 08:00), Max: 98.1 (05 Apr 2023 08:00)  HR: 78 (05 Apr 2023 11:30) (71 - 147)  BP: 86/51 (05 Apr 2023 11:30) (69/56 - 114/67)  BP(mean): 62 (05 Apr 2023 11:30) (55 - 96)  RR: 19 (05 Apr 2023 11:30) (11 - 23)  SpO2: 99% (05 Apr 2023 11:30) (68% - 100%)    Parameters below as of 05 Apr 2023 10:00  Patient On (Oxygen Delivery Method): room air        PHYSICAL EXAM:    Constitutional: No acute distress, elderly, non-toxic appearing  HEENT: masked, good phonation, not icteric  Neck: supple, no lymphadenopathy  Respiratory: clear to ascultation bilaterally, no wheezing  Cardiovascular: S1 and S2, irregular rate and rhythm, no murmurs rubs or gallops  Gastrointestinal: soft, non-tender, non-distended, +bowel sounds, no rebound or guarding, no surgical scars, no drains  Extremities: No peripheral edema, no cyanosis or clubbing  Vascular: 2+ peripheral pulses, no venous stasis  Neurological: alert, confused, no focal deficits, no asterixis  Psychiatric: agitated  Skin: No rashes, not jaundiced    LABS:                        9.4    6.90  )-----------( 250      ( 05 Apr 2023 05:24 )             30.0     04-05    138  |  109<H>  |  74<H>  ----------------------------<  100<H>  3.9   |  24  |  1.52<H>    Ca    8.9      05 Apr 2023 05:24  Phos  3.4     04-05  Mg     2.2     04-05    TPro  5.8<L>  /  Alb  2.5<L>  /  TBili  0.6  /  DBili  x   /  AST  18  /  ALT  15  /  AlkPhos  61  04-04    PT/INR - ( 04 Apr 2023 05:30 )   PT: 18.7 sec;   INR: 1.60 ratio         PTT - ( 03 Apr 2023 18:07 )  PTT:37.2 sec  LIVER FUNCTIONS - ( 04 Apr 2023 05:30 )  Alb: 2.5 g/dL / Pro: 5.8 gm/dL / ALK PHOS: 61 U/L / ALT: 15 U/L / AST: 18 U/L / GGT: x             RADIOLOGY & ADDITIONAL STUDIES: Patient is a 87y old  Male who presents with a chief complaint of melena    Followup: sp EGD with duod bulb ulcer and ulcer at gastric/pyloric channel  Evaluated at bedside, confused and agitated "You arrested my sons, bring them back," pulling at ekg wires etc.     MEDICATIONS  (STANDING):  atorvastatin 10 milliGRAM(s) Oral at bedtime  cefTRIAXone Injectable. 1000 milliGRAM(s) IV Push every 24 hours  chlorhexidine 4% Liquid 1 Application(s) Topical <User Schedule>  finasteride 5 milliGRAM(s) Oral daily  levothyroxine 150 MICROGram(s) Oral daily  midodrine 10 milliGRAM(s) Oral every 8 hours  pantoprazole  Injectable 40 milliGRAM(s) IV Push every 12 hours  phenylephrine    Infusion 0.2 MICROgram(s)/kG/Min (4.28 mL/Hr) IV Continuous <Continuous>    MEDICATIONS  (PRN):    Vital Signs Last 24 Hrs  T(C): 36.7 (05 Apr 2023 08:00), Max: 36.7 (05 Apr 2023 08:00)  T(F): 98.1 (05 Apr 2023 08:00), Max: 98.1 (05 Apr 2023 08:00)  HR: 78 (05 Apr 2023 11:30) (71 - 147)  BP: 86/51 (05 Apr 2023 11:30) (69/56 - 114/67)  BP(mean): 62 (05 Apr 2023 11:30) (55 - 96)  RR: 19 (05 Apr 2023 11:30) (11 - 23)  SpO2: 99% (05 Apr 2023 11:30) (68% - 100%)    Parameters below as of 05 Apr 2023 10:00  Patient On (Oxygen Delivery Method): room air        PHYSICAL EXAM:    Constitutional: No acute distress, elderly, non-toxic appearing  HEENT: masked, good phonation, not icteric  Neck: supple, no lymphadenopathy  Respiratory: clear to ascultation bilaterally, no wheezing  Cardiovascular: S1 and S2, irregular rate and rhythm, no murmurs rubs or gallops  Gastrointestinal: soft, non-tender, non-distended, +bowel sounds, no rebound or guarding, no surgical scars, no drains  Extremities: No peripheral edema, no cyanosis or clubbing  Vascular: 2+ peripheral pulses, no venous stasis  Neurological: alert, confused, no focal deficits, no asterixis  Psychiatric: agitated  Skin: No rashes, not jaundiced    LABS:                        9.4    6.90  )-----------( 250      ( 05 Apr 2023 05:24 )             30.0     04-05    138  |  109<H>  |  74<H>  ----------------------------<  100<H>  3.9   |  24  |  1.52<H>    Ca    8.9      05 Apr 2023 05:24  Phos  3.4     04-05  Mg     2.2     04-05    TPro  5.8<L>  /  Alb  2.5<L>  /  TBili  0.6  /  DBili  x   /  AST  18  /  ALT  15  /  AlkPhos  61  04-04    PT/INR - ( 04 Apr 2023 05:30 )   PT: 18.7 sec;   INR: 1.60 ratio         PTT - ( 03 Apr 2023 18:07 )  PTT:37.2 sec  LIVER FUNCTIONS - ( 04 Apr 2023 05:30 )  Alb: 2.5 g/dL / Pro: 5.8 gm/dL / ALK PHOS: 61 U/L / ALT: 15 U/L / AST: 18 U/L / GGT: x

## 2023-04-05 NOTE — PROGRESS NOTE ADULT - SUBJECTIVE AND OBJECTIVE BOX
HPI:  86 yo male, PMHx AFib on Xarelto (last dose  PM), HFrEF (TTE 23 EF 35-40%, dHF, mod MR/AR, severe TR, severe pHTN normal RV), CAD/PCI, prior AMI, HTN, renal cell carcinoma (observation), CKD, who presented from home to the ED for evaluation of hypotension and lethargy. Patient's children at bedside report that he has been hypotensive intermittently for the past 5 days, They stopped his Atenolol, however his condition significantly worsened over the past 2-3 days, with increasing lethargy. He spent the entirety of the day prior to arrival sleeping. They have also noted significant progressive SOUSA, only able to walk a few feet prior to becoming markedly dyspneic with associated LE edema. Denies fever or chills, abdominal pain, melena, epistaxis (last 2022), vomiting, chest pain. Family reports patient suffered a fall a few days ago, tripped and fell backwards, no headstrike, hit left arm on TV console but did not seek medical care.    Upon arrival to ED, patient was found to be hypotensive and markedly anemic with H/H 6.0/20, lactate 2.2-2.7. FOB+. Per family, patient's Hgb has been slowly drifting down, most recently  on 3/24/23. He has had intermittent melena since having COVID in 2022, and was recently hospitalized at  2023 to evaluate for large volume of melena noted at home, however H/H remained stable and patient had no further GI bleeding while admitted, therefore melena was attributed to epistaxis and patient was discharged. Patient's sons live with him and assist in his care, and have been checking his stools - they report brown stool, deny hematochezia or melena. Patient was resuscitated with 1L crystalloid and remained hypotensive, prompting ICU consult. On evaluation, patient was lethargic but arousable. Hypotensive SBP 70-80's, ordered for 2u PRBC.     : Patient S/P 2U PRBC.  For EGD today  : PAtient H & H stable.  he remains on pressors, cultures negative thus far           PAST MEDICAL & SURGICAL HISTORY:  CAD (coronary artery disease)      S/P coronary artery stent placement      Afib          FAMILY HISTORY:      Social Hx:    Allergies    No Known Allergies    Intolerances          Weight (kg): 71.7 ( @ 22:30)    ICU Vital Signs Last 24 Hrs  T(C): 36.7 (2023 08:00), Max: 36.7 (2023 08:00)  T(F): 98.1 (2023 08:00), Max: 98.1 (2023 08:00)  HR: 96 (2023 10:30) (71 - 147)  BP: 82/59 (2023 10:30) (69/56 - 114/67)  BP(mean): 67 (2023 10:30) (55 - 96)  ABP: --  ABP(mean): --  RR: 14 (2023 10:30) (11 - 23)  SpO2: 100% (2023 10:30) (68% - 100%)    O2 Parameters below as of 2023 10:00  Patient On (Oxygen Delivery Method): room air                I&O's Summary    2023 07:01  -  2023 07:00  --------------------------------------------------------  IN: 97 mL / OUT: 900 mL / NET: -803 mL                              9.4    6.90  )-----------( 250      ( 2023 05:24 )             30.0       04-05    138  |  109<H>  |  74<H>  ----------------------------<  100<H>  3.9   |  24  |  1.52<H>    Ca    8.9      2023 05:24  Phos  3.4     04-05  Mg     2.2     04-05    TPro  5.8<L>  /  Alb  2.5<L>  /  TBili  0.6  /  DBili  x   /  AST  18  /  ALT  15  /  AlkPhos  61  04-04                Urinalysis Basic - ( 2023 22:42 )    Color: Yellow / Appearance: Clear / S.010 / pH: x  Gluc: x / Ketone: Negative  / Bili: Negative / Urobili: Negative   Blood: x / Protein: Trace mg/dL / Nitrite: Negative   Leuk Esterase: Trace / RBC: >50 /HPF / WBC 11-25 /HPF   Sq Epi: x / Non Sq Epi: Few / Bacteria: Occasional        MEDICATIONS  (STANDING):  atorvastatin 10 milliGRAM(s) Oral at bedtime  cefTRIAXone Injectable. 1000 milliGRAM(s) IV Push every 24 hours  chlorhexidine 4% Liquid 1 Application(s) Topical <User Schedule>  finasteride 5 milliGRAM(s) Oral daily  levothyroxine 150 MICROGram(s) Oral daily  midodrine 10 milliGRAM(s) Oral every 8 hours  pantoprazole  Injectable 40 milliGRAM(s) IV Push every 12 hours  phenylephrine    Infusion 0.2 MICROgram(s)/kG/Min (4.28 mL/Hr) IV Continuous <Continuous>    MEDICATIONS  (PRN):      DVT Prophylaxis:    Advanced Directives:  Discussed with:    Visit Information: 30 min    ** Time is exclusive of billed procedures and/or teaching and/or routine family updates.

## 2023-04-05 NOTE — PROGRESS NOTE ADULT - ASSESSMENT
87 year old male with multiple medical issues, recurrent symptomatic anemia with fall on chronic AC with melena    Imp: duodenal and gastric/pyloric channel ulcers  Of note, blood seen on vocal cords---> rec ENT versus bronch for further evaluation     Rec:  ::PPI BID  ::Adv diet as ryanne

## 2023-04-05 NOTE — PROGRESS NOTE ADULT - ASSESSMENT
A/P: 69 male presenting with lethergy and anemia from a likely GIB    plan:  ICU  PO Diet  H & H stable  No RP bleed on CT  DU and Gastric channel ulcer.  nonbleeding  BID PPI  Some blood noted at the cords during endoscopy.  Will D/W the patients jeb Black  D/W GI

## 2023-04-06 ENCOUNTER — TRANSCRIPTION ENCOUNTER (OUTPATIENT)
Age: 88
End: 2023-04-06

## 2023-04-06 LAB
ANION GAP SERPL CALC-SCNC: 6 MMOL/L — SIGNIFICANT CHANGE UP (ref 5–17)
BUN SERPL-MCNC: 66 MG/DL — HIGH (ref 7–23)
CALCIUM SERPL-MCNC: 8.8 MG/DL — SIGNIFICANT CHANGE UP (ref 8.5–10.1)
CHLORIDE SERPL-SCNC: 110 MMOL/L — HIGH (ref 96–108)
CO2 SERPL-SCNC: 25 MMOL/L — SIGNIFICANT CHANGE UP (ref 22–31)
CREAT SERPL-MCNC: 1.56 MG/DL — HIGH (ref 0.5–1.3)
EGFR: 43 ML/MIN/1.73M2 — LOW
GLUCOSE SERPL-MCNC: 106 MG/DL — HIGH (ref 70–99)
HCT VFR BLD CALC: 32.2 % — LOW (ref 39–50)
HGB BLD-MCNC: 9.9 G/DL — LOW (ref 13–17)
MAGNESIUM SERPL-MCNC: 2.1 MG/DL — SIGNIFICANT CHANGE UP (ref 1.6–2.6)
MCHC RBC-ENTMCNC: 29.7 PG — SIGNIFICANT CHANGE UP (ref 27–34)
MCHC RBC-ENTMCNC: 30.7 GM/DL — LOW (ref 32–36)
MCV RBC AUTO: 96.7 FL — SIGNIFICANT CHANGE UP (ref 80–100)
PHOSPHATE SERPL-MCNC: 3.3 MG/DL — SIGNIFICANT CHANGE UP (ref 2.5–4.5)
PLATELET # BLD AUTO: 223 K/UL — SIGNIFICANT CHANGE UP (ref 150–400)
POTASSIUM SERPL-MCNC: 4 MMOL/L — SIGNIFICANT CHANGE UP (ref 3.5–5.3)
POTASSIUM SERPL-SCNC: 4 MMOL/L — SIGNIFICANT CHANGE UP (ref 3.5–5.3)
RBC # BLD: 3.33 M/UL — LOW (ref 4.2–5.8)
RBC # FLD: 18.4 % — HIGH (ref 10.3–14.5)
SODIUM SERPL-SCNC: 141 MMOL/L — SIGNIFICANT CHANGE UP (ref 135–145)
WBC # BLD: 6.96 K/UL — SIGNIFICANT CHANGE UP (ref 3.8–10.5)
WBC # FLD AUTO: 6.96 K/UL — SIGNIFICANT CHANGE UP (ref 3.8–10.5)

## 2023-04-06 PROCEDURE — 99497 ADVNCD CARE PLAN 30 MIN: CPT | Mod: 25

## 2023-04-06 PROCEDURE — 99223 1ST HOSP IP/OBS HIGH 75: CPT

## 2023-04-06 PROCEDURE — 99233 SBSQ HOSP IP/OBS HIGH 50: CPT

## 2023-04-06 RX ORDER — HALOPERIDOL DECANOATE 100 MG/ML
2 INJECTION INTRAMUSCULAR ONCE
Refills: 0 | Status: COMPLETED | OUTPATIENT
Start: 2023-04-06 | End: 2023-04-06

## 2023-04-06 RX ORDER — ATENOLOL 25 MG/1
25 TABLET ORAL DAILY
Refills: 0 | Status: DISCONTINUED | OUTPATIENT
Start: 2023-04-06 | End: 2023-04-08

## 2023-04-06 RX ORDER — HALOPERIDOL DECANOATE 100 MG/ML
1 INJECTION INTRAMUSCULAR ONCE
Refills: 0 | Status: COMPLETED | OUTPATIENT
Start: 2023-04-06 | End: 2023-04-06

## 2023-04-06 RX ADMIN — MIDODRINE HYDROCHLORIDE 10 MILLIGRAM(S): 2.5 TABLET ORAL at 05:08

## 2023-04-06 RX ADMIN — FINASTERIDE 5 MILLIGRAM(S): 5 TABLET, FILM COATED ORAL at 09:58

## 2023-04-06 RX ADMIN — CHLORHEXIDINE GLUCONATE 1 APPLICATION(S): 213 SOLUTION TOPICAL at 05:10

## 2023-04-06 RX ADMIN — ATENOLOL 25 MILLIGRAM(S): 25 TABLET ORAL at 09:58

## 2023-04-06 RX ADMIN — HALOPERIDOL DECANOATE 2 MILLIGRAM(S): 100 INJECTION INTRAMUSCULAR at 09:57

## 2023-04-06 RX ADMIN — ATORVASTATIN CALCIUM 10 MILLIGRAM(S): 80 TABLET, FILM COATED ORAL at 21:24

## 2023-04-06 RX ADMIN — Medication 150 MICROGRAM(S): at 05:08

## 2023-04-06 RX ADMIN — HALOPERIDOL DECANOATE 1 MILLIGRAM(S): 100 INJECTION INTRAMUSCULAR at 23:57

## 2023-04-06 RX ADMIN — PANTOPRAZOLE SODIUM 40 MILLIGRAM(S): 20 TABLET, DELAYED RELEASE ORAL at 09:59

## 2023-04-06 RX ADMIN — PANTOPRAZOLE SODIUM 40 MILLIGRAM(S): 20 TABLET, DELAYED RELEASE ORAL at 21:24

## 2023-04-06 NOTE — CONSULT NOTE ADULT - CONVERSATION DETAILS
The role of Palliative medicine was reviewed with the pt's son Hal as Bakari was unavailable. GOC discussed with Hal and his wife via telephone to discuss GOC, assist with planning and provide supportive counseling.  Palliative role explained.  Pt lacks capacity.  Prior to hospitalization, Pt. resided at home with 24/7 assistance. Family noted a significant decline the days prior to this hospitalization but report Pt. has overall declined since Dec of 2022 as he was independent with ADLS and driving until then.  Pt has 24/7 assistance at home, family state sons alternate and they just started to hire help at home.  Pt requires 1 assist with ADLs and uses a r/w.    Pts current medical condition discussed including overall fatigue, hypotension and weakness R/T ? blood loss.  Family shared what they have discussed with the medical team.  Son shared family had a family meeting last night to discuss pts current medical condition and discharge options.  We discussed IAN vs home with [facility] or hospice.  The philosophy of hospice reviewed in the event Pt. may need in the future.  We discussed the services hospice provides at home and that the focus changes to comfort with the support of hospice therefore Pt. can remain in his home which is most familiar to him.  Family desire to continue with current medical interventions and see how Pt progresses.    Advance directives reviewed.  HCP located on [employer] names clement Villegas as primary and Hal as alternate.  MOLST DNR/DNI/LMT/STH/NFT/Trial IV/Determine use abx/NO HD/Trial Vaso/No CVL.    Plan to be further determined, will see how Pt progresses.  PT eval discussed.  We discussed IAN vs home with [facility] or hospice.  Message left for clement Villegas, primary health care agent to discuss GOC and all levels of home care, awaiting call back.  Emotional support provided.  Our team to continue to follow.

## 2023-04-06 NOTE — PROGRESS NOTE ADULT - SUBJECTIVE AND OBJECTIVE BOX
HPI:  88 yo male, PMHx AFib on Xarelto (last dose 4/2 PM), HFrEF (TTE 2/13/23 EF 35-40%, dHF, mod MR/AR, severe TR, severe pHTN normal RV), CAD/PCI, prior AMI, HTN, renal cell carcinoma (observation), CKD, who presented from home to the ED for evaluation of hypotension and lethargy. Patient's children at bedside report that he has been hypotensive intermittently for the past 5 days, They stopped his Atenolol, however his condition significantly worsened over the past 2-3 days, with increasing lethargy. He spent the entirety of the day prior to arrival sleeping. They have also noted significant progressive SOUSA, only able to walk a few feet prior to becoming markedly dyspneic with associated LE edema. Denies fever or chills, abdominal pain, melena, epistaxis (last 08/2022), vomiting, chest pain. Family reports patient suffered a fall a few days ago, tripped and fell backwards, no headstrike, hit left arm on TV console but did not seek medical care.    Upon arrival to ED, patient was found to be hypotensive and markedly anemic with H/H 6.0/20, lactate 2.2-2.7. FOB+. Per family, patient's Hgb has been slowly drifting down, most recently 9/6 on 3/24/23. He has had intermittent melena since having COVID in 12/2022, and was recently hospitalized at  2/2023 to evaluate for large volume of melena noted at home, however H/H remained stable and patient had no further GI bleeding while admitted, therefore melena was attributed to epistaxis and patient was discharged. Patient's sons live with him and assist in his care, and have been checking his stools - they report brown stool, deny hematochezia or melena. Patient was resuscitated with 1L crystalloid and remained hypotensive, prompting ICU consult. On evaluation, patient was lethargic but arousable. Hypotensive SBP 70-80's, ordered for 2u PRBC.     4/4: Patient S/P 2U PRBC.  For EGD today  4/5: PAtient H & H stable.  he remains on pressors, cultures negative thus far  4/6: H & H Stable, more confused this morning, off pressors            PAST MEDICAL & SURGICAL HISTORY:  CAD (coronary artery disease)      S/P coronary artery stent placement      Afib          FAMILY HISTORY:      Social Hx:    Allergies    No Known Allergies    Intolerances            ICU Vital Signs Last 24 Hrs  T(C): 36.2 (06 Apr 2023 08:00), Max: 36.9 (05 Apr 2023 12:00)  T(F): 97.1 (06 Apr 2023 08:00), Max: 98.5 (05 Apr 2023 20:00)  HR: 119 (06 Apr 2023 10:00) (73 - 149)  BP: 123/86 (06 Apr 2023 10:00) (74/44 - 144/99)  BP(mean): 90 (06 Apr 2023 10:00) (55 - 112)  ABP: --  ABP(mean): --  RR: 25 (06 Apr 2023 10:00) (16 - 25)  SpO2: 99% (06 Apr 2023 10:00) (92% - 100%)    O2 Parameters below as of 06 Apr 2023 10:00  Patient On (Oxygen Delivery Method): nasal cannula  O2 Flow (L/min): 4              I&O's Summary    05 Apr 2023 07:01  -  06 Apr 2023 07:00  --------------------------------------------------------  IN: 147 mL / OUT: 975 mL / NET: -828 mL                              9.9    6.96  )-----------( 223      ( 06 Apr 2023 05:35 )             32.2       04-06    141  |  110<H>  |  66<H>  ----------------------------<  106<H>  4.0   |  25  |  1.56<H>    Ca    8.8      06 Apr 2023 05:35  Phos  3.3     04-06  Mg     2.1     04-06    MEDICATIONS  (STANDING):  atenolol  Tablet 25 milliGRAM(s) Oral daily  atorvastatin 10 milliGRAM(s) Oral at bedtime  chlorhexidine 4% Liquid 1 Application(s) Topical <User Schedule>  finasteride 5 milliGRAM(s) Oral daily  levothyroxine 150 MICROGram(s) Oral daily  pantoprazole  Injectable 40 milliGRAM(s) IV Push every 12 hours  phenylephrine    Infusion 0.2 MICROgram(s)/kG/Min (4.28 mL/Hr) IV Continuous <Continuous>    MEDICATIONS  (PRN):      DVT Prophylaxis: V    Advanced Directives:  Discussed with:    Visit Information:    ** Time is exclusive of billed procedures and/or teaching and/or routine family updates.

## 2023-04-06 NOTE — CDI QUERY NOTE - NSCDIOTHERTXTBX_GEN_ALL_CORE_HH
Clinical documentation indicates that this patient has atrial fibrillation.  Please further specify:    Chronic:  Paroxysmal:   Permanent:    Persistent:   Other:  Unknown:    SUPPORTING DOCUMENTATION AND/OR CLINICAL EVIDENCE:    [facility] 1-1-2020 documented chronic atrial fibrillation    Critical care documentation on 4/6/2023:  BRIEF HOSPITAL COURSE: 86 yo male, PMHx AFib on Xarelto (last dose 4/2 PM), HFrEF (TTE 2/13/23 EF 35-40%, dHF, mod MR/AR, severe TR, severe pHTN normal RV), CAD/PCI, prior AMI, HTN, renal cell carcinoma (observation), CKD        EKG:  < from: 12 Lead ECG (04.03.23 @ 17:49) >  Diagnosis Line Atrial flutter  Low voltage QRS  Anterolateral infarct (cited on or before 12-FEB-2023)  Abnormal ECG  When compared with ECG of 03-APR-2023 17:49,  Ectopic atrial rhythm has replaced Atrial flutter  Questionable change in QRS axis      Medications (Cardiovascular):  atenolol  Tablet 25 milliGRAM(s) Oral daily  phenylephrine    Infusion 0.2 MICROgram(s)/kG/Min IV Continuous <Continuous>

## 2023-04-06 NOTE — PROGRESS NOTE ADULT - ASSESSMENT
88 yo male, PMHx AFib on Xarelto (last dose 4/2 PM), HFrEF (TTE 2/13/23 EF 35-40%, dHF, mod MR/AR, severe TR, severe pHTN normal RV), CAD/PCI, prior AMI, HTN, renal cell carcinoma (observation), CKD admitted to ICU for    # Shock, Hypovolemic  # ABLA  # GIB  # YUE on CKD  # lactic Acidosis Type A    Dispo: DNR/DNI    Neuro:  - Avoid Neuro Deliriogenic / sedative medications    CV:  - actively titrating Edis to maintain MAP >65, midodrine increased to 10mg Q8H to help facilitate weaning off pressors  - Rate controlled AFIB, hold full AC in setting of GIB  - lipitor    Pulm:  - Supplemental oxygen as needed to maintain SpO2 > 92%,  - Incentive spirometry  - Blood found on vocals cords during EGD, may need bronch v ENT when stabilized                  GI:  - PPI BID  - Regular diet  - EGD: 7 mm duodenal bulb ulcer. 5 mm gastric/pyloric channel ulcer. Test and treat for H Pylori as per GI      Renal:  - Improving Renal Function Continue to monitor Bun/Cr and UOP  - Replacing electrolytes as needed with Goal K> 4, PO> 3, Mg> 2               - Strict I&O's  - Avoid Nephro toxic medication  - Renally dose meds    Heme:  - SCDs for DVT/PE ppx   - Trend H/H goal hgb > 8 in setting of GIB, currently 9.4    ID:  - Ceftriaxone  - Microbiology and Radiology reviewed   - trend CBC with diff, CMP  and fever curve    Endo:  - ISS for aggressive glycemic control to limit FS glucose to < 180mg/dl.  - Keep Euthyroid    Critical Care Time (EXCLUSIVE of any non bundled procedures) :  40 minutes were spent assessing the patient's presenting problems of acute illness that pose a high probability of life threatening  deterioration or end organ damage / dysfunction.  Medical desicion making includes initiation / continuation of plan or care review data/ labwork/ radiographic study, direct patient care bedside ,  discussions with  consultants regarding care,  evaluation and interpretation of vital signs, any necessary ventilator management,   NIV or BIPAP changes  or initiation,    discussions with multidisipliary team,  am or pm rounds, discussions of goals of care with patient and family all non-inclusive of procedures.

## 2023-04-06 NOTE — CDI QUERY NOTE - NSCDIOTHERTXTBX2_GEN_ALL_CORE_FT
Clinical documentation indicates that this patient has CHF.  Please include more specific documentation of the acuity and, type  of Heart Failure in your Progress Note and/or Discharge Summary.    -Chronic ___ CHF  -Biventricular heart Failure (include the type-systolic/diastolic also)  -Other (please specify)  -Not clinically significant      SUPPORTING DOCUMENTATION AND/OR CLINICAL EVIDENCE:    Critical care documentation on 4/6/2023:  BRIEF HOSPITAL COURSE: 88 yo male, PMHx AFib on Xarelto (last dose 4/2 PM), HFrEF (TTE 2/13/23 EF 35-40%, dHF, mod MR/AR, severe TR, severe pHTN normal RV), CAD/PCI, prior AMI, HTN, renal cell carcinoma (observation), CKD    ECHO RESULTS:  < from: TTE Echo Complete w/o Contrast w/ Doppler (02.13.23 @ 15:41) >   Estimated left ventricular ejection fraction is 35-40 %.    BNP:  Pro-Brain Natriuretic Peptide: 6160 pg/mL (04.03.23 @ 18:07)     MEDICATIONS:  atenolol  Tablet 25 milliGRAM(s) Oral daily  phenylephrine    Infusion 0.2 MICROgram(s)/kG/Min IV Continuous <Continuous>

## 2023-04-06 NOTE — PROGRESS NOTE ADULT - ASSESSMENT
A/P: 69 male presenting with lethargy and anemia from a likely GIB    plan:  ICU  PO Diet  H & H stable  No RP bleed on CT  DU and Gastric channel ulcer.  non-bleeding  BID PPI  Haldol x 1  Venodynes  D/W GI    GOC: DNR DNI.  Palliative to see the patient A/P: 69 male presenting with lethargy and anemia from a likely GIB    plan:  ICU  PO Diet  H & H stable  No RP bleed on CT  DU and Gastric channel ulcer.  non-bleeding  BID PPI  Haldol x 1  Venodynes  D/W GI  PAtient has HFrEF not in exacerbation  Patient has chronic AFIB/Flutter    GOC: DNR DNI.  Palliative to see the patient

## 2023-04-06 NOTE — CONSULT NOTE ADULT - ASSESSMENT
88 yo male, PMHx AFib on Xarelto (last dose 4/2 PM), HFrEF (TTE 2/13/23 EF 35-40%, dHF, mod MR/AR, severe TR, severe pHTN normal RV), CAD/PCI, prior AMI, HTN, renal cell carcinoma (observation), CKD, who presented from home to the ED for evaluation of hypotension and lethargy as well as progressive SOUSA, only able to walk a few feet with associated LE edema.  Upon arrival to ED, patient was found to be hypotensive and markedly anemic with H/H 6.0/20, lactate 2.2-2.7. FOB+. Per family, patient's Hgb has been slowly drifting down, most recently 9/6 on 3/24/23. He has had intermittent melena since having COVID in 12/2022, and was recently hospitalized at  2/2023 to evaluate for large volume of melena noted at home, however H/H remained stable and patient had no further GI bleeding while admitted, therefore melena was attributed to epistaxis and patient was discharged. hypotensive, prompting ICU consult. Palliative medicine. Consult Palliative Medicine to further establish GOC.  4/6/23 Seen and examined at bedside with no family present. Alert and oriented to self only. Reoriented to location. Denies complaints of pain or dyspnea.     Assessment and Plan:    1) Shock  -R/T hypovolemia  -Acute Blood loss  -GI eval noted  -S/P EGD duod bulb ulcer and ulcer at gastric/pyloric channel  -S/P PRBC  -Improved  -Hgb>9  -No current bleeding  -Cont PPI    2) YUE on CKD  -Gentle hydration  -Monitor closely    3) Debility  -Confusion  -Anemia  -S/P Fall  -PT eval  -Poor PO intake  -Mod protein itz malnutrition  -Nutrition eval    4) Renal Cell Cancer  -H/O  -No current tx    5) Advanced Directives  -Pt without capacity  -Son and daughter surrogate  -MOLST DNR/DNI/LMT/STH/NFT/TrialIV/Determineuseabx/NO HD/TrialVaso/No CVL  -Will discuss GOC with son        88 yo male, PMHx AFib on Xarelto (last dose 4/2 PM), HFrEF (TTE 2/13/23 EF 35-40%, dHF, mod MR/AR, severe TR, severe pHTN normal RV), CAD/PCI, prior AMI, HTN, renal cell carcinoma (observation), CKD, who presented from home to the ED for evaluation of hypotension and lethargy as well as progressive SOUSA, only able to walk a few feet with associated LE edema.  Upon arrival to ED, patient was found to be hypotensive and markedly anemic with H/H 6.0/20, lactate 2.2-2.7. FOB+. Per family, patient's Hgb has been slowly drifting down, most recently 9/6 on 3/24/23. He has had intermittent melena since having COVID in 12/2022, and was recently hospitalized at  2/2023 to evaluate for large volume of melena noted at home, however H/H remained stable and patient had no further GI bleeding while admitted, therefore melena was attributed to epistaxis and patient was discharged. hypotensive, prompting ICU consult. Palliative medicine. Consult Palliative Medicine to further establish GOC.  4/6/23 Seen and examined at bedside with no family present. Alert and oriented to self only. Reoriented to location. Denies complaints of pain or dyspnea.     Assessment and Plan:    1) Shock  -R/T hypovolemia  -Acute Blood loss  -GI eval noted  -S/P EGD duod bulb ulcer and ulcer at gastric/pyloric channel ( non bleeding as per family)  -S/P PRBC  -Improved  -Hgb>9  -No current bleeding  -Cont PPI    2) YUE on CKD  -Gentle hydration  -Monitor closely    3) Debility  -Confusion  -Anemia  -S/P Fall  -PT eval  -Poor PO intake  -Mod protein itz malnutrition  -Nutrition eval    4) Renal Cell Cancer  -H/O  -No current tx    5) Advanced Directives  -Pt without capacity  -Son and daughter surrogate  -MOLST DNR/DNI/LMT/STH/NFT/TrialIV/Determineuseabx/NO HD/TrialVaso/No CVL  -Will discuss GOC with son

## 2023-04-06 NOTE — PROGRESS NOTE ADULT - SUBJECTIVE AND OBJECTIVE BOX
Patient is a 87y old  Male who presents with a chief complaint of melena (04 Apr 2023 12:02)      BRIEF HOSPITAL COURSE: 86 yo male, PMHx AFib on Xarelto (last dose 4/2 PM), HFrEF (TTE 2/13/23 EF 35-40%, dHF, mod MR/AR, severe TR, severe pHTN normal RV), CAD/PCI, prior AMI, HTN, renal cell carcinoma (observation), CKD, who presented from home to the ED for evaluation of hypotension and lethargy. Patient's children at bedside report that he has been hypotensive intermittently for the past 5 days, They stopped his Atenolol, however his condition significantly worsened over the past 2-3 days, with increasing lethargy. He spent the entirety of the day prior to arrival sleeping. They have also noted significant progressive SOUSA, only able to walk a few feet prior to becoming markedly dyspneic with associated LE edema. Denies fever or chills, abdominal pain, melena, epistaxis (last 08/2022), vomiting, chest pain. Family reports patient suffered a fall a few days ago, tripped and fell backwards, no headstrike, hit left arm on TV console but did not seek medical care.    Upon arrival to ED, patient was found to be hypotensive and markedly anemic with H/H 6.0/20, lactate 2.2-2.7. FOB+. Per family, patient's Hgb has been slowly drifting down, most recently 9/6 on 3/24/23. He has had intermittent melena since having COVID in 12/2022, and was recently hospitalized at  2/2023 to evaluate for large volume of melena noted at home, however H/H remained stable and patient had no further GI bleeding while admitted, therefore melena was attributed to epistaxis and patient was discharged. Patient's sons live with him and assist in his care, and have been checking his stools - they report brown stool, deny hematochezia or melena. Patient was resuscitated with 1L crystalloid and remained hypotensive, prompting ICU consult. On evaluation, patient was lethargic but arousable. Hypotensive SBP 70-80's, ordered for 2u PRBC.    Events last 24 hours: Edis gtt @ .1, Hgb stable    PAST MEDICAL & SURGICAL HISTORY:  CAD (coronary artery disease)      S/P coronary artery stent placement      Afib      Medications:  cefTRIAXone Injectable. 1000 milliGRAM(s) IV Push every 24 hours    midodrine 10 milliGRAM(s) Oral every 8 hours  phenylephrine    Infusion 0.2 MICROgram(s)/kG/Min IV Continuous <Continuous>            pantoprazole  Injectable 40 milliGRAM(s) IV Push every 12 hours      atorvastatin 10 milliGRAM(s) Oral at bedtime  finasteride 5 milliGRAM(s) Oral daily  levothyroxine 150 MICROGram(s) Oral daily        chlorhexidine 4% Liquid 1 Application(s) Topical <User Schedule>            ICU Vital Signs Last 24 Hrs  T(C): 36.9 (05 Apr 2023 20:00), Max: 36.9 (05 Apr 2023 12:00)  T(F): 98.5 (05 Apr 2023 20:00), Max: 98.5 (05 Apr 2023 20:00)  HR: 88 (06 Apr 2023 00:00) (73 - 147)  BP: 110/65 (06 Apr 2023 00:00) (74/44 - 118/60)  BP(mean): 78 (06 Apr 2023 00:00) (55 - 96)  ABP: --  ABP(mean): --  RR: 18 (06 Apr 2023 00:00) (13 - 22)  SpO2: 96% (06 Apr 2023 00:00) (68% - 100%)    O2 Parameters below as of 06 Apr 2023 00:00  Patient On (Oxygen Delivery Method): room air                I&O's Detail    04 Apr 2023 07:01  -  05 Apr 2023 07:00  --------------------------------------------------------  IN:    Phenylephrine: 97 mL  Total IN: 97 mL    OUT:    Voided (mL): 900 mL  Total OUT: 900 mL    Total NET: -803 mL      05 Apr 2023 07:01  -  06 Apr 2023 02:38  --------------------------------------------------------  IN:    Phenylephrine: 121 mL  Total IN: 121 mL    OUT:    Voided (mL): 525 mL  Total OUT: 525 mL    Total NET: -404 mL          LABS:                        9.4    6.90  )-----------( 250      ( 05 Apr 2023 05:24 )             30.0     04-05    138  |  109<H>  |  74<H>  ----------------------------<  100<H>  3.9   |  24  |  1.52<H>    Ca    8.9      05 Apr 2023 05:24  Phos  3.4     04-05  Mg     2.2     04-05    TPro  5.8<L>  /  Alb  2.5<L>  /  TBili  0.6  /  DBili  x   /  AST  18  /  ALT  15  /  AlkPhos  61  04-04          CAPILLARY BLOOD GLUCOSE        PT/INR - ( 04 Apr 2023 05:30 )   PT: 18.7 sec;   INR: 1.60 ratio             CULTURES:  Culture Results:   No growth to date. (04-03 @ 19:27)  Culture Results:   No growth to date. (04-03 @ 18:07)      Physical Examination:    General: Well appearing, lying in bed in NAD.      HEENT: Pupils equal, reactive to light. Symmetric. No scleral icterus or injection.    PULM: Clear to auscultation B/L. No wheezes, rales, or rhonchi apprecaited. No significant sputum production or increased respiratory effort.    NECK: Supple, no lymphadenopathy, trachea midline.    CVS: Regular rate and rhythm, no murmurs appreciated, +s1/s2.    ABD: Soft, nondistended, nontender, normoactive bowel sounds.    EXT: 2+ pitting edema, nontender.    SKIN: Warm and well perfused, no rashes noted.    NEURO: Alert, oriented, interactive, nonfocal.    RADIOLOGY: < from: CT Abdomen and Pelvis No Cont (04.03.23 @ 23:40) >    ACC: [id number] EXAM:  CT ABDOMEN AND PELVIS   ORDERED BY: TU CAST     ACC: [id number] EXAM:  CT CHEST   ORDERED BY: TU CAST     PROCEDURE DATE:  04/03/2023          INTERPRETATION:  CLINICAL INFORMATION: Hypotension. Lethargy. Dyspnea on   exertion.    COMPARISON: CT abdomen pelvis 4/6/2015 and MRI abdomen 8/5/2021    CONTRAST/COMPLICATIONS:  IV Contrast: NONE  Oral Contrast: NONE  Complications: None reported at time of study completion    PROCEDURE:  CT of the Chest, Abdomen and Pelvis was performed.  Sagittal and coronal reformats were performed.    FINDINGS:  CHEST:  LUNGS AND LARGE AIRWAYS: Patent central airways. Subsegmental lingular   atelectasis. Bilateral lower lobe subsegmental compressive atelectasis   adjacent to pleural effusions. Nonspecific patchy bilateral groundglass   opacities and mild interlobular septal thickening.  PLEURA: Small bilateral simple pleural effusions.  VESSELS: Atherosclerotic changes of the aorta and coronary arteries.  HEART: Cardiomegaly. No pericardial effusion.  MEDIASTINUM AND KAIT: No lymphadenopathy.  CHEST WALL AND LOWER NECK: Within normal limits.    ABDOMEN AND PELVIS:  LIVER: Within normal limits.  BILE DUCTS: Redemonstration of pneumobilia. Stable bilateral internal and   extra hepatic bile duct dilatation.  GALLBLADDER: Cholecystectomy.  SPLEEN: Within normal limits.  PANCREAS: Within normal limits.  ADRENALS: Within normal limits.  KIDNEYS/URETERS: Interval growth of a left lower pole solid renal mass   measuring 5.6 x 4.8 cm, (previously 4.8 x 3.8 cm on MRI 8/5/2021).   Additional bilateral simple and hyperdense cysts are again noted.   Evaluation is limited by lack of IV contrast. Oral renal stones or   hydronephrosis.    BLADDER: Within normal limits.  REPRODUCTIVE ORGANS: Prostate within normal limits.    BOWEL: No bowel obstruction. Appendix is not visualized. No evidence of   inflammation in the pericecal region.  PERITONEUM: No ascites.  VESSELS: Atherosclerotic changes.  RETROPERITONEUM/LYMPH NODES: No lymphadenopathy.  ABDOMINAL WALL: Small fat-containing left inguinal hernia..  BONES: Degenerative changes.    IMPRESSION:  Cardiomegaly, bilateral pleural effusions and pulmonary findings   suggesting mild pulmonary edema.    No acute abdominal pathology.    Interval growth of a left lower pole solid renal mass previously   characterized as renal cell carcinoma.    --- End of Report ---            JULIANNE DIAZ MD; Attending Radiologist  This document has been electronically signed.Apr 4 2023 12:49AM    < end of copied text >

## 2023-04-06 NOTE — CONSULT NOTE ADULT - SUBJECTIVE AND OBJECTIVE BOX
88 yo male, PMHx AFib on Xarelto (last dose 4/2 PM), HFrEF (TTE 2/13/23 EF 35-40%, dHF, mod MR/AR, severe TR, severe pHTN normal RV), CAD/PCI, prior AMI, HTN, renal cell carcinoma (observation), CKD, who presented from home to the ED for evaluation of hypotension and lethargy as well as progressive SOUSA, only able to walk a few feet with associated LE edema.  Upon arrival to ED, patient was found to be hypotensive and markedly anemic with H/H 6.0/20, lactate 2.2-2.7. FOB+. Per family, patient's Hgb has been slowly drifting down, most recently 9/6 on 3/24/23. He has had intermittent melena since having COVID in 12/2022, and was recently hospitalized at  2/2023 to evaluate for large volume of melena noted at home, however H/H remained stable and patient had no further GI bleeding while admitted, therefore melena was attributed to epistaxis and patient was discharged. hypotensive, prompting ICU consult. Palliative medicine. Consult Palliative Medicine to further establish GOC.  4/6/23 Seen and examined at bedside with no family present. Alert and oriented to self only. Reoriented to location. Denies complaints of pain or dyspnea.    PAIN: ( )Yes   (X )No    DYSPNEA: ( ) Yes  (X ) No    PAST MEDICAL & SURGICAL HISTORY:  CAD (coronary artery disease)  S/P coronary artery stent placement  Afib    SOCIAL HX:  Lives with son  Hx opiate tolerance ( )YES  ( )NO    Baseline ADLs  (Prior to Admission)  (X ) Independent   ( )Dependent    FAMILY HISTORY:  Unable to obtain due to AMS    Review of Systems:  Unable to obtain/Limited due to: AMS      PHYSICAL EXAM:    Vital Signs Last 24 Hrs  T(C): 36.7 (06 Apr 2023 04:00), Max: 36.9 (05 Apr 2023 12:00)  T(F): 98.1 (06 Apr 2023 04:00), Max: 98.5 (05 Apr 2023 20:00)  HR: 129 (06 Apr 2023 07:00) (73 - 135)  BP: 144/99 (06 Apr 2023 07:00) (74/44 - 144/99)  BP(mean): 110 (06 Apr 2023 07:00) (55 - 110)  RR: 19 (06 Apr 2023 07:00) (14 - 22)  SpO2: 98% (06 Apr 2023 07:00) (92% - 100%)  Parameters below as of 06 Apr 2023 07:00  Patient On (Oxygen Delivery Method): room air  PPSV2: 40 %    General: Elderly male in bed in NAD  Mental Status: Alert and oriented  to name  HEENT: Oral mucosa dry/nasal O2  Lungs: clear to auscultation wilber  Cardiac: S1S2+  GI: abd soft NT ND + BS  : voids  Ext: VAZQUEZ on bed  Neuro: AMS      LABS:                        9.9    6.96  )-----------( 223      ( 06 Apr 2023 05:35 )             32.2     04-06    141  |  110<H>  |  66<H>  ----------------------------<  106<H>  4.0   |  25  |  1.56<H>    Ca    8.8      06 Apr 2023 05:35  Phos  3.3     04-06  Mg     2.1     04-06    Albumin: Albumin, Serum: 2.5 g/dL (04-04 @ 05:30)  Allergies    No Known Allergies    Intolerances      MEDICATIONS  (STANDING):  atorvastatin 10 milliGRAM(s) Oral at bedtime  cefTRIAXone Injectable. 1000 milliGRAM(s) IV Push every 24 hours  chlorhexidine 4% Liquid 1 Application(s) Topical <User Schedule>  finasteride 5 milliGRAM(s) Oral daily  levothyroxine 150 MICROGram(s) Oral daily  midodrine 10 milliGRAM(s) Oral every 8 hours  pantoprazole  Injectable 40 milliGRAM(s) IV Push every 12 hours  phenylephrine    Infusion 0.2 MICROgram(s)/kG/Min (4.28 mL/Hr) IV Continuous <Continuous>    MEDICATIONS  (PRN):      RADIOLOGY/ADDITIONAL STUDIES:    < from: CT Abdomen and Pelvis No Cont (04.03.23 @ 23:40) >    ACC: [id number] EXAM:  CT ABDOMEN AND PELVIS   ORDERED BY: TU CAST     ACC: [id number] EXAM:  CT CHEST   ORDERED BY: TU CAST     PROCEDURE DATE:  04/03/2023          INTERPRETATION:  CLINICAL INFORMATION: Hypotension. Lethargy. Dyspnea on   exertion.    COMPARISON: CT abdomen pelvis 4/6/2015 and MRI abdomen 8/5/2021    CONTRAST/COMPLICATIONS:  IV Contrast: NONE  Oral Contrast: NONE  Complications: None reported at time of study completion    PROCEDURE:  CT of the Chest, Abdomen and Pelvis was performed.  Sagittal and coronal reformats were performed.    FINDINGS:  CHEST:  LUNGS AND LARGE AIRWAYS: Patent central airways. Subsegmental lingular   atelectasis. Bilateral lower lobe subsegmental compressive atelectasis   adjacent to pleural effusions. Nonspecific patchy bilateral groundglass   opacities and mild interlobular septal thickening.  PLEURA: Small bilateral simple pleural effusions.  VESSELS: Atherosclerotic changes of the aorta and coronary arteries.  HEART: Cardiomegaly. No pericardial effusion.  MEDIASTINUM AND KAIT: No lymphadenopathy.  CHEST WALL AND LOWER NECK: Within normal limits.    ABDOMEN AND PELVIS:  LIVER: Within normal limits.  BILE DUCTS: Redemonstration of pneumobilia. Stable bilateral internal and   extra hepatic bile duct dilatation.  GALLBLADDER: Cholecystectomy.  SPLEEN: Within normal limits.  PANCREAS: Within normal limits.  ADRENALS: Within normal limits.  KIDNEYS/URETERS: Interval growth of a left lower pole solid renal mass   measuring 5.6 x 4.8 cm, (previously 4.8 x 3.8 cm on MRI 8/5/2021).   Additional bilateral simple and hyperdense cysts are again noted.   Evaluation is limited by lack of IV contrast. Oral renal stones or   hydronephrosis.    BLADDER: Within normal limits.  REPRODUCTIVE ORGANS: Prostate within normal limits.    BOWEL: No bowel obstruction. Appendix is not visualized. No evidence of   inflammation in the pericecal region.  PERITONEUM: No ascites.  VESSELS: Atherosclerotic changes.  RETROPERITONEUM/LYMPH NODES: No lymphadenopathy.  ABDOMINAL WALL: Small fat-containing left inguinal hernia..  BONES: Degenerative changes.    IMPRESSION:  Cardiomegaly, bilateral pleural effusions and pulmonary findings   suggesting mild pulmonary edema.    No acute abdominal pathology.    Interval growth of a left lower pole solid renal mass previously   characterized as renal cell carcinoma.  < from: Xray Chest 1 View- PORTABLE-Urgent (04.03.23 @ 18:59) >    ACC: [id number] EXAM:  XR CHEST PORTABLE URGENT 1V   ORDERED BY: JANIA HAWKINS     PROCEDURE DATE:  04/03/2023          INTERPRETATION:  INDICATION: Chest pain    Portable chest 6:54 PM    COMPARISON: 2/12/2023    FINDINGS:  Heart/Vascular: The mediastinum, hilum and aorta are within normal limits   for projection. Atherosclerotic change. Cardiomegaly.  Pulmonary: Midline trachea. There is a persistent moderate left effusion   with underlying consolidation. Hazy opacity right base may represent   small effusion or infiltrate. Mild pulmonary venous congestion.    Bones: There is no fracture.  Lines and catheter: None    Impression:    Findings again compatible with CHF with a small right and moderate left   effusion/compressive atelectasis.

## 2023-04-06 NOTE — CDI QUERY NOTE - NSCDINOTECODERS_GEN_A_CORE
CDI Specialist
I have personally performed a face to face diagnostic evaluation on this patient. I have reviewed the ACP note and agree with the history, exam and plan of care, except as noted.

## 2023-04-07 LAB
ANION GAP SERPL CALC-SCNC: 8 MMOL/L — SIGNIFICANT CHANGE UP (ref 5–17)
BUN SERPL-MCNC: 61 MG/DL — HIGH (ref 7–23)
CALCIUM SERPL-MCNC: 8.8 MG/DL — SIGNIFICANT CHANGE UP (ref 8.5–10.1)
CHLORIDE SERPL-SCNC: 110 MMOL/L — HIGH (ref 96–108)
CO2 SERPL-SCNC: 22 MMOL/L — SIGNIFICANT CHANGE UP (ref 22–31)
CREAT SERPL-MCNC: 1.61 MG/DL — HIGH (ref 0.5–1.3)
EGFR: 41 ML/MIN/1.73M2 — LOW
GLUCOSE SERPL-MCNC: 116 MG/DL — HIGH (ref 70–99)
HCT VFR BLD CALC: 31 % — LOW (ref 39–50)
HGB BLD-MCNC: 9.8 G/DL — LOW (ref 13–17)
MAGNESIUM SERPL-MCNC: 2.2 MG/DL — SIGNIFICANT CHANGE UP (ref 1.6–2.6)
MCHC RBC-ENTMCNC: 30.2 PG — SIGNIFICANT CHANGE UP (ref 27–34)
MCHC RBC-ENTMCNC: 31.6 GM/DL — LOW (ref 32–36)
MCV RBC AUTO: 95.4 FL — SIGNIFICANT CHANGE UP (ref 80–100)
PHOSPHATE SERPL-MCNC: 4 MG/DL — SIGNIFICANT CHANGE UP (ref 2.5–4.5)
PLATELET # BLD AUTO: 248 K/UL — SIGNIFICANT CHANGE UP (ref 150–400)
POTASSIUM SERPL-MCNC: 4.5 MMOL/L — SIGNIFICANT CHANGE UP (ref 3.5–5.3)
POTASSIUM SERPL-SCNC: 4.5 MMOL/L — SIGNIFICANT CHANGE UP (ref 3.5–5.3)
RBC # BLD: 3.25 M/UL — LOW (ref 4.2–5.8)
RBC # FLD: 18 % — HIGH (ref 10.3–14.5)
SODIUM SERPL-SCNC: 140 MMOL/L — SIGNIFICANT CHANGE UP (ref 135–145)
WBC # BLD: 7.92 K/UL — SIGNIFICANT CHANGE UP (ref 3.8–10.5)
WBC # FLD AUTO: 7.92 K/UL — SIGNIFICANT CHANGE UP (ref 3.8–10.5)

## 2023-04-07 PROCEDURE — 99497 ADVNCD CARE PLAN 30 MIN: CPT

## 2023-04-07 PROCEDURE — 99233 SBSQ HOSP IP/OBS HIGH 50: CPT

## 2023-04-07 RX ORDER — HYDROMORPHONE HYDROCHLORIDE 2 MG/ML
0.5 INJECTION INTRAMUSCULAR; INTRAVENOUS; SUBCUTANEOUS
Qty: 20 | Refills: 0
Start: 2023-04-07

## 2023-04-07 RX ORDER — ALPRAZOLAM 0.25 MG
0.25 TABLET ORAL ONCE
Refills: 0 | Status: DISCONTINUED | OUTPATIENT
Start: 2023-04-07 | End: 2023-04-07

## 2023-04-07 RX ORDER — ACETAMINOPHEN 500 MG
1000 TABLET ORAL ONCE
Refills: 0 | Status: COMPLETED | OUTPATIENT
Start: 2023-04-07 | End: 2023-04-07

## 2023-04-07 RX ORDER — HALOPERIDOL DECANOATE 100 MG/ML
1 INJECTION INTRAMUSCULAR ONCE
Refills: 0 | Status: COMPLETED | OUTPATIENT
Start: 2023-04-07 | End: 2023-04-07

## 2023-04-07 RX ORDER — ACETAMINOPHEN 500 MG
650 TABLET ORAL EVERY 6 HOURS
Refills: 0 | Status: DISCONTINUED | OUTPATIENT
Start: 2023-04-07 | End: 2023-04-08

## 2023-04-07 RX ADMIN — ATORVASTATIN CALCIUM 10 MILLIGRAM(S): 80 TABLET, FILM COATED ORAL at 21:28

## 2023-04-07 RX ADMIN — PANTOPRAZOLE SODIUM 40 MILLIGRAM(S): 20 TABLET, DELAYED RELEASE ORAL at 10:55

## 2023-04-07 RX ADMIN — FINASTERIDE 5 MILLIGRAM(S): 5 TABLET, FILM COATED ORAL at 10:55

## 2023-04-07 RX ADMIN — Medication 150 MICROGRAM(S): at 04:55

## 2023-04-07 RX ADMIN — Medication 0.25 MILLIGRAM(S): at 04:55

## 2023-04-07 RX ADMIN — CHLORHEXIDINE GLUCONATE 1 APPLICATION(S): 213 SOLUTION TOPICAL at 06:29

## 2023-04-07 RX ADMIN — Medication 400 MILLIGRAM(S): at 04:56

## 2023-04-07 RX ADMIN — HALOPERIDOL DECANOATE 1 MILLIGRAM(S): 100 INJECTION INTRAMUSCULAR at 02:36

## 2023-04-07 RX ADMIN — PANTOPRAZOLE SODIUM 40 MILLIGRAM(S): 20 TABLET, DELAYED RELEASE ORAL at 21:28

## 2023-04-07 NOTE — PROGRESS NOTE ADULT - ASSESSMENT
A/P: 69 male presenting with lethargy and anemia from a likely GIB    plan:  ICU  PO Diet  H & H stable  No RP bleed on CT  DU and Gastric channel ulcer.  Non-bleeding  BID PPI  Venodynes  Patient has HFrEF not in exacerbation  Patient has chronic AFIB/Flutter    GOC: DNR DNI.  Palliative to see the patient.  Possible D/C home on hospice

## 2023-04-07 NOTE — PHYSICAL THERAPY INITIAL EVALUATION ADULT - ACTIVE RANGE OF MOTION EXAMINATION, REHAB EVAL
except R shld elev ~30/bilateral upper extremity Active ROM was WFL (within functional limits)/bilateral  lower extremity Active ROM was WFL (within functional limits)

## 2023-04-07 NOTE — PROGRESS NOTE ADULT - GASTROINTESTINAL
soft/nontender/nondistended

## 2023-04-07 NOTE — PHYSICAL THERAPY INITIAL EVALUATION ADULT - PERTINENT HX OF CURRENT PROBLEM, REHAB EVAL
presented from home to the ED for evaluation of hypotension and lethargy. Patient's children at bedside report that he has been hypotensive intermittently for the past 5 days, They stopped his Atenolol, however his condition significantly worsened over the past 2-3 days, with increasing lethargy. He spent the entirety of the day prior to arrival sleeping. They have also noted significant progressive SOUSA, only able to walk a few feet prior to becoming markedly dyspneic with associated LE edema. Upon arrival to ED, patient was found to be hypotensive and markedly anemic with H/H 6.0/20, lactate 2.2-2.7. FOB+. pt transfused. EGD done 4/4. H/H stable. today 9.8/31.0

## 2023-04-07 NOTE — PHYSICAL THERAPY INITIAL EVALUATION ADULT - ADDITIONAL COMMENTS
pt offering different info regarding PLOF, home-set-up but seems to indicate someone w/ him 24/7, 3 level house, pt's bedrm on 1st floor, initially indicating amb w/ RW prn then indicating amb w/ RW and 2PA ("sons") since a recent fall. has shower chair, lift chair, ?HHA

## 2023-04-07 NOTE — PROGRESS NOTE ADULT - PROVIDER SPECIALTY LIST ADULT
Critical Care
Gastroenterology
Critical Care

## 2023-04-07 NOTE — GOALS OF CARE CONVERSATION - ADVANCED CARE PLANNING - CONVERSATION DETAILS
Hospice was explained as well  as an end of life care philosophy.  When a disease cannot be cured, or family/patient decide the treatment burdens out weigh the risk and one choses to change focus of treatment from cure to quality/comfort. Patient HCP Bakari and alternate son Hal would like a referral placed [facility] for [facility]. they would like patient to be d/jamil home tomorrow and know that start of care date may not be right away but they are aware.  Sons both are firefighters in the community and patient daughter in law is a ED RN so they feel they have enough help to care for him.      Spoke with Riddhi CHÁVEZ to initiate referral to home hospice with [facility]
HPI:  86 yo male, PMHx AFib on Xarelto (last dose 4/2 PM), HFrEF (TTE 2/13/23 EF 35-40%, dHF, mod MR/AR, severe TR, severe pHTN normal RV), CAD/PCI, prior AMI, HTN, renal cell carcinoma (observation), CKD, who presented from home to the ED for evaluation of hypotension and lethargy.    (04 Apr 2023 03:14)      PERTINENT PMH REVIEWED:  [ X ] YES [ ] NO           Primary Contact:   wicho Villegas, health care agent #[phone]  / wicho Hong, alternate agent, #[phone]    HCP [ X ] Surrogate [   ] Guardian [   ]    Mental Status: Pt. lacks capacity  Concerns of Depression [  ] -not identified  Anxiety [   ] -not identified  Baseline ADLs (prior to admission):  Independent [ ] moderately [ ] fully   Dependent   [ X ] moderately [ ]fully    Family Meeting attendees: GOC 4/6    Anticipated Grief: Patient[  ] Family [ X ]    Caregiver [address] Assessed: Yes [ X ] No [  ]    Oriental orthodox: Congregation.    Spiritual Concerns: Not identified,  available for support as needed.    Goals of Care: To be further determined.    Previous Services: [facility] in the past.     ADVANCE DIRECTIVES:    -Pt without capacity  -HCP located on [employer] names son Bakari as primary and Hal as alternate  -MOLST DNR/DNI/LMT/STH/NFT/TrialIV/Determineuseabx/NO HD/TrialVaso/No CVL    Anticipated D/C Plan: To be further determined.                     Summary:  Palliative NP, Alyssa Burris and this SW spoke with wicho Hong and his wife via telephone to discuss GOC, assist with planning and provide supportive counseling.  Palliative role explained.  Emotional support provided.  Pt lacks capacity.  Prior to hospitalization, Pt. resided at home with 24/7 assistance.  Family noted a significant decline the days prior to this hospitalization but report Pt. has overall declined since Dec of 2022 as he was independent with ADLS and driving until then.  Pt has 24/7 assistance at home, family state sons alternate and they just started to hire help at home.  Pt requires 1 assist with ADLs and uses a r/w.  Families feelings explored.  Support provided.    We discussed Pts current medical condition.  Family shared what they have discussed with the medical team.  Wicho flor family had a family meeting last night to discuss pts current medical condition and discharge options.  We discussed IAN vs home with [facility] or hospice.  The philosophy of hospice reviewed in the event Pt. may need in the future.  We discussed the services hospice provides at home and that the focus changes to comfort with the support of hospice therefore Pt. can remain in his home which is most familiar to him.  Family desire to continue with current medical interventions and see how Pt progresses.    Advance directives reviewed.  HCP located on [employer] names wicho Villegas as primary and Hal as alternate.  MOLST DNR/DNI/LMT/STH/NFT/Trial IV/Determine use abx/NO HD/Trial Vaso/No CVL.    Plan to be further determined, will see how Pt progresses.  PT eval discussed.  We discussed IAN vs home with [facility] or hospice.  Message left for wicho Villegas, primary health care agent to discuss GOC and all levels of home care, awaiting call back.  Emotional support provided.  Our team to continue to follow.

## 2023-04-07 NOTE — PROGRESS NOTE ADULT - NUTRITIONAL ASSESSMENT
This patient has been assessed with a concern for Malnutrition and has been determined to have a diagnosis/diagnoses of Severe protein-calorie malnutrition.    This patient is being managed with:   Diet NPO-  Except Medications  With Ice Chips/Sips of Water  Entered: Apr 4 2023  3:32AM  
This patient has been assessed with a concern for Malnutrition and has been determined to have a diagnosis/diagnoses of Severe protein-calorie malnutrition.    This patient is being managed with:   Diet Regular-  Entered: Apr 5 2023  7:41AM  

## 2023-04-07 NOTE — PROGRESS NOTE ADULT - SUBJECTIVE AND OBJECTIVE BOX
HPI:  86 yo male, PMHx AFib on Xarelto (last dose 4/2 PM), HFrEF (TTE 2/13/23 EF 35-40%, dHF, mod MR/AR, severe TR, severe pHTN normal RV), CAD/PCI, prior AMI, HTN, renal cell carcinoma (observation), CKD, who presented from home to the ED for evaluation of hypotension and lethargy. Patient's children at bedside report that he has been hypotensive intermittently for the past 5 days, They stopped his Atenolol, however his condition significantly worsened over the past 2-3 days, with increasing lethargy. He spent the entirety of the day prior to arrival sleeping. They have also noted significant progressive SOUSA, only able to walk a few feet prior to becoming markedly dyspneic with associated LE edema. Denies fever or chills, abdominal pain, melena, epistaxis (last 08/2022), vomiting, chest pain. Family reports patient suffered a fall a few days ago, tripped and fell backwards, no headstrike, hit left arm on TV console but did not seek medical care.    Upon arrival to ED, patient was found to be hypotensive and markedly anemic with H/H 6.0/20, lactate 2.2-2.7. FOB+. Per family, patient's Hgb has been slowly drifting down, most recently 9/6 on 3/24/23. He has had intermittent melena since having COVID in 12/2022, and was recently hospitalized at  2/2023 to evaluate for large volume of melena noted at home, however H/H remained stable and patient had no further GI bleeding while admitted, therefore melena was attributed to epistaxis and patient was discharged. Patient's sons live with him and assist in his care, and have been checking his stools - they report brown stool, deny hematochezia or melena. Patient was resuscitated with 1L crystalloid and remained hypotensive, prompting ICU consult. On evaluation, patient was lethargic but arousable. Hypotensive SBP 70-80's, ordered for 2u PRBC.     4/4: Patient S/P 2U PRBC.  For EGD today  4/5: PAtient H & H stable.  he remains on pressors, cultures negative thus far  4/6: H & H Stable, more confused this morning, off pressors  4/7: Periods of confusion, alert, tolerating PO             PAST MEDICAL & SURGICAL HISTORY:  CAD (coronary artery disease)      S/P coronary artery stent placement      Afib          FAMILY HISTORY:      Social Hx:    Allergies    No Known Allergies    Intolerances            ICU Vital Signs Last 24 Hrs  T(C): 36.7 (07 Apr 2023 08:00), Max: 36.7 (07 Apr 2023 08:00)  T(F): 98 (07 Apr 2023 08:00), Max: 98 (07 Apr 2023 08:00)  HR: 79 (07 Apr 2023 11:00) (61 - 114)  BP: 97/63 (07 Apr 2023 11:00) (74/59 - 124/73)  BP(mean): 75 (07 Apr 2023 11:00) (62 - 103)  ABP: --  ABP(mean): --  RR: 23 (07 Apr 2023 11:00) (12 - 30)  SpO2: 98% (07 Apr 2023 11:00) (90% - 100%)    O2 Parameters below as of 07 Apr 2023 08:00  Patient On (Oxygen Delivery Method): room air                I&O's Summary    06 Apr 2023 07:01  -  07 Apr 2023 07:00  --------------------------------------------------------  IN: 100 mL / OUT: 500 mL / NET: -400 mL                              9.8    7.92  )-----------( 248      ( 07 Apr 2023 05:31 )             31.0       04-07    140  |  110<H>  |  61<H>  ----------------------------<  116<H>  4.5   |  22  |  1.61<H>    Ca    8.8      07 Apr 2023 05:31  Phos  4.0     04-07  Mg     2.2     04-07                      MEDICATIONS  (STANDING):  atenolol  Tablet 25 milliGRAM(s) Oral daily  atorvastatin 10 milliGRAM(s) Oral at bedtime  chlorhexidine 4% Liquid 1 Application(s) Topical <User Schedule>  finasteride 5 milliGRAM(s) Oral daily  levothyroxine 150 MICROGram(s) Oral daily  pantoprazole  Injectable 40 milliGRAM(s) IV Push every 12 hours    MEDICATIONS  (PRN):  acetaminophen     Tablet .. 650 milliGRAM(s) Oral every 6 hours PRN Temp greater or equal to 38C (100.4F), Mild Pain (1 - 3)      DVT Prophylaxis: V    Advanced Directives:  Discussed with:    Visit Information:    ** Time is exclusive of billed procedures and/or teaching and/or routine family updates.

## 2023-04-07 NOTE — PROGRESS NOTE ADULT - CARDIOVASCULAR
regular rate and rhythm/S1 S2 present

## 2023-04-07 NOTE — PROGRESS NOTE ADULT - RESPIRATORY
no wheezes/no rales/no rhonchi/diminished breath sounds, L/diminished breath sounds, R

## 2023-04-08 ENCOUNTER — TRANSCRIPTION ENCOUNTER (OUTPATIENT)
Age: 88
End: 2023-04-08

## 2023-04-08 VITALS
SYSTOLIC BLOOD PRESSURE: 81 MMHG | OXYGEN SATURATION: 98 % | TEMPERATURE: 98 F | HEART RATE: 89 BPM | DIASTOLIC BLOOD PRESSURE: 62 MMHG

## 2023-04-08 LAB
ANION GAP SERPL CALC-SCNC: 4 MMOL/L — LOW (ref 5–17)
BUN SERPL-MCNC: 68 MG/DL — HIGH (ref 7–23)
CALCIUM SERPL-MCNC: 9 MG/DL — SIGNIFICANT CHANGE UP (ref 8.5–10.1)
CHLORIDE SERPL-SCNC: 110 MMOL/L — HIGH (ref 96–108)
CO2 SERPL-SCNC: 23 MMOL/L — SIGNIFICANT CHANGE UP (ref 22–31)
CREAT SERPL-MCNC: 1.85 MG/DL — HIGH (ref 0.5–1.3)
EGFR: 35 ML/MIN/1.73M2 — LOW
GLUCOSE SERPL-MCNC: 104 MG/DL — HIGH (ref 70–99)
HCT VFR BLD CALC: 32.3 % — LOW (ref 39–50)
HGB BLD-MCNC: 9.9 G/DL — LOW (ref 13–17)
MAGNESIUM SERPL-MCNC: 2.3 MG/DL — SIGNIFICANT CHANGE UP (ref 1.6–2.6)
MCHC RBC-ENTMCNC: 29.5 PG — SIGNIFICANT CHANGE UP (ref 27–34)
MCHC RBC-ENTMCNC: 30.7 GM/DL — LOW (ref 32–36)
MCV RBC AUTO: 96.1 FL — SIGNIFICANT CHANGE UP (ref 80–100)
PHOSPHATE SERPL-MCNC: 4 MG/DL — SIGNIFICANT CHANGE UP (ref 2.5–4.5)
PLATELET # BLD AUTO: 255 K/UL — SIGNIFICANT CHANGE UP (ref 150–400)
POTASSIUM SERPL-MCNC: 4.6 MMOL/L — SIGNIFICANT CHANGE UP (ref 3.5–5.3)
POTASSIUM SERPL-SCNC: 4.6 MMOL/L — SIGNIFICANT CHANGE UP (ref 3.5–5.3)
RBC # BLD: 3.36 M/UL — LOW (ref 4.2–5.8)
RBC # FLD: 17.4 % — HIGH (ref 10.3–14.5)
SODIUM SERPL-SCNC: 137 MMOL/L — SIGNIFICANT CHANGE UP (ref 135–145)
WBC # BLD: 6.6 K/UL — SIGNIFICANT CHANGE UP (ref 3.8–10.5)
WBC # FLD AUTO: 6.6 K/UL — SIGNIFICANT CHANGE UP (ref 3.8–10.5)

## 2023-04-08 PROCEDURE — 99239 HOSP IP/OBS DSCHRG MGMT >30: CPT

## 2023-04-08 RX ORDER — LEVOTHYROXINE SODIUM 125 MCG
150 TABLET ORAL DAILY
Refills: 0 | Status: DISCONTINUED | OUTPATIENT
Start: 2023-04-08 | End: 2023-04-08

## 2023-04-08 RX ORDER — ATORVASTATIN CALCIUM 80 MG/1
1 TABLET, FILM COATED ORAL
Qty: 0 | Refills: 0 | DISCHARGE
Start: 2023-04-08

## 2023-04-08 RX ORDER — RIVAROXABAN 15 MG-20MG
1 KIT ORAL
Qty: 0 | Refills: 0 | DISCHARGE

## 2023-04-08 RX ORDER — FUROSEMIDE 40 MG
1 TABLET ORAL
Refills: 0 | DISCHARGE

## 2023-04-08 RX ORDER — ATORVASTATIN CALCIUM 80 MG/1
10 TABLET, FILM COATED ORAL AT BEDTIME
Refills: 0 | Status: DISCONTINUED | OUTPATIENT
Start: 2023-04-08 | End: 2023-04-08

## 2023-04-08 RX ORDER — ATENOLOL 25 MG/1
1 TABLET ORAL
Refills: 0 | DISCHARGE

## 2023-04-08 RX ORDER — FINASTERIDE 5 MG/1
5 TABLET, FILM COATED ORAL DAILY
Refills: 0 | Status: DISCONTINUED | OUTPATIENT
Start: 2023-04-08 | End: 2023-04-08

## 2023-04-08 RX ORDER — FINASTERIDE 5 MG/1
1 TABLET, FILM COATED ORAL
Qty: 0 | Refills: 0 | DISCHARGE

## 2023-04-08 RX ORDER — SACUBITRIL AND VALSARTAN 24; 26 MG/1; MG/1
1 TABLET, FILM COATED ORAL
Refills: 0 | DISCHARGE

## 2023-04-08 RX ADMIN — Medication 150 MICROGRAM(S): at 05:04

## 2023-04-08 RX ADMIN — CHLORHEXIDINE GLUCONATE 1 APPLICATION(S): 213 SOLUTION TOPICAL at 06:12

## 2023-04-08 RX ADMIN — FINASTERIDE 5 MILLIGRAM(S): 5 TABLET, FILM COATED ORAL at 10:57

## 2023-04-08 RX ADMIN — PANTOPRAZOLE SODIUM 40 MILLIGRAM(S): 20 TABLET, DELAYED RELEASE ORAL at 10:57

## 2023-04-08 NOTE — DISCHARGE NOTE PROVIDER - HOSPITAL COURSE
HPI:  88 yo male, PMHx AFib on Xarelto (last dose 4/2 PM), HFrEF (TTE 2/13/23 EF 35-40%, dHF, mod MR/AR, severe TR, severe pHTN normal RV), CAD/PCI, prior AMI, HTN, renal cell carcinoma (observation), CKD, who presented from home to the ED for evaluation of hypotension and lethargy. Patient's children at bedside report that he has been hypotensive intermittently for the past 5 days, They stopped his Atenolol, however his condition significantly worsened over the past 2-3 days, with increasing lethargy. He spent the entirety of the day prior to arrival sleeping. They have also noted significant progressive SOUSA, only able to walk a few feet prior to becoming markedly dyspneic with associated LE edema. Denies fever or chills, abdominal pain, melena, epistaxis (last 08/2022), vomiting, chest pain. Family reports patient suffered a fall a few days ago, tripped and fell backwards, no headstrike, hit left arm on TV console but did not seek medical care.    Upon arrival to ED, patient was found to be hypotensive and markedly anemic with H/H 6.0/20, lactate 2.2-2.7. FOB+. Per family, patient's Hgb has been slowly drifting down, most recently 9/6 on 3/24/23. He has had intermittent melena since having COVID in 12/2022, and was recently hospitalized at  2/2023 to evaluate for large volume of melena noted at home, however H/H remained stable and patient had no further GI bleeding while admitted, therefore melena was attributed to epistaxis and patient was discharged. Patient's sons live with him and assist in his care, and have been checking his stools - they report brown stool, deny hematochezia or melena. Patient was resuscitated with 1L crystalloid and remained hypotensive, prompting ICU consult. On evaluation, patient was lethargic but arousable. Hypotensive SBP 70-80's, ordered for 2u PRBC.     4/4: Patient S/P 2U PRBC.  For EGD today  4/5: Patient's H & H stable.  He remains on pressors, cultures negative thus far  4/6: H & H Stable, more confused this morning, off pressors  4/7: Periods of confusion, alert, tolerating PO    Patient will be D/C home on Home Hospice.  he will continue his prior medications including Entresto, Lopressor, Lasix, Synthrois as they help with his known heart Failure.  He will not continue Asprin or Xalerto

## 2023-04-08 NOTE — DISCHARGE NOTE PROVIDER - DETAILS OF MALNUTRITION DIAGNOSIS/DIAGNOSES
This patient has been assessed with a concern for Malnutrition and was treated during this hospitalization for the following Nutrition diagnosis/diagnoses:     -  04/04/2023: Severe protein-calorie malnutrition

## 2023-04-08 NOTE — DISCHARGE NOTE NURSING/CASE MANAGEMENT/SOCIAL WORK - PATIENT PORTAL LINK FT
You can access the FollowMyHealth Patient Portal offered by [facility] by registering at the following website: [email or web address]. By joining [facility]’s FollowMyHealth portal, you will also be able to view your health information using other applications (apps) compatible with our system.

## 2023-04-08 NOTE — DISCHARGE NOTE NURSING/CASE MANAGEMENT/SOCIAL WORK - NSDCPEFALRISK_GEN_ALL_CORE
For information on Fall & Injury Prevention, visit: [email or web address] OR  [email or web address] OR  https://www.cdc.gov/steadi/patient.html

## 2023-04-09 LAB
CULTURE RESULTS: SIGNIFICANT CHANGE UP
CULTURE RESULTS: SIGNIFICANT CHANGE UP
SPECIMEN SOURCE: SIGNIFICANT CHANGE UP
SPECIMEN SOURCE: SIGNIFICANT CHANGE UP

## 2023-04-13 ENCOUNTER — RX RENEWAL (OUTPATIENT)
Age: 88
End: 2023-04-13

## 2023-04-13 DIAGNOSIS — D62 ACUTE POSTHEMORRHAGIC ANEMIA: ICD-10-CM

## 2023-04-13 DIAGNOSIS — Z51.5 ENCOUNTER FOR PALLIATIVE CARE: ICD-10-CM

## 2023-04-13 DIAGNOSIS — K25.4 CHRONIC OR UNSPECIFIED GASTRIC ULCER WITH HEMORRHAGE: ICD-10-CM

## 2023-04-13 DIAGNOSIS — I13.0 HYPERTENSIVE HEART AND CHRONIC KIDNEY DISEASE WITH HEART FAILURE AND STAGE 1 THROUGH STAGE 4 CHRONIC KIDNEY DISEASE, OR UNSPECIFIED CHRONIC KIDNEY DISEASE: ICD-10-CM

## 2023-04-13 DIAGNOSIS — N17.9 ACUTE KIDNEY FAILURE, UNSPECIFIED: ICD-10-CM

## 2023-04-13 DIAGNOSIS — I48.92 UNSPECIFIED ATRIAL FLUTTER: ICD-10-CM

## 2023-04-13 DIAGNOSIS — Z79.01 LONG TERM (CURRENT) USE OF ANTICOAGULANTS: ICD-10-CM

## 2023-04-13 DIAGNOSIS — I25.10 ATHEROSCLEROTIC HEART DISEASE OF NATIVE CORONARY ARTERY WITHOUT ANGINA PECTORIS: ICD-10-CM

## 2023-04-13 DIAGNOSIS — Z20.822 CONTACT WITH AND (SUSPECTED) EXPOSURE TO COVID-19: ICD-10-CM

## 2023-04-13 DIAGNOSIS — I50.22 CHRONIC SYSTOLIC (CONGESTIVE) HEART FAILURE: ICD-10-CM

## 2023-04-13 DIAGNOSIS — K26.4 CHRONIC OR UNSPECIFIED DUODENAL ULCER WITH HEMORRHAGE: ICD-10-CM

## 2023-04-13 DIAGNOSIS — Z95.5 PRESENCE OF CORONARY ANGIOPLASTY IMPLANT AND GRAFT: ICD-10-CM

## 2023-04-13 DIAGNOSIS — Z79.890 HORMONE REPLACEMENT THERAPY: ICD-10-CM

## 2023-04-13 DIAGNOSIS — E87.20 ACIDOSIS, UNSPECIFIED: ICD-10-CM

## 2023-04-13 DIAGNOSIS — D68.9 COAGULATION DEFECT, UNSPECIFIED: ICD-10-CM

## 2023-04-13 DIAGNOSIS — I25.2 OLD MYOCARDIAL INFARCTION: ICD-10-CM

## 2023-04-13 DIAGNOSIS — E43 UNSPECIFIED SEVERE PROTEIN-CALORIE MALNUTRITION: ICD-10-CM

## 2023-04-13 DIAGNOSIS — Z86.16 PERSONAL HISTORY OF COVID-19: ICD-10-CM

## 2023-04-13 DIAGNOSIS — N18.9 CHRONIC KIDNEY DISEASE, UNSPECIFIED: ICD-10-CM

## 2023-04-13 DIAGNOSIS — I27.20 PULMONARY HYPERTENSION, UNSPECIFIED: ICD-10-CM

## 2023-04-13 DIAGNOSIS — I48.20 CHRONIC ATRIAL FIBRILLATION, UNSPECIFIED: ICD-10-CM

## 2023-04-13 DIAGNOSIS — N39.0 URINARY TRACT INFECTION, SITE NOT SPECIFIED: ICD-10-CM

## 2023-04-13 DIAGNOSIS — Z66 DO NOT RESUSCITATE: ICD-10-CM

## 2023-04-13 DIAGNOSIS — C64.9 MALIGNANT NEOPLASM OF UNSPECIFIED KIDNEY, EXCEPT RENAL PELVIS: ICD-10-CM

## 2023-04-13 DIAGNOSIS — I95.9 HYPOTENSION, UNSPECIFIED: ICD-10-CM

## 2023-04-13 DIAGNOSIS — Z79.82 LONG TERM (CURRENT) USE OF ASPIRIN: ICD-10-CM

## 2023-04-13 DIAGNOSIS — R41.0 DISORIENTATION, UNSPECIFIED: ICD-10-CM

## 2023-04-13 DIAGNOSIS — R57.1 HYPOVOLEMIC SHOCK: ICD-10-CM

## 2023-04-13 DIAGNOSIS — K44.9 DIAPHRAGMATIC HERNIA WITHOUT OBSTRUCTION OR GANGRENE: ICD-10-CM

## 2023-04-13 DIAGNOSIS — I08.3 COMBINED RHEUMATIC DISORDERS OF MITRAL, AORTIC AND TRICUSPID VALVES: ICD-10-CM

## 2023-04-13 RX ORDER — VALSARTAN AND HYDROCHLOROTHIAZIDE 320; 25 MG/1; MG/1
320-25 TABLET, FILM COATED ORAL
Qty: 90 | Refills: 1 | Status: ACTIVE | COMMUNITY
Start: 2019-10-31 | End: 1900-01-01

## 2023-05-25 NOTE — BRIEF OPERATIVE NOTE - NSICDXBRIEFOPLAUNCH_GEN_ALL_CORE
<--- Click to Launch ICDx for PreOp, PostOp and Procedure

## 2024-11-22 NOTE — DIETITIAN INITIAL EVALUATION ADULT - NUTRITIONGOAL OUTCOME1
Pt will be able to meet nutrition/hydration GOC Patient is a 78 year old male, current smoker (1.5 PPD), w/ hx of COPD, HTN, DM, HLD, BPH s/p TURP, CAD s/p stent x1 placement about 8 years ago, skin CA, GERD and spinal stenosis , now with  lung nodule. Pt has CT scan every year as he is a smoker and this year it showed a RUL nodule Pt presents for preop eval to have CT guided fine needle aspiration of RUL nodule on 12/3/24.?  Patient denies recent fever, chills, shortness of breath, chest pain, nausea, vomiting or diarrhea.  ?

## 2025-02-01 NOTE — INPATIENT CERTIFICATION FOR MEDICARE PATIENTS - NS ICMP TWO DAYS INPATIENT
Hpi Title: Evaluation of Skin Lesions Yes Reviewed  Labs: ordered.  Radiology: ordered.  ECG/medicine tests: ordered and independent interpretation performed.     Details: Normal sinus rhythm, normal EKG    Risk  Prescription drug management.                FINAL IMPRESSION     1. Palpitations    2. Throat tightness    3. Shortness of breath          DISPOSITION/PLAN   Rodolfo Levine's  results have been reviewed with him.  He has been counseled regarding his diagnosis, treatment, and plan.  He verbally conveys understanding and agreement of the signs, symptoms, diagnosis, treatment and prognosis and additionally agrees to follow up as discussed.  He also agrees with the care-plan and conveys that all of his questions have been answered.  I have also provided discharge instructions for him that include: educational information regarding their diagnosis and treatment, and list of reasons why they would want to return to the ED prior to their follow-up appointment, should his condition change.     CLINICAL IMPRESSION    Discharge Note: The patient is stable for discharge home. The signs, symptoms, diagnosis, and discharge instructions have been discussed, understanding conveyed, and agreed upon. The patient is to follow up as recommended or return to ER should their symptoms worsen.      PATIENT REFERRED TO:  Lynn Navarrete APRN - NP  [address] [place] [address] [phone]      As needed    [facility] Emergency Department  [address] [address]  [phone]    If symptoms worsen       DISCHARGE MEDICATIONS:     Medication List        START taking these medications      famotidine 20 MG tablet  Commonly known as: Pepcid  Take 1 tablet by mouth 2 times daily     hydrOXYzine HCl 25 MG tablet  Commonly known as: ATARAX  Take 1 tablet by mouth every 8 hours as needed for Anxiety     lidocaine viscous hcl 2 % Soln solution  Commonly known as: XYLOCAINE  Take 10 mLs by mouth 3 times daily as needed for